# Patient Record
Sex: FEMALE | Race: WHITE | NOT HISPANIC OR LATINO | Employment: FULL TIME | RURAL
[De-identification: names, ages, dates, MRNs, and addresses within clinical notes are randomized per-mention and may not be internally consistent; named-entity substitution may affect disease eponyms.]

---

## 2017-01-26 ENCOUNTER — OFFICE VISIT (OUTPATIENT)
Dept: RETAIL CLINIC | Facility: CLINIC | Age: 67
End: 2017-01-26

## 2017-01-26 DIAGNOSIS — Z23 NEED FOR IMMUNIZATION AGAINST INFLUENZA: Primary | ICD-10-CM

## 2017-02-10 ENCOUNTER — TRANSCRIBE ORDERS (OUTPATIENT)
Dept: ADMINISTRATIVE | Facility: HOSPITAL | Age: 67
End: 2017-02-10

## 2017-02-10 DIAGNOSIS — N63.0 LUMP OR MASS IN BREAST: Primary | ICD-10-CM

## 2017-02-21 ENCOUNTER — APPOINTMENT (OUTPATIENT)
Dept: MAMMOGRAPHY | Facility: HOSPITAL | Age: 67
End: 2017-02-21

## 2017-02-24 ENCOUNTER — APPOINTMENT (OUTPATIENT)
Dept: MAMMOGRAPHY | Facility: HOSPITAL | Age: 67
End: 2017-02-24

## 2017-03-23 ENCOUNTER — APPOINTMENT (OUTPATIENT)
Dept: OTHER | Facility: HOSPITAL | Age: 67
End: 2017-03-23

## 2017-03-23 ENCOUNTER — APPOINTMENT (OUTPATIENT)
Dept: MAMMOGRAPHY | Facility: HOSPITAL | Age: 67
End: 2017-03-23

## 2017-03-23 ENCOUNTER — HOSPITAL ENCOUNTER (OUTPATIENT)
Dept: MAMMOGRAPHY | Facility: HOSPITAL | Age: 67
Discharge: HOME | End: 2017-03-23
Admitting: NURSE PRACTITIONER

## 2017-03-23 DIAGNOSIS — Z92.89 H/O MAMMOGRAM: ICD-10-CM

## 2017-03-23 DIAGNOSIS — N63.0 LUMP OR MASS IN BREAST: ICD-10-CM

## 2017-03-23 PROCEDURE — G0279 TOMOSYNTHESIS, MAMMO: HCPCS

## 2017-03-23 PROCEDURE — G0204 DX MAMMO INCL CAD BI: HCPCS | Performed by: RADIOLOGY

## 2017-03-23 PROCEDURE — G0204 DX MAMMO INCL CAD BI: HCPCS

## 2017-03-23 PROCEDURE — G0279 TOMOSYNTHESIS, MAMMO: HCPCS | Performed by: RADIOLOGY

## 2017-06-05 ENCOUNTER — TRANSCRIBE ORDERS (OUTPATIENT)
Dept: ADMINISTRATIVE | Facility: HOSPITAL | Age: 67
End: 2017-06-05

## 2017-06-05 DIAGNOSIS — R92.8 ABNORMAL MAMMOGRAM: Primary | ICD-10-CM

## 2017-06-15 ENCOUNTER — APPOINTMENT (OUTPATIENT)
Dept: PHYSICAL THERAPY | Facility: HOSPITAL | Age: 67
End: 2017-06-15

## 2017-06-26 ENCOUNTER — APPOINTMENT (OUTPATIENT)
Dept: MAMMOGRAPHY | Facility: HOSPITAL | Age: 67
End: 2017-06-26

## 2017-07-11 ENCOUNTER — OFFICE VISIT (OUTPATIENT)
Dept: RETAIL CLINIC | Facility: CLINIC | Age: 67
End: 2017-07-11

## 2017-07-11 VITALS
RESPIRATION RATE: 15 BRPM | HEART RATE: 75 BPM | TEMPERATURE: 97.5 F | WEIGHT: 243.6 LBS | HEIGHT: 69 IN | OXYGEN SATURATION: 96 % | BODY MASS INDEX: 36.08 KG/M2

## 2017-07-11 DIAGNOSIS — J06.9 UPPER RESPIRATORY TRACT INFECTION, UNSPECIFIED TYPE: Primary | ICD-10-CM

## 2017-07-11 PROCEDURE — 99213 OFFICE O/P EST LOW 20 MIN: CPT | Performed by: NURSE PRACTITIONER

## 2017-07-11 RX ORDER — LORATADINE 10 MG/1
10 TABLET ORAL DAILY
Qty: 30 TABLET | Refills: 0 | Status: SHIPPED | OUTPATIENT
Start: 2017-07-11 | End: 2017-08-10

## 2017-07-11 RX ORDER — PREDNISONE 10 MG/1
TABLET ORAL DAILY
Qty: 21 EACH | Refills: 0 | Status: SHIPPED | OUTPATIENT
Start: 2017-07-11 | End: 2017-07-17

## 2017-07-11 RX ORDER — SULFAMETHOXAZOLE AND TRIMETHOPRIM 800; 160 MG/1; MG/1
1 TABLET ORAL 2 TIMES DAILY
Qty: 20 TABLET | Refills: 0 | Status: SHIPPED | OUTPATIENT
Start: 2017-07-11 | End: 2017-07-21

## 2017-07-11 RX ORDER — ESTRADIOL 10 UG/1
1 INSERT VAGINAL 2 TIMES WEEKLY
COMMUNITY
End: 2021-01-05 | Stop reason: SDUPTHER

## 2017-07-11 NOTE — PROGRESS NOTES
Subjective   Keisha Malagon is a 66 y.o. female presents with c/o bilateral ear pain, intermittent headache, sinus pressure/congestion, and cough that started 3 weeks ago. Went to [facility] and was treated for ear infection and sinus infection with Augmentin x10 days; states she felt better until 3 days ago when symptoms recurred.  Dose of Tylenol earlier this morning.    Sinus Problem   This is a new problem. The current episode started 1 to 4 weeks ago. The problem has been gradually worsening since onset. The maximum temperature recorded prior to her arrival was 100.4 - 100.9 F. The fever has been present for 1 to 2 days. Her pain is at a severity of 4/10. The pain is mild. Associated symptoms include chills, congestion, coughing, ear pain, headaches (intermittent), sinus pressure and a sore throat. Pertinent negatives include no shortness of breath or sneezing.        The following portions of the patient's history were reviewed and updated as appropriate: allergies, current medications, past family history, past medical history, past social history, past surgical history and problem list.    Review of Systems   Constitutional: Positive for appetite change (decreased), chills, fatigue and fever.   HENT: Positive for congestion, ear pain, postnasal drip, sinus pressure and sore throat. Negative for rhinorrhea, sneezing and trouble swallowing.    Eyes: Negative for redness.   Respiratory: Positive for cough. Negative for chest tightness, shortness of breath and wheezing.    Cardiovascular: Negative for palpitations.   Gastrointestinal: Negative for abdominal pain, diarrhea, nausea and vomiting.   Musculoskeletal: Negative for myalgias.   Skin: Negative for rash.   Neurological: Positive for headaches (intermittent). Negative for dizziness.       Objective   Physical Exam   Constitutional: She is oriented to person, place, and time. She appears well-developed and well-nourished.   HENT:   Head: Normocephalic and atraumatic.    Right Ear: Hearing, external ear and ear canal normal. Tympanic membrane is not erythematous. A middle ear effusion (mild) is present.   Left Ear: Hearing, external ear and ear canal normal. Tympanic membrane is not erythematous. A middle ear effusion (mild) is present.   Nose: Mucosal edema (moderate) present. No rhinorrhea. Right sinus exhibits no maxillary sinus tenderness and no frontal sinus tenderness. Left sinus exhibits no maxillary sinus tenderness and no frontal sinus tenderness.   Mouth/Throat: Mucous membranes are dry. No uvula swelling. Posterior oropharyngeal erythema (mild) present.   Tonsils absent     Eyes: Conjunctivae are normal. Pupils are equal, round, and reactive to light.   Neck: Normal range of motion.   Cardiovascular: Normal rate, regular rhythm and normal heart sounds.    No murmur heard.  Pulmonary/Chest: Effort normal and breath sounds normal. No respiratory distress. She has no wheezes.   Lymphadenopathy:     She has cervical adenopathy (shotty).   Neurological: She is alert and oriented to person, place, and time.   Skin: Skin is warm and dry. No rash noted.   Psychiatric: She has a normal mood and affect. Her behavior is normal. Judgment and thought content normal.   Vitals reviewed.      Assessment/Plan   Keisha was seen today for sinusitis and earache.    Diagnoses and all orders for this visit:    Upper respiratory tract infection, unspecified type  -     loratadine (CLARITIN) 10 MG tablet; Take 1 tablet by mouth Daily for 30 days.  -     sulfamethoxazole-trimethoprim (BACTRIM DS) 800-160 MG per tablet; Take 1 tablet by mouth 2 (Two) Times a Day for 10 days.  -     PredniSONE (DELTASONE) 10 MG (21) tablet pack; Take  by mouth Daily for 6 days.    Advised patient that it is not likely that she has had 2 sinus infections in less than 3 weeks; symptoms are likely viral in nature. Patient pleasant but persistent; antibiotic has been prescribed for patient to  is symptoms do  not improve-patient verbalizes understanding. Instructed patient regarding diagnosis, proper medication administration including side effects, adequate fluid intake and techniques to prevent the spread of illness. If symptoms persist or worsen follow up with PCP for further evaluation. Patient verbalizes understanding.-CIARA Soliman

## 2017-07-11 NOTE — PATIENT INSTRUCTIONS
"Upper Respiratory Infection, Adult  Most upper respiratory infections (URIs) are caused by a virus. A URI affects the nose, throat, and upper air passages. The most common type of URI is often called \"the common cold.\"  HOME CARE   · Take medicines only as told by your doctor.  · Gargle warm saltwater or take cough drops to comfort your throat as told by your doctor.  · Use a warm mist humidifier or inhale steam from a shower to increase air moisture. This may make it easier to breathe.  · Drink enough fluid to keep your pee (urine) clear or pale yellow.  · Eat soups and other clear broths.  · Have a healthy diet.  · Rest as needed.  · Go back to work when your fever is gone or your doctor says it is okay.  ¨ You may need to stay home longer to avoid giving your URI to others.  ¨ You can also wear a face mask and wash your hands often to prevent spread of the virus.  · Use your inhaler more if you have asthma.  · Do not use any tobacco products, including cigarettes, chewing tobacco, or electronic cigarettes. If you need help quitting, ask your doctor.  GET HELP IF:  · You are getting worse, not better.  · Your symptoms are not helped by medicine.  · You have chills.  · You are getting more short of breath.  · You have brown or red mucus.  · You have yellow or brown discharge from your nose.  · You have pain in your face, especially when you bend forward.  · You have a fever.  · You have puffy (swollen) neck glands.  · You have pain while swallowing.  · You have white areas in the back of your throat.  GET HELP RIGHT AWAY IF:   · You have very bad or constant:    Headache.    Ear pain.    Pain in your forehead, behind your eyes, and over your cheekbones (sinus pain).    Chest pain.  · You have long-lasting (chronic) lung disease and any of the following:    Wheezing.    Long-lasting cough.    Coughing up blood.    A change in your usual mucus.  · You have a stiff neck.  · You have changes in your:    Vision.   "  Hearing.    Thinking.    Mood.  MAKE SURE YOU:   · Understand these instructions.  · Will watch your condition.  · Will get help right away if you are not doing well or get worse.     This information is not intended to replace advice given to you by your health care provider. Make sure you discuss any questions you have with your health care provider.     Document Released: 06/05/2009 Document Revised: 05/03/2016 Document Reviewed: 03/25/2015  [employer] Interactive Patient Education ©2017 [employer]

## 2017-07-13 ENCOUNTER — HOSPITAL ENCOUNTER (OUTPATIENT)
Dept: MAMMOGRAPHY | Facility: HOSPITAL | Age: 67
Discharge: HOME | End: 2017-07-13
Admitting: NURSE PRACTITIONER

## 2017-07-13 DIAGNOSIS — R92.8 ABNORMAL MAMMOGRAM: ICD-10-CM

## 2017-07-13 PROCEDURE — G0206 DX MAMMO INCL CAD UNI: HCPCS

## 2017-07-13 PROCEDURE — G0206 DX MAMMO INCL CAD UNI: HCPCS | Performed by: RADIOLOGY

## 2018-02-06 ENCOUNTER — TRANSCRIBE ORDERS (OUTPATIENT)
Dept: NUTRITION | Facility: HOSPITAL | Age: 68
End: 2018-02-06

## 2018-02-06 DIAGNOSIS — E78.00 HYPERCHOLESTEREMIA: Primary | ICD-10-CM

## 2018-02-06 DIAGNOSIS — E78.1 HYPERTRIGLYCERIDEMIA: ICD-10-CM

## 2018-02-16 ENCOUNTER — APPOINTMENT (OUTPATIENT)
Dept: NUTRITION | Facility: HOSPITAL | Age: 68
End: 2018-02-16
Attending: INTERNAL MEDICINE

## 2018-02-23 ENCOUNTER — HOSPITAL ENCOUNTER (OUTPATIENT)
Dept: NUTRITION | Facility: HOSPITAL | Age: 68
Setting detail: RECURRING SERIES
Discharge: HOME | End: 2018-02-23
Attending: INTERNAL MEDICINE

## 2018-02-23 VITALS — WEIGHT: 248 LBS | HEIGHT: 69 IN | BODY MASS INDEX: 36.73 KG/M2

## 2018-02-23 PROCEDURE — 97802 MEDICAL NUTRITION INDIV IN: CPT | Performed by: DIETITIAN, REGISTERED

## 2018-03-23 ENCOUNTER — APPOINTMENT (OUTPATIENT)
Dept: NUTRITION | Facility: HOSPITAL | Age: 68
End: 2018-03-23
Attending: INTERNAL MEDICINE

## 2018-05-14 ENCOUNTER — DOCUMENTATION (OUTPATIENT)
Dept: NUTRITION | Facility: HOSPITAL | Age: 68
End: 2018-05-14

## 2018-05-14 NOTE — PROGRESS NOTES
"Adult Outpatient Nutrition  Assessment    Patient Name:  Keisha Malagon  YOB: 1950  MRN: [id number]    Assessment Date:  5/14/2018    Comments:  Patient mailed nutrition follow up letter back to dietitian. Patient was see 2/23/18 for initial nutrition counseling visit. She cancelled follow up visits scheduled for 3/23/18 and 4/13/18. Per letter, patient did not have any problems following the suggestions made during appointment. She is not counting calories, \"just with portion control\". Patient stated no changes in weight. She has switched to soy milk and eating more plant-based protein. Patient reported \"still eating too much\". Her work schedule is busy, unable to come in. Patient declined phone call from dietitian, will reach out when she has questions.       Electronically signed by:  Kristan Lorenzana RD  05/14/18 10:32 AM  "

## 2018-05-30 DIAGNOSIS — Z00.00 ANNUAL PHYSICAL EXAM: Primary | ICD-10-CM

## 2018-05-30 DIAGNOSIS — E55.9 VITAMIN D DEFICIENCY: ICD-10-CM

## 2018-08-10 ENCOUNTER — OFFICE VISIT (OUTPATIENT)
Dept: FAMILY MEDICINE CLINIC | Facility: CLINIC | Age: 68
End: 2018-08-10

## 2018-08-10 VITALS
HEIGHT: 69 IN | SYSTOLIC BLOOD PRESSURE: 124 MMHG | OXYGEN SATURATION: 97 % | WEIGHT: 248 LBS | DIASTOLIC BLOOD PRESSURE: 84 MMHG | BODY MASS INDEX: 36.73 KG/M2 | HEART RATE: 75 BPM

## 2018-08-10 DIAGNOSIS — Z76.89 ENCOUNTER TO ESTABLISH CARE: Primary | ICD-10-CM

## 2018-08-10 DIAGNOSIS — R10.9 RIGHT SIDED ABDOMINAL PAIN: ICD-10-CM

## 2018-08-10 PROBLEM — M54.50 CHRONIC MIDLINE LOW BACK PAIN WITHOUT SCIATICA: Status: ACTIVE | Noted: 2018-08-10

## 2018-08-10 PROBLEM — G89.29 CHRONIC MIDLINE LOW BACK PAIN WITHOUT SCIATICA: Status: ACTIVE | Noted: 2018-08-10

## 2018-08-10 PROCEDURE — 99213 OFFICE O/P EST LOW 20 MIN: CPT | Performed by: PHYSICIAN ASSISTANT

## 2018-08-10 RX ORDER — AMITRIPTYLINE HYDROCHLORIDE 25 MG/1
25 TABLET, FILM COATED ORAL NIGHTLY
Qty: 30 TABLET | Refills: 2 | Status: SHIPPED | OUTPATIENT
Start: 2018-08-10 | End: 2020-02-03

## 2018-08-10 RX ORDER — OXYCODONE HYDROCHLORIDE AND ACETAMINOPHEN 5; 325 MG/1; MG/1
TABLET ORAL
COMMUNITY
Start: 2018-05-20 | End: 2018-08-10

## 2018-08-10 RX ORDER — OXYCODONE HYDROCHLORIDE AND ACETAMINOPHEN 5; 325 MG/1; MG/1
1 TABLET ORAL EVERY 6 HOURS PRN
COMMUNITY
End: 2021-01-08

## 2018-08-10 RX ORDER — FLUTICASONE PROPIONATE 50 MCG
SPRAY, SUSPENSION (ML) NASAL
COMMUNITY
Start: 2018-08-08 | End: 2020-02-21 | Stop reason: SDUPTHER

## 2018-08-10 RX ORDER — OMEPRAZOLE 20 MG/1
3 CAPSULE, DELAYED RELEASE ORAL DAILY
COMMUNITY
Start: 2018-07-24 | End: 2018-08-10

## 2018-08-10 RX ORDER — DEXLANSOPRAZOLE 60 MG/1
1 CAPSULE, DELAYED RELEASE ORAL DAILY
COMMUNITY
Start: 2018-08-06

## 2018-08-10 RX ORDER — BACLOFEN 10 MG/1
1 TABLET ORAL DAILY
COMMUNITY
Start: 2018-05-20 | End: 2018-10-22 | Stop reason: SDUPTHER

## 2018-08-10 NOTE — PROGRESS NOTES
Chief Complaint   Patient presents with   • Establish Care     stomach issues        HPI     Keisha Malagon is a 67 y.o. female who presents to establish care and for stomach issues.  Patient was previously seen by Dr. Hammer and myself at [facility].  Her past medical history is significant for low back pain and GERD.      Chief Complaint   Patient presents with   • Establish Care     stomach issues        Past Medical History:   Diagnosis Date   • Allergic    • Breast injury 11/2016    pt feel and injured left breast, bruise has cleared, but still feels a lump in that area   • GERD (gastroesophageal reflux disease)        Past Surgical History:   Procedure Laterality Date   • COLONOSCOPY     • HYSTERECTOMY     • OOPHORECTOMY     • TONSILLECTOMY         Family History   Problem Relation Age of Onset   • Ovarian cancer Maternal Grandmother 67   • No Known Problems Son    • No Known Problems Daughter    • Breast cancer Neg Hx        Social History     Social History   • Marital status: Single     Spouse name: N/A   • Number of children: N/A   • Years of education: N/A     Occupational History   • Not on file.     Social History Main Topics   • Smoking status: Never Smoker   • Smokeless tobacco: Never Used   • Alcohol use No   • Drug use: No   • Sexual activity: Defer     Other Topics Concern   • Not on file     Social History Narrative   • No narrative on file       Allergies   Allergen Reactions   • Bactrim [Sulfamethoxazole-Trimethoprim] Hives   • Levaquin [Levofloxacin In D5w] Other (See Comments)     Muscle weakness and soreness        ROS    Review of Systems   Constitutional: Negative for activity change, appetite change, chills, diaphoresis, fatigue, fever, unexpected weight gain and unexpected weight loss.   HENT: Negative for congestion, dental problem, ear pain, hearing loss, nosebleeds, sinus pressure, sore throat and trouble swallowing.    Eyes: Negative for blurred vision, pain, redness and visual disturbance.  "  Respiratory: Negative for apnea, cough, chest tightness, shortness of breath and wheezing.    Cardiovascular: Negative for chest pain, palpitations and leg swelling.   Gastrointestinal: Positive for abdominal pain. Negative for abdominal distention, anal bleeding, blood in stool, constipation, diarrhea, nausea, vomiting, GERD and indigestion.   Endocrine: Negative for cold intolerance, heat intolerance, polydipsia, polyphagia and polyuria.   Genitourinary: Negative for urinary incontinence, decreased urine volume, difficulty urinating, dysuria, frequency, hematuria and urgency.   Musculoskeletal: Negative for gait problem, joint swelling and bursitis.   Skin: Negative for dry skin, rash, skin lesions and bruise.   Neurological: Negative for dizziness, tremors, seizures, syncope, speech difficulty, weakness, light-headedness, headache, memory problem and confusion.   Hematological: Does not bruise/bleed easily.   Psychiatric/Behavioral: Negative for agitation, behavioral problems, decreased concentration, hallucinations, sleep disturbance, suicidal ideas, depressed mood and stress. The patient is not nervous/anxious.        Vitals:    08/10/18 1022   BP: 124/84   BP Location: Right arm   Patient Position: Sitting   Cuff Size: Adult   Pulse: 75   SpO2: 97%   Weight: 112 kg (248 lb)   Height: 175.3 cm (69.02\")         Current Outpatient Prescriptions:   •  baclofen (LIORESAL) 10 MG tablet, Take 1 tablet by mouth Daily., Disp: , Rfl:   •  DEXILANT 60 MG capsule, Take 1 capsule by mouth Daily., Disp: , Rfl:   •  estradiol (VAGIFEM) 10 MCG tablet vaginal tablet, Insert 1 tablet into the vagina 2 (Two) Times a Week., Disp: , Rfl:   •  fluticasone (FLONASE) 50 MCG/ACT nasal spray, , Disp: , Rfl:   •  oxyCODONE-acetaminophen (PERCOCET) 5-325 MG per tablet, Take 1 tablet by mouth Every 6 (Six) Hours As Needed., Disp: , Rfl:   •  amitriptyline (ELAVIL) 25 MG tablet, Take 1 tablet by mouth Every Night., Disp: 30 tablet, Rfl: " 2  •  Fluticasone Propionate (FLONASE NA), into each nostril., Disp: , Rfl:     PE    Physical Exam   Constitutional: She is oriented to person, place, and time. She appears well-developed and well-nourished. No distress.   HENT:   Head: Normocephalic.   Eyes: Conjunctivae are normal.   Neck: Normal range of motion.   Abdominal: Soft. Normal appearance. There is no tenderness. There is no rigidity, no rebound and no guarding. No hernia.   TTP along right lower aspect of abdomen only when patient is flexing stomach muscles.  No TTP with relaxed abdominal wall.   Musculoskeletal: Normal range of motion.   Neurological: She is alert and oriented to person, place, and time.   Skin: Skin is warm. No rash noted. She is not diaphoretic. No erythema.   Psychiatric: She has a normal mood and affect. Her speech is normal and behavior is normal. Judgment and thought content normal. She is not actively hallucinating. Cognition and memory are normal. She is attentive.   Vitals reviewed.      A/P    Keisha was seen today for establish care.    Diagnoses and all orders for this visit:    Encounter to establish care    Right sided abdominal pain  -pain is present with certain movements and when her abdominal wall is flexed  -normal appearance, no pain with deep palpation  -history and exam indicate musculoskeletal pain  -will trial elavil to help with abdominal pain and sleep, wakes up with pain occasionally  -recent colonoscopy, endoscopy, CT abdo/pelvis all unremarkable  -recommend avoiding heavy lifting, and working on core strengthening exercises like yoga, overall weight loss  -     amitriptyline (ELAVIL) 25 MG tablet; Take 1 tablet by mouth Every Night.         Plan of care reviewed with patient at the conclusion of today's visit. Education was provided regarding diagnosis, management and any prescribed or recommended OTC medications.  Patient verbalizes understanding of and agreement with management plan.    No Follow-up on  file.     Karena Garcia PA-C

## 2018-08-17 ENCOUNTER — PRIOR AUTHORIZATION (OUTPATIENT)
Dept: FAMILY MEDICINE CLINIC | Facility: CLINIC | Age: 68
End: 2018-08-17

## 2018-08-22 ENCOUNTER — TELEPHONE (OUTPATIENT)
Dept: FAMILY MEDICINE CLINIC | Facility: CLINIC | Age: 68
End: 2018-08-22

## 2018-08-22 NOTE — TELEPHONE ENCOUNTER
LVM for pt to call back. Pt insurance denied Amitriptyline but pt can get it at [facility] with a [employer] coupon for $10.

## 2018-09-14 ENCOUNTER — OFFICE VISIT (OUTPATIENT)
Dept: FAMILY MEDICINE CLINIC | Facility: CLINIC | Age: 68
End: 2018-09-14

## 2018-09-14 VITALS
SYSTOLIC BLOOD PRESSURE: 106 MMHG | HEIGHT: 69 IN | OXYGEN SATURATION: 96 % | HEART RATE: 79 BPM | BODY MASS INDEX: 35.99 KG/M2 | TEMPERATURE: 97.5 F | RESPIRATION RATE: 16 BRPM | DIASTOLIC BLOOD PRESSURE: 78 MMHG | WEIGHT: 243 LBS

## 2018-09-14 DIAGNOSIS — Z23 NEED FOR IMMUNIZATION AGAINST INFLUENZA: ICD-10-CM

## 2018-09-14 DIAGNOSIS — K21.9 GASTROESOPHAGEAL REFLUX DISEASE WITHOUT ESOPHAGITIS: ICD-10-CM

## 2018-09-14 DIAGNOSIS — E78.5 HYPERLIPIDEMIA, UNSPECIFIED HYPERLIPIDEMIA TYPE: ICD-10-CM

## 2018-09-14 DIAGNOSIS — R10.11 RUQ PAIN: Primary | ICD-10-CM

## 2018-09-14 DIAGNOSIS — B37.0 THRUSH: ICD-10-CM

## 2018-09-14 PROCEDURE — G0008 ADMIN INFLUENZA VIRUS VAC: HCPCS | Performed by: PHYSICIAN ASSISTANT

## 2018-09-14 PROCEDURE — 99214 OFFICE O/P EST MOD 30 MIN: CPT | Performed by: PHYSICIAN ASSISTANT

## 2018-09-14 PROCEDURE — 90662 IIV NO PRSV INCREASED AG IM: CPT | Performed by: PHYSICIAN ASSISTANT

## 2018-09-14 RX ORDER — ICOSAPENT ETHYL 1000 MG/1
2 CAPSULE ORAL 2 TIMES DAILY WITH MEALS
Qty: 360 CAPSULE | Refills: 3 | Status: SHIPPED | OUTPATIENT
Start: 2018-09-14 | End: 2020-04-01 | Stop reason: SDUPTHER

## 2018-09-14 RX ORDER — DIPHENHYDRAMINE, LIDOCAINE, NYSTATIN
5 KIT ORAL 3 TIMES DAILY
Qty: 60 ML | Refills: 0 | Status: SHIPPED | OUTPATIENT
Start: 2018-09-14 | End: 2018-09-14 | Stop reason: SDUPTHER

## 2018-09-14 RX ORDER — DIPHENHYDRAMINE, LIDOCAINE, NYSTATIN
5 KIT ORAL 3 TIMES DAILY
Qty: 60 ML | Refills: 0 | Status: SHIPPED | OUTPATIENT
Start: 2018-09-14 | End: 2020-02-03

## 2018-09-14 NOTE — PATIENT INSTRUCTIONS
-wild caught salmon  -limit tuna to 2 servings a week due to mercury  -beans and nuts    -Need prevnar 23 (office is out currently)    -the radiologist report of CT abdo/pelvis (and any other scans) obtain copy from GI doctor and bring to our office

## 2018-09-14 NOTE — PROGRESS NOTES
Chief Complaint   Patient presents with   • Abdominal Pain   • Results   • Immunizations     Hep A and High dose Flu        HPI     Keisha Malagon is a pleasant 67 y.o. female who is here for routine follow-up from previous visit on 08/10/18 for RUQ pain.  She has since been to Dr. King and is pending imaging for possible gall bladder disease.  She is watching her diet carefully and taking dexilant.  She has not started amitriptyline and wants to wait.  She reports burning along her tongue and throat with history of thrush.  She would like flu shot and prevnar today.    Past Medical History:   Diagnosis Date   • Allergic    • Breast injury 11/2016    pt feel and injured left breast, bruise has cleared, but still feels a lump in that area   • DDD (degenerative disc disease), lumbar    • GERD (gastroesophageal reflux disease)    • Irritable bowel syndrome    • Kidney stone        Past Surgical History:   Procedure Laterality Date   • COLONOSCOPY     • HYSTERECTOMY     • OOPHORECTOMY     • TONSILLECTOMY         Family History   Problem Relation Age of Onset   • Ovarian cancer Maternal Grandmother 67   • No Known Problems Son    • No Known Problems Daughter    • Breast cancer Neg Hx        Social History     Social History   • Marital status: Single     Spouse name: N/A   • Number of children: N/A   • Years of education: N/A     Occupational History   • Not on file.     Social History Main Topics   • Smoking status: Never Smoker   • Smokeless tobacco: Never Used   • Alcohol use No   • Drug use: No   • Sexual activity: Defer     Other Topics Concern   • Not on file     Social History Narrative   • No narrative on file       Allergies   Allergen Reactions   • Bactrim [Sulfamethoxazole-Trimethoprim] Hives   • Levaquin [Levofloxacin In D5w] Other (See Comments)     Muscle weakness and soreness        ROS    Review of Systems   Constitutional: Negative for chills, fatigue and fever.   HENT: Positive for sore throat.   "  Gastrointestinal: Positive for abdominal pain and indigestion. Negative for blood in stool, constipation, diarrhea, nausea and vomiting.   Musculoskeletal: Positive for back pain.   Psychiatric/Behavioral: Positive for stress. Negative for depressed mood. The patient is nervous/anxious.        Vitals:    09/14/18 1604   BP: 106/78   Pulse: 79   Resp: 16   Temp: 97.5 °F (36.4 °C)   TempSrc: Temporal Artery    SpO2: 96%   Weight: 110 kg (243 lb)   Height: 175.3 cm (69\")         Current Outpatient Prescriptions:   •  baclofen (LIORESAL) 10 MG tablet, Take 1 tablet by mouth Daily., Disp: , Rfl:   •  DEXILANT 60 MG capsule, Take 1 capsule by mouth Daily., Disp: , Rfl:   •  estradiol (VAGIFEM) 10 MCG tablet vaginal tablet, Insert 1 tablet into the vagina 2 (Two) Times a Week., Disp: , Rfl:   •  fluticasone (FLONASE) 50 MCG/ACT nasal spray, , Disp: , Rfl:   •  Fluticasone Propionate (FLONASE NA), into each nostril., Disp: , Rfl:   •  oxyCODONE-acetaminophen (PERCOCET) 5-325 MG per tablet, Take 1 tablet by mouth Every 6 (Six) Hours As Needed., Disp: , Rfl:   •  Probiotic Product (ALIGN EXTRA STRENGTH PO), Take  by mouth., Disp: , Rfl:   •  amitriptyline (ELAVIL) 25 MG tablet, Take 1 tablet by mouth Every Night., Disp: 30 tablet, Rfl: 2  •  icosapent ethyl (VASCEPA) 1 g capsule capsule, Take 2 g by mouth 2 (Two) Times a Day With Meals., Disp: 360 capsule, Rfl: 3  •  nystatin susp + lidocaine viscous (MAGIC MOUTHWASH) oral suspension, Swish and swallow 5 mL 3 (Three) Times a Day., Disp: 60 mL, Rfl: 0  No current facility-administered medications for this visit.     PE    Physical Exam   Constitutional: She is oriented to person, place, and time. She appears well-developed and well-nourished. No distress.   HENT:   Head: Normocephalic and atraumatic.   Right Ear: Hearing, tympanic membrane, external ear and ear canal normal.   Left Ear: Hearing, tympanic membrane, external ear and ear canal normal.   Mouth/Throat: Uvula is " midline and oropharynx is clear and moist.   Eyes: Conjunctivae are normal.   Neck: Normal range of motion.   Cardiovascular: Normal rate, regular rhythm and normal heart sounds.    Pulmonary/Chest: Effort normal and breath sounds normal.   Musculoskeletal: Normal range of motion. She exhibits no edema.   Neurological: She is alert and oriented to person, place, and time.   Skin: Skin is warm. No rash noted. She is not diaphoretic. No erythema.   Psychiatric: She has a normal mood and affect. Her behavior is normal. Judgment and thought content normal.   Vitals reviewed.      A/P    Keisha was seen today for abdominal pain, results and immunizations.    Diagnoses and all orders for this visit:    RUQ pain  -following with Dr. King  -pending imaging for possible gall bladder disease    Hyperlipidemia, unspecified hyperlipidemia type  -tolerating vascepa without any myalgias    Gastroesophageal reflux disease without esophagitis  -on dexilant    Need for immunization against influenza  -     Flu Vaccine High Dose PF 65YR+ ([id number])    Thrush  -treat with magic mouthwash       Plan of care reviewed with patient at the conclusion of today's visit. Education was provided regarding diagnosis, management and any prescribed or recommended OTC medications.  Patient verbalizes understanding of and agreement with management plan.    No Follow-up on file.     Karena Garcia PA-C

## 2018-09-19 ENCOUNTER — TELEPHONE (OUTPATIENT)
Dept: FAMILY MEDICINE CLINIC | Facility: CLINIC | Age: 68
End: 2018-09-19

## 2018-09-20 ENCOUNTER — TELEPHONE (OUTPATIENT)
Dept: FAMILY MEDICINE CLINIC | Facility: CLINIC | Age: 68
End: 2018-09-20

## 2018-09-20 NOTE — TELEPHONE ENCOUNTER
Spoke with pt about CT scan and she stated that she had talked to GI on Tuesday and they were supposed to have faxed the records here. I explained to the patient that we didn't have them in her chart and she stated that she would give them a call and get them to send them over.

## 2018-09-20 NOTE — TELEPHONE ENCOUNTER
I had spoke with the patient earlier about the medication and she stated that she didn't need the medication anymore. Stated that she had found a bottle from a different time that had been prescribed, also stated that symptoms were gone.     I called [facility] pharmacy and told them that the patient didn't want the medication and to cancel it on their end.

## 2018-09-20 NOTE — TELEPHONE ENCOUNTER
I do not have records from her GI doctor and I don't have the ability to look at the CT.  If patient brings in a copy of these when she gets the pneumonia vaccination I can look at them and call her or we can look at them together at an ov that day.

## 2018-09-20 NOTE — TELEPHONE ENCOUNTER
Compounded mouthwash scripts [facility] to [facility].     LM at #[phone] for pt to call back. Is she okay with the script going to one of these pharmacies?

## 2018-09-20 NOTE — TELEPHONE ENCOUNTER
Called pt about prescription and she stated that she wasn't worried about it. States that she found a bottle that she had from before that was unopened, also stated that her symptoms were gone. Pt also stated that she would like to come in for the pneumo 23 vaccine, told her that we had some in stock now. Pt also stated that she would like to talk to Karena about a CT scan that she had done at her gastroenterologists office, states that they told her the results but she didn't know if she needed to follow up from that so she would like a call to discuss this.

## 2018-09-20 NOTE — TELEPHONE ENCOUNTER
[facility] CALLED AND SAID THAT THEY CAN'T COMPOUND NYSTATIN + LIDOCAINE AND WANTS TO KNOW IF PCP WANTS THEM TO SPLIT THEM UP OR USE SOMETHING ELSE. PLEASE CALL BACK

## 2018-09-21 ENCOUNTER — OFFICE VISIT (OUTPATIENT)
Dept: FAMILY MEDICINE CLINIC | Facility: CLINIC | Age: 68
End: 2018-09-21

## 2018-09-21 ENCOUNTER — LAB (OUTPATIENT)
Dept: LAB | Facility: HOSPITAL | Age: 68
End: 2018-09-21

## 2018-09-21 VITALS
TEMPERATURE: 97.5 F | HEIGHT: 69 IN | WEIGHT: 245 LBS | OXYGEN SATURATION: 96 % | HEART RATE: 85 BPM | BODY MASS INDEX: 36.29 KG/M2 | DIASTOLIC BLOOD PRESSURE: 82 MMHG | SYSTOLIC BLOOD PRESSURE: 118 MMHG

## 2018-09-21 DIAGNOSIS — K29.70 GASTRITIS, PRESENCE OF BLEEDING UNSPECIFIED, UNSPECIFIED CHRONICITY, UNSPECIFIED GASTRITIS TYPE: ICD-10-CM

## 2018-09-21 DIAGNOSIS — Z71.85 VACCINE COUNSELING: ICD-10-CM

## 2018-09-21 DIAGNOSIS — R91.1 PULMONARY NODULE, RIGHT: ICD-10-CM

## 2018-09-21 DIAGNOSIS — K20.90 ESOPHAGITIS: ICD-10-CM

## 2018-09-21 DIAGNOSIS — R91.1 PULMONARY NODULE, RIGHT: Primary | ICD-10-CM

## 2018-09-21 LAB
CREAT BLD-MCNC: 0.91 MG/DL (ref 0.6–1.3)
GFR SERPL CREATININE-BSD FRML MDRD: 61 ML/MIN/1.73

## 2018-09-21 PROCEDURE — 82565 ASSAY OF CREATININE: CPT | Performed by: PHYSICIAN ASSISTANT

## 2018-09-21 PROCEDURE — 99214 OFFICE O/P EST MOD 30 MIN: CPT | Performed by: PHYSICIAN ASSISTANT

## 2018-09-21 PROCEDURE — 36415 COLL VENOUS BLD VENIPUNCTURE: CPT

## 2018-09-21 NOTE — PROGRESS NOTES
"Chief Complaint   Patient presents with   • F/u pulmonary nodule       HPI     Keisha Malagon is a pleasant 67 y.o. female who is here for follow-up of \"chief complaint.\" Patient has had a recent GI evaluation for RUQ pain with Dr. King including labs, EGD, colonoscopy, and abdominal CT revealing mild gastritis, esophagitis, and a colon polyp. We reviewed these results. Now taking dexilant. Has plan for HIDA scan next week. An incidental 1.4 cm RLL nodule was partially seen on CT for which f/u chest CT was recommended. No history tobacco use or respiratory complaints. She has questions about pneumonia and hepatitis A vaccination which we discussed.     Past Medical History:   Diagnosis Date   • Allergic    • Breast injury 11/2016    pt feel and injured left breast, bruise has cleared, but still feels a lump in that area   • DDD (degenerative disc disease), lumbar    • GERD (gastroesophageal reflux disease)    • Irritable bowel syndrome    • Kidney stone        Past Surgical History:   Procedure Laterality Date   • COLONOSCOPY     • HYSTERECTOMY     • OOPHORECTOMY     • TONSILLECTOMY         Family History   Problem Relation Age of Onset   • Ovarian cancer Maternal Grandmother 67   • No Known Problems Son    • No Known Problems Daughter    • Breast cancer Neg Hx        Social History     Social History   • Marital status: Single     Spouse name: N/A   • Number of children: N/A   • Years of education: N/A     Occupational History   • Not on file.     Social History Main Topics   • Smoking status: Never Smoker   • Smokeless tobacco: Never Used   • Alcohol use No   • Drug use: No   • Sexual activity: Defer     Other Topics Concern   • Not on file     Social History Narrative   • No narrative on file       Allergies   Allergen Reactions   • Levaquin [Levofloxacin In D5w] Other (See Comments)     Muscle weakness and soreness    • Bactrim [Sulfamethoxazole-Trimethoprim] Hives       ROS    Review of Systems   Respiratory: " Negative for cough and shortness of breath.    Cardiovascular: Negative for chest pain.   Gastrointestinal: Positive for abdominal pain.       Vitals:    09/21/18 1417   BP: 118/82   Pulse: 85   Temp: 97.5 °F (36.4 °C)   SpO2: 96%         Current Outpatient Prescriptions:   •  amitriptyline (ELAVIL) 25 MG tablet, Take 1 tablet by mouth Every Night., Disp: 30 tablet, Rfl: 2  •  baclofen (LIORESAL) 10 MG tablet, Take 1 tablet by mouth Daily., Disp: , Rfl:   •  DEXILANT 60 MG capsule, Take 1 capsule by mouth Daily., Disp: , Rfl:   •  estradiol (VAGIFEM) 10 MCG tablet vaginal tablet, Insert 1 tablet into the vagina 2 (Two) Times a Week., Disp: , Rfl:   •  fluticasone (FLONASE) 50 MCG/ACT nasal spray, , Disp: , Rfl:   •  Fluticasone Propionate (FLONASE NA), into each nostril., Disp: , Rfl:   •  icosapent ethyl (VASCEPA) 1 g capsule capsule, Take 2 g by mouth 2 (Two) Times a Day With Meals., Disp: 360 capsule, Rfl: 3  •  nystatin susp + lidocaine viscous (MAGIC MOUTHWASH) oral suspension, Swish and swallow 5 mL 3 (Three) Times a Day., Disp: 60 mL, Rfl: 0  •  oxyCODONE-acetaminophen (PERCOCET) 5-325 MG per tablet, Take 1 tablet by mouth Every 6 (Six) Hours As Needed., Disp: , Rfl:   •  Probiotic Product (ALIGN EXTRA STRENGTH PO), Take  by mouth., Disp: , Rfl:     PE    Physical Exam   Constitutional: She appears well-developed and well-nourished. No distress.   HENT:   Head: Normocephalic.   Cardiovascular: Normal rate, regular rhythm and normal heart sounds.    No murmur heard.  Pulmonary/Chest: Effort normal and breath sounds normal. She has no wheezes. She has no rales.   Neurological: She is alert.   Psychiatric: She has a normal mood and affect. Her behavior is normal.       Results    No results found for this or any previous visit.    A/P    Problem List Items Addressed This Visit     None      Visit Diagnoses     Pulmonary nodule, right    -  Primary  -1.4 cm RLL nodule. F/u dedicated chest CT recommended     Relevant Orders    CT Chest With Contrast    Creatinine, Serum    Gastritis, presence of bleeding unspecified, unspecified chronicity, unspecified gastritis type      -Escalation in PPI therapy. Following with Dr. King.       Esophagitis      -Continue present management      Vaccine counseling      -Current with pna vaccination  -Discussed average risk hep A and not required but she may receive if she desires. Declines at this time            Plan of care reviewed with patient at the conclusion of today's visit. Education was provided regarding diagnosis, management and any prescribed or recommended OTC medications.  Patient verbalizes understanding of and agreement with management plan.        DEBBIE Huff

## 2018-09-24 ENCOUNTER — TELEPHONE (OUTPATIENT)
Dept: FAMILY MEDICINE CLINIC | Facility: CLINIC | Age: 68
End: 2018-09-24

## 2018-10-05 ENCOUNTER — OFFICE VISIT (OUTPATIENT)
Dept: FAMILY MEDICINE CLINIC | Facility: CLINIC | Age: 68
End: 2018-10-05

## 2018-10-05 VITALS
HEIGHT: 69 IN | BODY MASS INDEX: 35.9 KG/M2 | OXYGEN SATURATION: 93 % | DIASTOLIC BLOOD PRESSURE: 78 MMHG | HEART RATE: 82 BPM | SYSTOLIC BLOOD PRESSURE: 112 MMHG | WEIGHT: 242.4 LBS

## 2018-10-05 DIAGNOSIS — R59.9 ENLARGED LYMPH NODES: ICD-10-CM

## 2018-10-05 DIAGNOSIS — G89.29 CHRONIC MIDLINE LOW BACK PAIN WITHOUT SCIATICA: ICD-10-CM

## 2018-10-05 DIAGNOSIS — Z23 NEED FOR HEPATITIS A IMMUNIZATION: ICD-10-CM

## 2018-10-05 DIAGNOSIS — K29.50 CHRONIC GASTRITIS WITHOUT BLEEDING, UNSPECIFIED GASTRITIS TYPE: ICD-10-CM

## 2018-10-05 DIAGNOSIS — M54.50 CHRONIC MIDLINE LOW BACK PAIN WITHOUT SCIATICA: ICD-10-CM

## 2018-10-05 DIAGNOSIS — R93.89 ABNORMAL FINDING ON RADIOLOGY EXAM: Primary | ICD-10-CM

## 2018-10-05 PROCEDURE — 99214 OFFICE O/P EST MOD 30 MIN: CPT | Performed by: PHYSICIAN ASSISTANT

## 2018-10-05 NOTE — PROGRESS NOTES
Chief Complaint   Patient presents with   • Follow-up       HPI      Keisha Malagon is a 67 y.o. female who presents to discuss recent imaging with HIDA scan and CT chest.  She has a few concerns regarding the wording on the radiology reports.  She continues to try and eat healthy and has lost a few pounds.  Her stomach issues have improved and she is doing better overall.  Discussed HIDA scan and CT chest in depth.  There were incidental findings of 2 enlarged lymph nodes on CT chest and we will repeat this in 6 months to ensure there are no changes.    Past Medical History:   Diagnosis Date   • Allergic    • Breast injury 11/2016    pt feel and injured left breast, bruise has cleared, but still feels a lump in that area   • DDD (degenerative disc disease), lumbar    • GERD (gastroesophageal reflux disease)    • Irritable bowel syndrome    • Kidney stone        Past Surgical History:   Procedure Laterality Date   • COLONOSCOPY     • HYSTERECTOMY     • OOPHORECTOMY     • TONSILLECTOMY         Family History   Problem Relation Age of Onset   • Ovarian cancer Maternal Grandmother 67   • No Known Problems Son    • No Known Problems Daughter    • Breast cancer Neg Hx        Social History     Social History   • Marital status: Single     Spouse name: N/A   • Number of children: N/A   • Years of education: N/A     Occupational History   • Not on file.     Social History Main Topics   • Smoking status: Never Smoker   • Smokeless tobacco: Never Used   • Alcohol use No   • Drug use: No   • Sexual activity: Defer     Other Topics Concern   • Not on file     Social History Narrative   • No narrative on file       Allergies   Allergen Reactions   • Levaquin [Levofloxacin In D5w] Other (See Comments)     Muscle weakness and soreness    • Bactrim [Sulfamethoxazole-Trimethoprim] Hives       ROS    Review of Systems   Constitutional: Negative for chills, diaphoresis, fatigue and fever.   Respiratory: Negative for cough, shortness of  "breath and wheezing.    Cardiovascular: Negative for chest pain, palpitations and leg swelling.   Gastrointestinal: Negative for abdominal pain, constipation, diarrhea, nausea, vomiting and indigestion.   Musculoskeletal: Positive for back pain.   Hematological: Negative for adenopathy.   Psychiatric/Behavioral: Positive for stress. Negative for agitation, sleep disturbance, suicidal ideas and depressed mood. The patient is nervous/anxious.        Vitals:    10/05/18 1048   BP: 112/78   BP Location: Right arm   Patient Position: Sitting   Cuff Size: Large Adult   Pulse: 82   SpO2: 93%   Weight: 110 kg (242 lb 6.4 oz)   Height: 175.3 cm (69\")         Current Outpatient Prescriptions:   •  amitriptyline (ELAVIL) 25 MG tablet, Take 1 tablet by mouth Every Night., Disp: 30 tablet, Rfl: 2  •  baclofen (LIORESAL) 10 MG tablet, Take 1 tablet by mouth Daily., Disp: , Rfl:   •  DEXILANT 60 MG capsule, Take 1 capsule by mouth Daily., Disp: , Rfl:   •  estradiol (VAGIFEM) 10 MCG tablet vaginal tablet, Insert 1 tablet into the vagina 2 (Two) Times a Week., Disp: , Rfl:   •  fluticasone (FLONASE) 50 MCG/ACT nasal spray, , Disp: , Rfl:   •  icosapent ethyl (VASCEPA) 1 g capsule capsule, Take 2 g by mouth 2 (Two) Times a Day With Meals., Disp: 360 capsule, Rfl: 3  •  oxyCODONE-acetaminophen (PERCOCET) 5-325 MG per tablet, Take 1 tablet by mouth Every 6 (Six) Hours As Needed., Disp: , Rfl:   •  Probiotic Product (ALIGN EXTRA STRENGTH PO), Take  by mouth., Disp: , Rfl:   •  hepatitis A (HAVRIX) 1440 EL U/ML vaccine, Inject 1 mL into the appropriate muscle as directed by prescriber 1 (One) Time for 1 dose., Disp: 1 mL, Rfl: 0  •  nystatin susp + lidocaine viscous (MAGIC MOUTHWASH) oral suspension, Swish and swallow 5 mL 3 (Three) Times a Day., Disp: 60 mL, Rfl: 0  No current facility-administered medications for this visit.     PE    Physical Exam   Constitutional: She is oriented to person, place, and time. She appears " well-developed and well-nourished. No distress.   HENT:   Head: Normocephalic.   Eyes: Conjunctivae are normal.   Neck: Normal range of motion. Neck supple.   Cardiovascular: Normal rate, regular rhythm and normal heart sounds.    Pulmonary/Chest: Effort normal and breath sounds normal.   Musculoskeletal: Normal range of motion.   Lymphadenopathy:     She has no cervical adenopathy.        Right cervical: No superficial cervical, no deep cervical and no posterior cervical adenopathy present.       Left cervical: No superficial cervical, no deep cervical and no posterior cervical adenopathy present.   Neurological: She is alert and oriented to person, place, and time.   Skin: Skin is warm. No rash noted. She is not diaphoretic. No erythema.   Psychiatric: She has a normal mood and affect. Her behavior is normal. Judgment and thought content normal.   Vitals reviewed.       A/P    Keisha was seen today for follow-up.    Diagnoses and all orders for this visit:    Abnormal finding on radiology exam  -reviewed recent HIDA scan and CT chest with contrast in depth with patient    Enlarged lymph nodes  -2 enlarged lymph nodes noted on CT chest with contrast  -no lung nodules appreciated  -will repeat CT chest with contrast in 6 months to ensure no growth    Chronic midline low back pain without sciatica  -ongoing chronic issue  -will given  out exercises  -managing with medication, brace, discussed proper lifting techniques, uses heating pad    Chronic gastritis without bleeding, unspecified gastritis type  -improved with certain diet food changes and start of dexilant    Need for hepatitis A immunization  -     hepatitis A (HAVRIX) 1440 EL U/ML vaccine; Inject 1 mL into the appropriate muscle as directed by prescriber 1 (One) Time for 1 dose.         Plan of care reviewed with patient at the conclusion of today's visit. Education was provided regarding diagnosis, management and any prescribed or recommended OTC  medications.  Patient verbalizes understanding of and agreement with management plan.    No Follow-up on file.     Karena Garcia PA-C

## 2018-10-22 ENCOUNTER — TELEPHONE (OUTPATIENT)
Dept: FAMILY MEDICINE CLINIC | Facility: CLINIC | Age: 68
End: 2018-10-22

## 2018-10-22 RX ORDER — BACLOFEN 10 MG/1
10 TABLET ORAL DAILY
Qty: 90 TABLET | Refills: 0 | Status: SHIPPED | OUTPATIENT
Start: 2018-10-22

## 2018-10-30 DIAGNOSIS — R91.1 PULMONARY NODULE, RIGHT: ICD-10-CM

## 2019-01-02 NOTE — TELEPHONE ENCOUNTER
ERROR   How Severe Is Your Skin Lesion?: mild Have Your Skin Lesions Been Treated?: not been treated Is This A New Presentation, Or A Follow-Up?: Skin Lesions

## 2019-04-26 ENCOUNTER — TRANSCRIBE ORDERS (OUTPATIENT)
Dept: ADMINISTRATIVE | Facility: HOSPITAL | Age: 69
End: 2019-04-26

## 2019-04-26 DIAGNOSIS — R92.8 ABNORMAL MAMMOGRAM: Primary | ICD-10-CM

## 2019-07-19 ENCOUNTER — APPOINTMENT (OUTPATIENT)
Dept: MAMMOGRAPHY | Facility: HOSPITAL | Age: 69
End: 2019-07-19

## 2019-07-24 ENCOUNTER — TELEPHONE (OUTPATIENT)
Dept: FAMILY MEDICINE CLINIC | Facility: CLINIC | Age: 69
End: 2019-07-24

## 2019-07-24 NOTE — TELEPHONE ENCOUNTER
----- Message from Karena Garcia PA-C sent at 7/24/2019  8:57 AM EDT -----  Regarding: appointment  Please call patient.  Needs follow-up appointment.  We need to repeat the scan of her chest to ensure that her lymph nodes are not changing size.  This was a previous incidental finding and was to be followed up in 6 months on.

## 2019-07-24 NOTE — TELEPHONE ENCOUNTER
LVM explaining Karena would like patient to call and schedule her appt for a follow up.   Office # given for patient to return our call and schedule.

## 2019-09-18 ENCOUNTER — APPOINTMENT (OUTPATIENT)
Dept: MAMMOGRAPHY | Facility: HOSPITAL | Age: 69
End: 2019-09-18

## 2020-02-03 ENCOUNTER — OFFICE VISIT (OUTPATIENT)
Dept: INTERNAL MEDICINE | Facility: CLINIC | Age: 70
End: 2020-02-03

## 2020-02-03 VITALS
BODY MASS INDEX: 34.42 KG/M2 | TEMPERATURE: 97.6 F | DIASTOLIC BLOOD PRESSURE: 82 MMHG | WEIGHT: 232.4 LBS | HEART RATE: 74 BPM | HEIGHT: 69 IN | OXYGEN SATURATION: 98 % | RESPIRATION RATE: 16 BRPM | SYSTOLIC BLOOD PRESSURE: 130 MMHG

## 2020-02-03 DIAGNOSIS — K29.50 CHRONIC GASTRITIS WITHOUT BLEEDING, UNSPECIFIED GASTRITIS TYPE: Primary | ICD-10-CM

## 2020-02-03 DIAGNOSIS — E78.5 HYPERLIPIDEMIA, UNSPECIFIED HYPERLIPIDEMIA TYPE: ICD-10-CM

## 2020-02-03 DIAGNOSIS — G89.29 CHRONIC MIDLINE LOW BACK PAIN WITHOUT SCIATICA: ICD-10-CM

## 2020-02-03 DIAGNOSIS — N32.81 OVERACTIVE BLADDER: ICD-10-CM

## 2020-02-03 DIAGNOSIS — M54.50 CHRONIC MIDLINE LOW BACK PAIN WITHOUT SCIATICA: ICD-10-CM

## 2020-02-03 DIAGNOSIS — G47.33 OSA (OBSTRUCTIVE SLEEP APNEA): ICD-10-CM

## 2020-02-03 DIAGNOSIS — Z12.31 ENCOUNTER FOR SCREENING MAMMOGRAM FOR MALIGNANT NEOPLASM OF BREAST: ICD-10-CM

## 2020-02-03 DIAGNOSIS — M15.9 PRIMARY OSTEOARTHRITIS INVOLVING MULTIPLE JOINTS: ICD-10-CM

## 2020-02-03 DIAGNOSIS — J30.89 NON-SEASONAL ALLERGIC RHINITIS, UNSPECIFIED TRIGGER: ICD-10-CM

## 2020-02-03 PROCEDURE — 99204 OFFICE O/P NEW MOD 45 MIN: CPT | Performed by: INTERNAL MEDICINE

## 2020-02-03 RX ORDER — TRAMADOL HYDROCHLORIDE 50 MG/1
50 TABLET ORAL DAILY PRN
COMMUNITY
Start: 2020-01-06

## 2020-02-03 RX ORDER — SENNOSIDES 8.6 MG
1300 CAPSULE ORAL 2 TIMES DAILY
COMMUNITY

## 2020-02-03 RX ORDER — AZELASTINE HYDROCHLORIDE, FLUTICASONE PROPIONATE 137; 50 UG/1; UG/1
SPRAY, METERED NASAL AS NEEDED
COMMUNITY
End: 2020-02-17 | Stop reason: SDUPTHER

## 2020-02-03 RX ORDER — NIACINAMIDE 500 MG
TABLET, EXTENDED RELEASE ORAL DAILY
COMMUNITY
End: 2021-02-05

## 2020-02-03 RX ORDER — MELATONIN
1000 DAILY
COMMUNITY
End: 2021-04-16

## 2020-02-03 RX ORDER — AZELASTINE 1 MG/ML
50 SPRAY, METERED NASAL AS NEEDED
COMMUNITY
Start: 2015-02-20 | End: 2020-02-03
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
## 2020-02-04 PROBLEM — E78.5 HYPERLIPIDEMIA: Status: ACTIVE | Noted: 2020-02-04

## 2020-02-04 PROBLEM — M15.0 PRIMARY OSTEOARTHRITIS INVOLVING MULTIPLE JOINTS: Status: ACTIVE | Noted: 2020-02-04

## 2020-02-04 PROBLEM — M15.9 PRIMARY OSTEOARTHRITIS INVOLVING MULTIPLE JOINTS: Status: ACTIVE | Noted: 2020-02-04

## 2020-02-04 PROBLEM — J30.89 NON-SEASONAL ALLERGIC RHINITIS: Status: ACTIVE | Noted: 2020-02-04

## 2020-02-04 PROBLEM — N32.81 OVERACTIVE BLADDER: Status: ACTIVE | Noted: 2020-02-04

## 2020-02-04 PROBLEM — G47.33 OSA (OBSTRUCTIVE SLEEP APNEA): Status: ACTIVE | Noted: 2020-02-04

## 2020-02-17 ENCOUNTER — TELEPHONE (OUTPATIENT)
Dept: INTERNAL MEDICINE | Facility: CLINIC | Age: 70
End: 2020-02-17

## 2020-02-17 DIAGNOSIS — J30.89 NON-SEASONAL ALLERGIC RHINITIS, UNSPECIFIED TRIGGER: Primary | ICD-10-CM

## 2020-02-17 RX ORDER — AZELASTINE HYDROCHLORIDE, FLUTICASONE PROPIONATE 137; 50 UG/1; UG/1
1 SPRAY, METERED NASAL 2 TIMES DAILY PRN
Qty: 1 BOTTLE | Refills: 5 | Status: SHIPPED | OUTPATIENT
Start: 2020-02-17 | End: 2021-03-30

## 2020-02-21 ENCOUNTER — TELEPHONE (OUTPATIENT)
Dept: INTERNAL MEDICINE | Facility: CLINIC | Age: 70
End: 2020-02-21

## 2020-02-21 DIAGNOSIS — J30.89 NON-SEASONAL ALLERGIC RHINITIS, UNSPECIFIED TRIGGER: Primary | ICD-10-CM

## 2020-02-21 RX ORDER — FLUTICASONE PROPIONATE 50 MCG
2 SPRAY, SUSPENSION (ML) NASAL DAILY
Qty: 1 BOTTLE | Refills: 5 | Status: SHIPPED | OUTPATIENT
Start: 2020-02-21 | End: 2020-04-01 | Stop reason: SDUPTHER

## 2020-02-21 NOTE — TELEPHONE ENCOUNTER
Patient called and stated that she is needing another script sent to [facility]. She got the one for Dymista and the patient stated she doesn't take it all the time she takes it as needed.     Patient call back [phone]    Pharmacy [facility]    Please advise

## 2020-02-21 NOTE — TELEPHONE ENCOUNTER
PT states that [facility] pharm had said that she needs a script that just has the Fluticasone x2 daily and no t with the Azelastine added to it. The pharm stated that it is an either or and not a combination. They sent her Dymista which has the Azelastine in it. Please advise

## 2020-02-21 NOTE — TELEPHONE ENCOUNTER
I have sent the script for flonase. The previous refill request was for dymista not flonase hence the reason why dymista was refilled.

## 2020-02-21 NOTE — TELEPHONE ENCOUNTER
The prescription was requested and a script was sent with directions to use as needed. Please clarify what it is that the patient is needing.

## 2020-02-27 DIAGNOSIS — J30.89 NON-SEASONAL ALLERGIC RHINITIS, UNSPECIFIED TRIGGER: ICD-10-CM

## 2020-02-28 RX ORDER — FLUTICASONE PROPIONATE 50 MCG
2 SPRAY, SUSPENSION (ML) NASAL DAILY
Qty: 1 BOTTLE | Refills: 5 | OUTPATIENT
Start: 2020-02-28

## 2020-03-09 ENCOUNTER — CONSULT (OUTPATIENT)
Dept: SLEEP MEDICINE | Facility: HOSPITAL | Age: 70
End: 2020-03-09

## 2020-03-09 VITALS
HEART RATE: 74 BPM | WEIGHT: 234.5 LBS | BODY MASS INDEX: 34.73 KG/M2 | DIASTOLIC BLOOD PRESSURE: 84 MMHG | SYSTOLIC BLOOD PRESSURE: 140 MMHG | HEIGHT: 69 IN | OXYGEN SATURATION: 96 %

## 2020-03-09 DIAGNOSIS — Z99.89 OSA ON CPAP: ICD-10-CM

## 2020-03-09 DIAGNOSIS — E66.9 OBESITY (BMI 30-39.9): ICD-10-CM

## 2020-03-09 DIAGNOSIS — G47.33 OSA ON CPAP: ICD-10-CM

## 2020-03-09 DIAGNOSIS — G47.33 OBSTRUCTIVE SLEEP APNEA, ADULT: Primary | ICD-10-CM

## 2020-03-09 PROCEDURE — 99203 OFFICE O/P NEW LOW 30 MIN: CPT | Performed by: INTERNAL MEDICINE

## 2020-03-20 ENCOUNTER — TELEPHONE (OUTPATIENT)
Dept: INTERNAL MEDICINE | Facility: CLINIC | Age: 70
End: 2020-03-20

## 2020-03-20 NOTE — TELEPHONE ENCOUNTER
PT states that she has ear ache in lft ear,scratchy throat. PT is stating that she usually take antibiotics and the symptoms go away. Please advise

## 2020-03-20 NOTE — TELEPHONE ENCOUNTER
I do not prescribe antibiotics over the phone. She will have to be evaluated in this office or an urgent care. Her symptoms may be related to allergies which are flaring this time of year. I would recommend that she try treating with her dymista and an oral antihistamine if she would prefer to try and avoid an office visit.

## 2020-03-20 NOTE — TELEPHONE ENCOUNTER
Pt is calling states she is has an  Ear infection symptoms are scratchy throat, ear pain, and knows she doesn't have COVID-19    She would like to have an antibiotic called in at [facility]    Please advise and give call [phone] (H)

## 2020-04-01 ENCOUNTER — TELEPHONE (OUTPATIENT)
Dept: INTERNAL MEDICINE | Facility: CLINIC | Age: 70
End: 2020-04-01

## 2020-04-01 DIAGNOSIS — E78.5 HYPERLIPIDEMIA, UNSPECIFIED HYPERLIPIDEMIA TYPE: Primary | ICD-10-CM

## 2020-04-01 DIAGNOSIS — J30.89 NON-SEASONAL ALLERGIC RHINITIS, UNSPECIFIED TRIGGER: ICD-10-CM

## 2020-04-01 RX ORDER — FLUTICASONE PROPIONATE 50 MCG
2 SPRAY, SUSPENSION (ML) NASAL DAILY
Qty: 3 BOTTLE | Refills: 2 | Status: SHIPPED | OUTPATIENT
Start: 2020-04-01 | End: 2021-05-19 | Stop reason: SDUPTHER

## 2020-04-01 RX ORDER — ICOSAPENT ETHYL 1000 MG/1
2 CAPSULE ORAL 2 TIMES DAILY WITH MEALS
Qty: 360 CAPSULE | Refills: 3 | Status: SHIPPED | OUTPATIENT
Start: 2020-04-01 | End: 2021-02-05

## 2020-04-01 NOTE — TELEPHONE ENCOUNTER
I see that the icosapent ethyl was prescribed by another provider. Do you want to write a script out for this? Please advise

## 2020-04-01 NOTE — TELEPHONE ENCOUNTER
PT CALLED TO REQUEST A 90 DAY FOR fluticasone (FLONASE) 50 MCG/ACT nasal spray AND icosapent ethyl (VASCEPA) 1 g capsule capsule.    PT CONTACT  [phone]   PT REQUEST A CALL BACK WHEN IT HAS BEEN TAKEN CARE OF.    PHARMACY VERIFIED [facility]

## 2020-04-17 ENCOUNTER — APPOINTMENT (OUTPATIENT)
Dept: MAMMOGRAPHY | Facility: HOSPITAL | Age: 70
End: 2020-04-17

## 2020-04-24 ENCOUNTER — APPOINTMENT (OUTPATIENT)
Dept: MAMMOGRAPHY | Facility: HOSPITAL | Age: 70
End: 2020-04-24

## 2020-07-15 ENCOUNTER — TRANSCRIBE ORDERS (OUTPATIENT)
Dept: ADMINISTRATIVE | Facility: HOSPITAL | Age: 70
End: 2020-07-15

## 2020-07-15 DIAGNOSIS — Z12.31 VISIT FOR SCREENING MAMMOGRAM: Primary | ICD-10-CM

## 2020-07-18 ENCOUNTER — HOSPITAL ENCOUNTER (OUTPATIENT)
Dept: MAMMOGRAPHY | Facility: HOSPITAL | Age: 70
Discharge: HOME | End: 2020-07-18
Admitting: INTERNAL MEDICINE

## 2020-07-18 DIAGNOSIS — Z12.31 VISIT FOR SCREENING MAMMOGRAM: ICD-10-CM

## 2020-07-18 PROCEDURE — 77063 BREAST TOMOSYNTHESIS BI: CPT

## 2020-07-18 PROCEDURE — 77063 BREAST TOMOSYNTHESIS BI: CPT | Performed by: RADIOLOGY

## 2020-07-18 PROCEDURE — 77067 SCR MAMMO BI INCL CAD: CPT | Performed by: RADIOLOGY

## 2020-07-18 PROCEDURE — 77067 SCR MAMMO BI INCL CAD: CPT

## 2020-07-23 ENCOUNTER — OFFICE VISIT (OUTPATIENT)
Dept: INTERNAL MEDICINE | Facility: CLINIC | Age: 70
End: 2020-07-23

## 2020-07-23 VITALS
SYSTOLIC BLOOD PRESSURE: 158 MMHG | WEIGHT: 235.6 LBS | DIASTOLIC BLOOD PRESSURE: 86 MMHG | OXYGEN SATURATION: 98 % | TEMPERATURE: 97.9 F | HEART RATE: 78 BPM | BODY MASS INDEX: 34.59 KG/M2

## 2020-07-23 DIAGNOSIS — R35.0 URINARY FREQUENCY: Primary | ICD-10-CM

## 2020-07-23 LAB
BILIRUB BLD-MCNC: NEGATIVE MG/DL
CLARITY, POC: CLEAR
COLOR UR: YELLOW
GLUCOSE UR STRIP-MCNC: NEGATIVE MG/DL
KETONES UR QL: NEGATIVE
LEUKOCYTE EST, POC: NEGATIVE
NITRITE UR-MCNC: NEGATIVE MG/ML
PH UR: 6 [PH] (ref 5–8)
PROT UR STRIP-MCNC: NEGATIVE MG/DL
RBC # UR STRIP: NEGATIVE /UL
SP GR UR: 1.01 (ref 1–1.03)
UROBILINOGEN UR QL: NORMAL

## 2020-07-23 PROCEDURE — 99213 OFFICE O/P EST LOW 20 MIN: CPT | Performed by: NURSE PRACTITIONER

## 2020-07-23 PROCEDURE — 81003 URINALYSIS AUTO W/O SCOPE: CPT | Performed by: NURSE PRACTITIONER

## 2020-07-23 NOTE — PROGRESS NOTES
Chief Complaint   Patient presents with   • Urinary Tract Infection       History of Present Illness    Keisha Malagon is a 69 y.o. female who presents today for urinary symptoms.    Urinary Tract Infection    This is a new problem. Episode onset: in the past 2 weeks. The problem occurs intermittently. The problem has been unchanged. The patient is experiencing no pain. There has been no fever. Associated symptoms include frequency and urgency. Pertinent negatives include no chills, discharge, flank pain, hematuria, hesitancy, nausea, possible pregnancy, sweats or vomiting. Associated symptoms comments: Urinary incontinence, facial flushing, myalgias. She has tried increased fluids for the symptoms. The treatment provided mild relief. Her past medical history is significant for a urological procedure. pelvic floor prolapse/rectocele/cystocele   Reports she is going to be seeing a new urologist as she is concerned about cystocele recurrence. Has urinary frequency at baseline but feels increased pressure as of late.      [facility]    The following portions of the patient's history were reviewed and updated as appropriate: allergies, current medications, past family history, past medical history, past social history, past surgical history and problem list.     Social History     Tobacco Use   • Smoking status: Former Smoker     Types: Cigarettes     Last attempt to quit: 1968     Years since quittin.5   • Smokeless tobacco: Never Used   • Tobacco comment: 4 cigarettes a week senior year of highschool   Substance Use Topics   • Alcohol use: No       Past Medical History:   Diagnosis Date   • Allergic    • Breast injury 2016    pt feel and injured left breast, bruise has cleared, but still feels a lump in that area   • DDD (degenerative disc disease), lumbar    • GERD (gastroesophageal reflux disease)    • Irritable bowel syndrome    • Kidney stone        Past Surgical History:   Procedure Laterality Date   •  COLONOSCOPY     • HEMORRHOIDECTOMY     • HYSTERECTOMY     • OOPHORECTOMY     • ORIF ANKLE FRACTURE     • ROTATOR CUFF REPAIR Bilateral    • TONSILLECTOMY     • UVULOPALATOPLASTY         Family History   Problem Relation Age of Onset   • Ovarian cancer Maternal Grandmother 67   • Arthritis Mother    • Osteoporosis Mother    • Sjogren's syndrome Mother    • Lung disease Mother         ILD due to sjogrens   • Stroke Father    • No Known Problems Son    • No Known Problems Daughter    • Breast cancer Neg Hx        Allergies   Allergen Reactions   • Levaquin [Levofloxacin In D5w] Other (See Comments)     Muscle weakness and soreness    • Bactrim [Sulfamethoxazole-Trimethoprim] Hives         Current Outpatient Medications:   •  acetaminophen (TYLENOL) 650 MG 8 hr tablet, Take 650 mg by mouth 2 (Two) Times a Day., Disp: , Rfl:   •  Azelastine-Fluticasone 137-50 MCG/ACT suspension, 1 spray into the nostril(s) as directed by provider 2 (Two) Times a Day As Needed (allergies)., Disp: 1 bottle, Rfl: 5  •  B Complex-Biotin-FA (SUPER B-COMPLEX) tablet, Take  by mouth., Disp: , Rfl:   •  baclofen (LIORESAL) 10 MG tablet, Take 1 tablet by mouth Daily. (Patient taking differently: Take 10 mg by mouth As Needed.), Disp: 90 tablet, Rfl: 0  •  cholecalciferol (VITAMIN D3) 25 MCG (1000 UT) tablet, Take 1,000 Units by mouth Daily., Disp: , Rfl:   •  Collagen 500 MG capsule, Take  by mouth Daily., Disp: , Rfl:   •  DEXILANT 60 MG capsule, Take 1 capsule by mouth Daily., Disp: , Rfl:   •  diclofenac (VOLTAREN) 1 % gel gel, diclofenac 1 % topical gel, Disp: , Rfl:   •  Digestive Enzymes capsule, Take  by mouth Daily., Disp: , Rfl:   •  estradiol (VAGIFEM) 10 MCG tablet vaginal tablet, Insert 1 tablet into the vagina 2 (Two) Times a Week., Disp: , Rfl:   •  fluticasone (FLONASE) 50 MCG/ACT nasal spray, 2 sprays into the nostril(s) as directed by provider Daily., Disp: 3 bottle, Rfl: 2  •  Garlic (GNP GARLIC EXTRACT) 400 MG tablet, Take  1,000 mg by mouth Daily., Disp: , Rfl:   •  Glucosamine-Chondroitin 250-200 MG tablet, Take  by mouth Daily., Disp: , Rfl:   •  icosapent ethyl (Vascepa) 1 g capsule capsule, Take 2 g by mouth 2 (Two) Times a Day With Meals., Disp: 360 capsule, Rfl: 3  •  oxyCODONE-acetaminophen (PERCOCET) 5-325 MG per tablet, Take 1 tablet by mouth Every 6 (Six) Hours As Needed., Disp: , Rfl:   •  Pediatric Multivit-Minerals-C (COMPLETE MULTI-VITAMIN PO), Take  by mouth., Disp: , Rfl:   •  Probiotic Product (ALIGN EXTRA STRENGTH PO), Take  by mouth., Disp: , Rfl:   •  traMADol (ULTRAM) 50 MG tablet, Take 50 mg by mouth As Needed., Disp: , Rfl:     Review of Systems  Review of Systems   Constitutional: Negative for appetite change, chills, diaphoresis, fatigue and fever.   Eyes: Negative for visual disturbance.   Respiratory: Negative for cough and shortness of breath.    Cardiovascular: Negative for chest pain and palpitations.   Gastrointestinal: Negative for abdominal pain, nausea and vomiting.   Genitourinary: Positive for frequency and urgency. Negative for decreased urine volume, difficulty urinating, dysuria, flank pain, hematuria, hesitancy and pelvic pain.   Psychiatric/Behavioral: Negative for sleep disturbance.       Vitals:  Vitals:    07/23/20 1132   BP: 158/86   Pulse: 78   Temp: 97.9 °F (36.6 °C)   SpO2: 98%   Weight: 107 kg (235 lb 9.6 oz)   PainSc: 0-No pain       Physical Exam  Physical Exam   Constitutional: She is oriented to person, place, and time. Vital signs are normal. She appears well-developed and well-nourished.  Non-toxic appearance. No distress.   HENT:   Head: Normocephalic and atraumatic.   Neck: Trachea normal and phonation normal. Carotid bruit is not present.   Cardiovascular: Normal rate, regular rhythm and normal heart sounds.   Pulmonary/Chest: Effort normal and breath sounds normal. No respiratory distress. She has no decreased breath sounds. She has no wheezes. She has no rhonchi. She has no  rales.   Abdominal: Normal appearance. She exhibits no shifting dullness, no distension, no fluid wave, no abdominal bruit and no mass. There is no hepatosplenomegaly. There is no tenderness. There is no rigidity, no guarding and no CVA tenderness.   Neurological: She is alert and oriented to person, place, and time.   Skin: Skin is warm, dry and intact. No rash noted.   Psychiatric: She has a normal mood and affect. Her behavior is normal.   Nursing note and vitals reviewed.      Labs  Results for orders placed or performed in visit on 07/23/20   POC Urinalysis Dipstick, Automated   Result Value Ref Range    Color Yellow Yellow, Straw, Dark Yellow, Igovana    Clarity, UA Clear Clear    Specific Gravity  1.010 1.005 - 1.030    pH, Urine 6.0 5.0 - 8.0    Leukocytes Negative Negative    Nitrite, UA Negative Negative    Protein, POC Negative Negative mg/dL    Glucose, UA Negative Negative, 1000 mg/dL (3+) mg/dL    Ketones, UA Negative Negative    Urobilinogen, UA Normal Normal    Bilirubin Negative Negative    Blood, UA Negative Negative       Assessment/Plan    Keisha was seen today for urinary tract infection.    Diagnoses and all orders for this visit:    Urinary frequency  -     POC Urinalysis Dipstick, Automated  -     Urine Culture - Urine, Urine, Clean Catch    Will notify of urine culture results received and treat accordingly. Patient advised in adequate water intake and avoidance of excessive caffeine.  Advised patient to follow-up for new, worsening, or persistent symptoms for repeat urinalysis.      Plan of care reviewed with patient at conclusion of today's visit. Patient education was provided regarding diagnosis, management, and prescribed or recommended OTC medications. Patient was informed to notify office of any new, worsening, or persistent symptoms. Patient verbalized understanding and agreement with plan of care.     Follow-Up  Return if symptoms worsen or fail to improve.      Electronically Signed  By:  CIARA Ballard/Transcription Disclaimer:  Please note that portions of this encounter note were completed using electronic transcription/translation of spoken language to printed text.  The electronic transcription/translation of spoken language may permit erroneous, or at times, nonsensical words or phrases to be inadvertently transcribed.  Although I have reviewed the note for such errors, some may still exist in this documentation.

## 2020-07-28 ENCOUNTER — TELEPHONE (OUTPATIENT)
Dept: INTERNAL MEDICINE | Facility: CLINIC | Age: 70
End: 2020-07-28

## 2020-07-28 NOTE — TELEPHONE ENCOUNTER
Urine Culture not collected on 7/23/20 would you like for me to contact patient to come back in for collection.

## 2020-07-29 ENCOUNTER — OFFICE VISIT (OUTPATIENT)
Dept: INTERNAL MEDICINE | Facility: CLINIC | Age: 70
End: 2020-07-29

## 2020-07-29 VITALS
HEART RATE: 76 BPM | BODY MASS INDEX: 34.96 KG/M2 | DIASTOLIC BLOOD PRESSURE: 70 MMHG | TEMPERATURE: 98.1 F | HEIGHT: 69 IN | SYSTOLIC BLOOD PRESSURE: 126 MMHG | WEIGHT: 236 LBS

## 2020-07-29 DIAGNOSIS — E66.9 OBESITY (BMI 30-39.9): ICD-10-CM

## 2020-07-29 DIAGNOSIS — K29.50 CHRONIC GASTRITIS WITHOUT BLEEDING, UNSPECIFIED GASTRITIS TYPE: ICD-10-CM

## 2020-07-29 DIAGNOSIS — E78.5 HYPERLIPIDEMIA, UNSPECIFIED HYPERLIPIDEMIA TYPE: ICD-10-CM

## 2020-07-29 DIAGNOSIS — M54.50 CHRONIC MIDLINE LOW BACK PAIN WITHOUT SCIATICA: ICD-10-CM

## 2020-07-29 DIAGNOSIS — Z99.89 OSA ON CPAP: ICD-10-CM

## 2020-07-29 DIAGNOSIS — E55.9 VITAMIN D DEFICIENCY: ICD-10-CM

## 2020-07-29 DIAGNOSIS — R35.0 URINARY FREQUENCY: Primary | ICD-10-CM

## 2020-07-29 DIAGNOSIS — M62.89 PELVIC FLOOR DYSFUNCTION: ICD-10-CM

## 2020-07-29 DIAGNOSIS — G47.33 OSA ON CPAP: ICD-10-CM

## 2020-07-29 DIAGNOSIS — G89.29 CHRONIC MIDLINE LOW BACK PAIN WITHOUT SCIATICA: ICD-10-CM

## 2020-07-29 DIAGNOSIS — J30.89 NON-SEASONAL ALLERGIC RHINITIS, UNSPECIFIED TRIGGER: ICD-10-CM

## 2020-07-29 PROCEDURE — 99204 OFFICE O/P NEW MOD 45 MIN: CPT | Performed by: PHYSICIAN ASSISTANT

## 2020-07-29 NOTE — TELEPHONE ENCOUNTER
Yes please, not sure what happened but any further treatment was pending culture results given UA results in office. Thank you.

## 2020-07-29 NOTE — TELEPHONE ENCOUNTER
HUB TO READ Message left for pt to return call to inform her that she will need to stop by office for Urine collection. Unable to obtain specimen at previous apt

## 2020-07-30 ENCOUNTER — TELEPHONE (OUTPATIENT)
Dept: INTERNAL MEDICINE | Facility: CLINIC | Age: 70
End: 2020-07-30

## 2020-07-30 NOTE — TELEPHONE ENCOUNTER
Patient wants to get advice/guidance on getting tested for covid19. She stated she tried to be seen at an urgent care for a sinus infection - symptoms: Sore throat & sinus pressure. These symptoms started last night.   She was told she can't be seen at the urgent care without having a covid test first. She would like to know what she should do if/ where she should get tested.   Call back number is: [phone]

## 2020-07-31 ENCOUNTER — E-VISIT (OUTPATIENT)
Dept: FAMILY MEDICINE CLINIC | Facility: TELEHEALTH | Age: 70
End: 2020-07-31

## 2020-07-31 DIAGNOSIS — J01.91 ACUTE RECURRENT SINUSITIS, UNSPECIFIED LOCATION: Primary | ICD-10-CM

## 2020-07-31 PROCEDURE — 99421 OL DIG E/M SVC 5-10 MIN: CPT | Performed by: NURSE PRACTITIONER

## 2020-07-31 RX ORDER — AMOXICILLIN AND CLAVULANATE POTASSIUM 875; 125 MG/1; MG/1
1 TABLET, FILM COATED ORAL 2 TIMES DAILY
Qty: 20 TABLET | Refills: 0 | Status: SHIPPED | OUTPATIENT
Start: 2020-07-31 | End: 2020-08-10

## 2020-07-31 NOTE — PROGRESS NOTES
Keisha Malagon    1950  [id number]    I have reviewed the e-Visit questionnaire and patient's answers, my assessment and plan are as follows:    Questionnaire:   --symptoms: congested nose, HA scratchy throat  --exposure to flu or COVID-19 in past 14 days:  no  --sudden loss of taste or smell:  None reported       HPI: Keisha Malagon is a 68 yo female with complaints of congested nose, pain around the nose and face, HA, ear pain and scratchy throat for 1-2 weeks with no fever. She has tried OtC pain medications, nose spray, allergy medication, decongestants and sinus rinses with no improvements. f      Review of Systems - ENT ROS: positive for - headaches, nasal congestion, sinus pain, sore throat and ear pain   negative for - nasal discharge, sneezing, fever or cough       Diagnoses and all orders for this visit:    Acute recurrent sinusitis, unspecified location    Other orders  -     amoxicillin-clavulanate (Augmentin) 875-125 MG per tablet; Take 1 tablet by mouth 2 (Two) Times a Day for 10 days.        Any medications prescribed have been sent electronically to   [email or web address] - [place], [place] - [address] [place] [address] - [phone] PH - [phone] [address] [address]  Phone: [phone] Fax: [phone]    [facility] [address] [place] [address] - [phone] PH - [phone]   [address] [facility] [address]  Phone: [phone] Fax: [phone]    [id number] [place], [place] - [address] [facility] [address]  [phone] PH - [phone] FX  [address] [facility] [address]  Phone: [phone] Fax: [phone]      Time Documentation  Counseled patient  Counseling topics: diagnosis, treatment options, follow up plan and return instructions  Total encounter time: counseling time more than 50% of visit: 8minutes         Kiarra Angulo  CIARA  07/31/20  10:29 AM

## 2020-07-31 NOTE — PATIENT INSTRUCTIONS
Drink plenty of fluids  Try nasal saline spray  If symptoms do not improve in 3-5 more days or worsen at any time you will need to follow up with your primary care provider or urgent care for further evaluation    If you develop shortness of breath or chest pain go to the emergency room.     Sinusitis, Adult  Sinusitis is inflammation of your sinuses. Sinuses are hollow spaces in the bones around your face. Your sinuses are located:  · Around your eyes.  · In the middle of your forehead.  · Behind your nose.  · In your cheekbones.  Mucus normally drains out of your sinuses. When your nasal tissues become inflamed or swollen, mucus can become trapped or blocked. This allows bacteria, viruses, and fungi to grow, which leads to infection. Most infections of the sinuses are caused by a virus.  Sinusitis can develop quickly. It can last for up to 4 weeks (acute) or for more than 12 weeks (chronic). Sinusitis often develops after a cold.  What are the causes?  This condition is caused by anything that creates swelling in the sinuses or stops mucus from draining. This includes:  · Allergies.  · Asthma.  · Infection from bacteria or viruses.  · Deformities or blockages in your nose or sinuses.  · Abnormal growths in the nose (nasal polyps).  · Pollutants, such as chemicals or irritants in the air.  · Infection from fungi (rare).  What increases the risk?  You are more likely to develop this condition if you:  · Have a weak body defense system (immune system).  · Do a lot of swimming or diving.  · Overuse nasal sprays.  · Smoke.  What are the signs or symptoms?  The main symptoms of this condition are pain and a feeling of pressure around the affected sinuses. Other symptoms include:  · Stuffy nose or congestion.  · Thick drainage from your nose.  · Swelling and warmth over the affected sinuses.  · Headache.  · Upper toothache.  · A cough that may get worse at night.  · Extra mucus that collects in the throat or the back  of the nose (postnasal drip).  · Decreased sense of smell and taste.  · Fatigue.  · A fever.  · Sore throat.  · Bad breath.  How is this diagnosed?  This condition is diagnosed based on:  · Your symptoms.  · Your medical history.  · A physical exam.  · Tests to find out if your condition is acute or chronic. This may include:  ? Checking your nose for nasal polyps.  ? Viewing your sinuses using a device that has a light (endoscope).  ? Testing for allergies or bacteria.  ? Imaging tests, such as an MRI or CT scan.  In rare cases, a bone biopsy may be done to rule out more serious types of fungal sinus disease.  How is this treated?  Treatment for sinusitis depends on the cause and whether your condition is chronic or acute.  · If caused by a virus, your symptoms should go away on their own within 10 days. You may be given medicines to relieve symptoms. They include:  ? Medicines that shrink swollen nasal passages (topical intranasal decongestants).  ? Medicines that treat allergies (antihistamines).  ? A spray that eases inflammation of the nostrils (topical intranasal corticosteroids).  ? Rinses that help get rid of thick mucus in your nose (nasal saline washes).  · If caused by bacteria, your health care provider may recommend waiting to see if your symptoms improve. Most bacterial infections will get better without antibiotic medicine. You may be given antibiotics if you have:  ? A severe infection.  ? A weak immune system.  · If caused by narrow nasal passages or nasal polyps, you may need to have surgery.  Follow these instructions at home:  Medicines  · Take, use, or apply over-the-counter and prescription medicines only as told by your health care provider. These may include nasal sprays.  · If you were prescribed an antibiotic medicine, take it as told by your health care provider. Do not stop taking the antibiotic even if you start to feel better.  Hydrate and humidify    · Drink enough fluid to keep your  urine pale yellow. Staying hydrated will help to thin your mucus.  · Use a cool mist humidifier to keep the humidity level in your home above 50%.  · Inhale steam for 10-15 minutes, 3-4 times a day, or as told by your health care provider. You can do this in the bathroom while a hot shower is running.  · Limit your exposure to cool or dry air.  Rest  · Rest as much as possible.  · Sleep with your head raised (elevated).  · Make sure you get enough sleep each night.  General instructions    · Apply a warm, moist washcloth to your face 3-4 times a day or as told by your health care provider. This will help with discomfort.  · Wash your hands often with soap and water to reduce your exposure to germs. If soap and water are not available, use hand .  · Do not smoke. Avoid being around people who are smoking (secondhand smoke).  · Keep all follow-up visits as told by your health care provider. This is important.  Contact a health care provider if:  · You have a fever.  · Your symptoms get worse.  · Your symptoms do not improve within 10 days.  Get help right away if:  · You have a severe headache.  · You have persistent vomiting.  · You have severe pain or swelling around your face or eyes.  · You have vision problems.  · You develop confusion.  · Your neck is stiff.  · You have trouble breathing.  Summary  · Sinusitis is soreness and inflammation of your sinuses. Sinuses are hollow spaces in the bones around your face.  · This condition is caused by nasal tissues that become inflamed or swollen. The swelling traps or blocks the flow of mucus. This allows bacteria, viruses, and fungi to grow, which leads to infection.  · If you were prescribed an antibiotic medicine, take it as told by your health care provider. Do not stop taking the antibiotic even if you start to feel better.  · Keep all follow-up visits as told by your health care provider. This is important.  This information is not intended to replace  advice given to you by your health care provider. Make sure you discuss any questions you have with your health care provider.  Document Released: 12/18/2006 Document Revised: 05/20/2019 Document Reviewed: 05/20/2019  Elsevier Patient Education © 2020 [employer]

## 2020-08-02 PROBLEM — M62.89 PELVIC FLOOR DYSFUNCTION: Status: ACTIVE | Noted: 2020-08-02

## 2020-08-02 PROBLEM — E55.9 VITAMIN D DEFICIENCY: Status: ACTIVE | Noted: 2020-08-02

## 2020-08-02 NOTE — PATIENT INSTRUCTIONS

## 2020-08-03 NOTE — TELEPHONE ENCOUNTER
Called patient she said she did an E-Visit on Friday with Kiarra Angulo was given Augmentin and feels great

## 2020-08-06 ENCOUNTER — E-VISIT (OUTPATIENT)
Dept: FAMILY MEDICINE CLINIC | Facility: TELEHEALTH | Age: 70
End: 2020-08-06

## 2020-08-06 DIAGNOSIS — H92.02 OTALGIA OF LEFT EAR: Primary | ICD-10-CM

## 2020-08-06 DIAGNOSIS — R51.9 NONINTRACTABLE HEADACHE, UNSPECIFIED CHRONICITY PATTERN, UNSPECIFIED HEADACHE TYPE: ICD-10-CM

## 2020-08-06 PROCEDURE — 99421 OL DIG E/M SVC 5-10 MIN: CPT | Performed by: NURSE PRACTITIONER

## 2020-08-07 NOTE — PROGRESS NOTES
CHIEF COMPLAINT  Earache and headache    --Symptoms: headache and ear pain,   --In the last 14 day, have you had contact with anyone who is ill, has shown any of the symptoms listed above and/or has been diagnosed with 2019 Novel Coronavirus? This includes any immediate household member but excludes any patients with whom you have been in contact within your normal work duties wearing proper PPE, if you are a healthcare worker:no    HPI  Keisha Malagon is a 69 y.o. female  presents with complaint of headache and earache. Reports symptoms started 1-4 days ago. Reports no fever. + former smoker. Reports now her left earache. Denies fever and chills. Reports she is taken antibiotic, nasal spray and decongestant. Reports the Augmentin cleared her sinus infection but now her ears are hurting.     Review of Systems     General ROS: no fever or chills  ENT ROS: sinus congestion and sore throat- almost resolved ear ache  GI ROS: no nausea or vomiting  Reviewed chart and patient information      Past Medical History:   Diagnosis Date   • Allergic    • Breast injury 11/2016    pt feel and injured left breast, bruise has cleared, but still feels a lump in that area   • DDD (degenerative disc disease), lumbar    • GERD (gastroesophageal reflux disease)    • Irritable bowel syndrome    • Kidney stone        Family History   Problem Relation Age of Onset   • Ovarian cancer Maternal Grandmother 67   • Arthritis Mother    • Osteoporosis Mother    • Sjogren's syndrome Mother    • Lung disease Mother         ILD due to sjogrens   • Stroke Father    • No Known Problems Son    • No Known Problems Daughter    • Breast cancer Neg Hx        Social History     Socioeconomic History   • Marital status: Single     Spouse name: Not on file   • Number of children: Not on file   • Years of education: Not on file   • Highest education level: Not on file   Tobacco Use   • Smoking status: Former Smoker     Packs/day: 0.25     Years: 1.00     Pack  years: 0.25     Types: Cigarettes     Last attempt to quit: 1968     Years since quittin.6   • Smokeless tobacco: Never Used   • Tobacco comment: 4 cigarettes a week senior year of highschool   Substance and Sexual Activity   • Alcohol use: No   • Drug use: Not Currently     Types: Marijuana     Comment: in high school   • Sexual activity: Defer         There were no vitals taken for this visit.    PHYSICAL EXAM  Virtual Visit Physical Exam    Results for orders placed or performed in visit on 20   POC Urinalysis Dipstick, Automated   Result Value Ref Range    Color Yellow Yellow, Straw, Dark Yellow, Giovana    Clarity, UA Clear Clear    Specific Gravity  1.010 1.005 - 1.030    pH, Urine 6.0 5.0 - 8.0    Leukocytes Negative Negative    Nitrite, UA Negative Negative    Protein, POC Negative Negative mg/dL    Glucose, UA Negative Negative, 1000 mg/dL (3+) mg/dL    Ketones, UA Negative Negative    Urobilinogen, UA Normal Normal    Bilirubin Negative Negative    Blood, UA Negative Negative       Diagnoses and all orders for this visit:    Otalgia of left ear    Nonintractable headache, unspecified chronicity pattern, unspecified headache type        **if at any time, experiences fever AND/OR worsening cough AND/OR shortness of breath, has been advised to go to nearest urgent care or emergency department for evaluation AND/OR testing- go to     **if no improvement, but not worsening, may schedule a f/u virtual visit or E-visit      FOLLOW-UP  Go to Urgent Care for further evaluation  Of her ears and headache  Was considering ear drop for symptoms- noted patient allergic to quinolones.   Pt finishing full course of augmentin for sinus infection. Need further evaluation prior to adding another antibiotic.     Patient verbalizes understanding of medication dosage, comfort measures, instructions for treatment and follow-up.    Mary Laughlin, CIARA  2020  5:27 AM    This visit was performed via Telehealth.   This patient has been instructed to follow-up with their primary care provider if their symptoms worsen or the treatment provided does not resolve their illness.

## 2020-08-07 NOTE — PATIENT INSTRUCTIONS
Earache, Adult  An earache, or ear pain, can be caused by many things, including:  · An infection.  · Ear wax buildup.  · Ear pressure.  · Something in the ear that should not be there (foreign body).  · A sore throat.  · Tooth problems.  · Jaw problems.  Treatment of the earache will depend on the cause. If the cause is not clear or cannot be determined, you may need to watch your symptoms until your earache goes away or until a cause is found.  Follow these instructions at home:  Pay attention to any changes in your symptoms. Take these actions to help with your pain:  · Take or apply over-the-counter and prescription medicines only as told by your health care provider.  · If you were prescribed an antibiotic medicine, use it as told by your health care provider. Do not stop using the antibiotic even if you start to feel better.  · Do not put anything in your ear other than medicine that is prescribed by your health care provider.  · If directed, apply heat to the affected area as often as told by your health care provider. Use the heat source that your health care provider recommends, such as a moist heat pack or a heating pad.  ? Place a towel between your skin and the heat source.  ? Leave the heat on for 20-30 minutes.  ? Remove the heat if your skin turns bright red. This is especially important if you are unable to feel pain, heat, or cold. You may have a greater risk of getting burned.  · If directed, put ice on the ear:  ? Put ice in a plastic bag.  ? Place a towel between your skin and the bag.  ? Leave the ice on for 20 minutes, 2-3 times a day.  · Try resting in an upright position instead of lying down. This may help to reduce pressure in your ear and relieve pain.  · Chew gum if it helps to relieve your ear pain.  · Treat any allergies as told by your health care provider.  · Keep all follow-up visits as told by your health care provider. This is important.  Contact a health care provider if:  · Your  pain does not improve within 2 days.  · Your earache gets worse.  · You have new symptoms.  · You have a fever.  Get help right away if:  · You have a severe headache.  · You have a stiff neck.  · You have trouble swallowing.  · You have redness or swelling behind your ear.  · You have fluid or blood coming from your ear.  · You have hearing loss.  · You feel dizzy.    Please go to Urgent Care for further evaluation of ear pain.   Please call before making trip to [place].  Wear mask please    [address] [phone]  [address] [phone]  [address] [facility] [phone]    ER for worsening symptoms       This information is not intended to replace advice given to you by your health care provider. Make sure you discuss any questions you have with your health care provider.  Document Released: 08/04/2005 Document Revised: 11/30/2018 Document Reviewed: 06/12/2017  Elsevier Patient Education © 2020 [employer]

## 2020-08-28 ENCOUNTER — LAB (OUTPATIENT)
Dept: LAB | Facility: HOSPITAL | Age: 70
End: 2020-08-28

## 2020-08-28 DIAGNOSIS — G89.29 CHRONIC MIDLINE LOW BACK PAIN WITHOUT SCIATICA: ICD-10-CM

## 2020-08-28 DIAGNOSIS — M54.50 CHRONIC MIDLINE LOW BACK PAIN WITHOUT SCIATICA: ICD-10-CM

## 2020-08-28 DIAGNOSIS — E55.9 VITAMIN D DEFICIENCY: ICD-10-CM

## 2020-08-28 DIAGNOSIS — E78.5 HYPERLIPIDEMIA, UNSPECIFIED HYPERLIPIDEMIA TYPE: ICD-10-CM

## 2020-08-28 DIAGNOSIS — R35.0 URINARY FREQUENCY: ICD-10-CM

## 2020-08-28 LAB
25(OH)D3 SERPL-MCNC: 31.4 NG/ML (ref 30–100)
ALBUMIN SERPL-MCNC: 4.4 G/DL (ref 3.5–5.2)
ALBUMIN UR-MCNC: <1.2 MG/DL
ALBUMIN/GLOB SERPL: 1.6 G/DL
ALP SERPL-CCNC: 51 U/L (ref 39–117)
ALT SERPL W P-5'-P-CCNC: 23 U/L (ref 1–33)
ANION GAP SERPL CALCULATED.3IONS-SCNC: 11.9 MMOL/L (ref 5–15)
AST SERPL-CCNC: 22 U/L (ref 1–32)
BASOPHILS # BLD AUTO: 0.03 10*3/MM3 (ref 0–0.2)
BASOPHILS NFR BLD AUTO: 0.6 % (ref 0–1.5)
BILIRUB SERPL-MCNC: 0.5 MG/DL (ref 0–1.2)
BUN SERPL-MCNC: 15 MG/DL (ref 8–23)
BUN/CREAT SERPL: 22.4 (ref 7–25)
CALCIUM SPEC-SCNC: 9.6 MG/DL (ref 8.6–10.5)
CHLORIDE SERPL-SCNC: 101 MMOL/L (ref 98–107)
CHOLEST SERPL-MCNC: 279 MG/DL (ref 0–200)
CO2 SERPL-SCNC: 26.1 MMOL/L (ref 22–29)
CREAT SERPL-MCNC: 0.67 MG/DL (ref 0.57–1)
DEPRECATED RDW RBC AUTO: 45.5 FL (ref 37–54)
EOSINOPHIL # BLD AUTO: 0.06 10*3/MM3 (ref 0–0.4)
EOSINOPHIL NFR BLD AUTO: 1.3 % (ref 0.3–6.2)
ERYTHROCYTE [DISTWIDTH] IN BLOOD BY AUTOMATED COUNT: 12.7 % (ref 12.3–15.4)
GFR SERPL CREATININE-BSD FRML MDRD: 87 ML/MIN/1.73
GLOBULIN UR ELPH-MCNC: 2.8 GM/DL
GLUCOSE SERPL-MCNC: 90 MG/DL (ref 65–99)
HCT VFR BLD AUTO: 42.7 % (ref 34–46.6)
HDLC SERPL-MCNC: 46 MG/DL (ref 40–60)
HGB BLD-MCNC: 14.9 G/DL (ref 12–15.9)
IMM GRANULOCYTES # BLD AUTO: 0.01 10*3/MM3 (ref 0–0.05)
IMM GRANULOCYTES NFR BLD AUTO: 0.2 % (ref 0–0.5)
LDLC SERPL CALC-MCNC: 161 MG/DL (ref 0–100)
LDLC/HDLC SERPL: 3.5 {RATIO}
LYMPHOCYTES # BLD AUTO: 1.87 10*3/MM3 (ref 0.7–3.1)
LYMPHOCYTES NFR BLD AUTO: 39.7 % (ref 19.6–45.3)
MCH RBC QN AUTO: 33.8 PG (ref 26.6–33)
MCHC RBC AUTO-ENTMCNC: 34.9 G/DL (ref 31.5–35.7)
MCV RBC AUTO: 96.8 FL (ref 79–97)
MONOCYTES # BLD AUTO: 0.46 10*3/MM3 (ref 0.1–0.9)
MONOCYTES NFR BLD AUTO: 9.8 % (ref 5–12)
NEUTROPHILS NFR BLD AUTO: 2.28 10*3/MM3 (ref 1.7–7)
NEUTROPHILS NFR BLD AUTO: 48.4 % (ref 42.7–76)
NRBC BLD AUTO-RTO: 0 /100 WBC (ref 0–0.2)
PLATELET # BLD AUTO: 277 10*3/MM3 (ref 140–450)
PMV BLD AUTO: 9.5 FL (ref 6–12)
POTASSIUM SERPL-SCNC: 4.3 MMOL/L (ref 3.5–5.2)
PROT SERPL-MCNC: 7.2 G/DL (ref 6–8.5)
RBC # BLD AUTO: 4.41 10*6/MM3 (ref 3.77–5.28)
SODIUM SERPL-SCNC: 139 MMOL/L (ref 136–145)
TRIGL SERPL-MCNC: 361 MG/DL (ref 0–150)
VLDLC SERPL-MCNC: 72.2 MG/DL (ref 5–40)
WBC # BLD AUTO: 4.71 10*3/MM3 (ref 3.4–10.8)

## 2020-08-28 PROCEDURE — 85025 COMPLETE CBC W/AUTO DIFF WBC: CPT

## 2020-08-28 PROCEDURE — 82306 VITAMIN D 25 HYDROXY: CPT

## 2020-08-28 PROCEDURE — 80061 LIPID PANEL: CPT

## 2020-08-28 PROCEDURE — 80053 COMPREHEN METABOLIC PANEL: CPT

## 2020-08-28 PROCEDURE — 82043 UR ALBUMIN QUANTITATIVE: CPT

## 2020-08-30 DIAGNOSIS — E78.2 MIXED HYPERLIPIDEMIA: Primary | ICD-10-CM

## 2020-08-30 RX ORDER — ROSUVASTATIN CALCIUM 10 MG/1
10 TABLET, COATED ORAL DAILY
Qty: 90 TABLET | Refills: 1 | Status: SHIPPED | OUTPATIENT
Start: 2020-08-30 | End: 2020-09-04

## 2020-09-04 ENCOUNTER — OFFICE VISIT (OUTPATIENT)
Dept: INTERNAL MEDICINE | Facility: CLINIC | Age: 70
End: 2020-09-04

## 2020-09-04 VITALS
WEIGHT: 242.6 LBS | HEIGHT: 69 IN | TEMPERATURE: 97.3 F | BODY MASS INDEX: 35.93 KG/M2 | SYSTOLIC BLOOD PRESSURE: 128 MMHG | DIASTOLIC BLOOD PRESSURE: 72 MMHG | HEART RATE: 80 BPM

## 2020-09-04 DIAGNOSIS — E66.01 MORBIDLY OBESE (HCC): ICD-10-CM

## 2020-09-04 DIAGNOSIS — M54.50 CHRONIC MIDLINE LOW BACK PAIN WITHOUT SCIATICA: ICD-10-CM

## 2020-09-04 DIAGNOSIS — M62.89 PELVIC FLOOR DYSFUNCTION: ICD-10-CM

## 2020-09-04 DIAGNOSIS — E78.5 HYPERLIPIDEMIA, UNSPECIFIED HYPERLIPIDEMIA TYPE: Primary | ICD-10-CM

## 2020-09-04 DIAGNOSIS — G47.33 OSA ON CPAP: ICD-10-CM

## 2020-09-04 DIAGNOSIS — Z99.89 OSA ON CPAP: ICD-10-CM

## 2020-09-04 DIAGNOSIS — G89.29 CHRONIC MIDLINE LOW BACK PAIN WITHOUT SCIATICA: ICD-10-CM

## 2020-09-04 DIAGNOSIS — E55.9 VITAMIN D DEFICIENCY: ICD-10-CM

## 2020-09-04 PROCEDURE — 99214 OFFICE O/P EST MOD 30 MIN: CPT | Performed by: PHYSICIAN ASSISTANT

## 2020-09-04 PROCEDURE — G0439 PPPS, SUBSEQ VISIT: HCPCS | Performed by: PHYSICIAN ASSISTANT

## 2020-09-04 PROCEDURE — 99397 PER PM REEVAL EST PAT 65+ YR: CPT | Performed by: PHYSICIAN ASSISTANT

## 2020-09-04 NOTE — ASSESSMENT & PLAN NOTE
Obesity is worsening.  Discussed the patient's BMI.  The BMI is above average; BMI management plan is completed.  General weight loss/lifestyle modification strategies discussed (elicit support from others; identify saboteurs; non-food rewards, etc).  Diet interventions: low calorie (1000 kCal/d) deficit diet.  Informal exercise measures discussed, e.g. taking stairs instead of elevator.  Regular aerobic exercise program discussed.

## 2020-09-04 NOTE — PROGRESS NOTES
The ABCs of the Annual Wellness Visit  Subsequent Medicare Wellness Visit    Chief Complaint   Patient presents with   • Annual Exam   • Back Pain     getting physical therapy and very beneficial.  Also getting accupuncture       Subjective   History of Present Illness:  Keisha Malagon is a 69 y.o. female with history of hyperlipidemia, obstructive sleep apnea on CPAP, obesity, vitamin D deficiency, low back pain with sciatica, and pelvic floor dysfunction presents for a Subsequent Medicare Wellness Visit and annual physical.  She sees [facility] orthopedics for back and shoulder pain.  Pt has going to [facility] for her low back pain.  She has been going there three times a week and has had significant improvement. She also has an order for pelvic physical therapy next at same location.  She has previously been treated for overactive bladder, but discontinued the medication.  She is current on colonoscopy-follows up every year with Dr. King.  She is compliant with her CPAP and uses it nightly as directed.  She denies chest pain, shortness of breath, or palpitations.    HEALTH RISK ASSESSMENT    Recent Hospitalizations:  No hospitalization(s) within the last year.    Current Medical Providers:  Patient Care Team:  Patt Yoon PA-C as PCP - General (Internal Medicine)  Bryn King MD as Consulting Physician (Gastroenterology)  Gilda Choi MD as Surgeon (Orthopedic Surgery)  Gerry Montero MD as Surgeon (Orthopedic Surgery)  Kartik Hamlin MD as Consulting Physician (Ophthalmology)  Braden Ndiaye MD as Consulting Physician (Urology)    Smoking Status:  Social History     Tobacco Use   Smoking Status Former Smoker   • Packs/day: 0.25   • Years: 1.00   • Pack years: 0.25   • Types: Cigarettes   • Last attempt to quit:    • Years since quittin.7   Smokeless Tobacco Never Used   Tobacco Comment    4 cigarettes a week senior year of [employer]        Alcohol Consumption:  Social History     Substance and Sexual Activity   Alcohol Use No       Depression Screen:   PHQ-2/PHQ-9 Depression Screening 9/4/2020   Little interest or pleasure in doing things 0   Feeling down, depressed, or hopeless 0   Total Score 0       Fall Risk Screen:  SUDHAKAR Fall Risk Assessment was completed, and patient is at HIGH risk for falls. Assessment completed on:9/4/2020    Health Habits and Functional and Cognitive Screening:  Functional & Cognitive Status 9/4/2020   Do you have difficulty preparing food and eating? No   Do you have difficulty bathing yourself, getting dressed or grooming yourself? No   Do you have difficulty using the toilet? No   Do you have difficulty moving around from place to place? No   Do you have trouble with steps or getting out of a bed or a chair? No   Current Diet Well Balanced Diet   Dental Exam Up to date   Eye Exam Up to date   Exercise (times per week) 3 times per week   Do you need help using the phone?  No   Are you deaf or do you have serious difficulty hearing?  No   Do you need help with transportation? No   Do you need help shopping? No   Do you need help preparing meals?  No   Do you need help with housework?  No   Do you need help with laundry? No   Do you need help taking your medications? No   Do you need help managing money? No   Do you ever drive or ride in a car without wearing a seat belt? No   Have you felt unusual stress, anger or loneliness in the last month? No   Who do you live with? Alone   If you need help, do you have trouble finding someone available to you? Yes   Have you been bothered in the last four weeks by sexual problems? No   Do you have difficulty concentrating, remembering or making decisions? No         Does the patient have evidence of cognitive impairment? No    Asprin use counseling:Does not need ASA (and currently is not on it)    Age-appropriate Screening Schedule:  Refer to the list below for future screening  recommendations based on patient's age, sex and/or medical conditions. Orders for these recommended tests are listed in the plan section. The patient has been provided with a written plan.    Health Maintenance   Topic Date Due   • ZOSTER VACCINE (1 of 2) 10/11/1999   • INFLUENZA VACCINE  08/01/2020   • LIPID PANEL  08/28/2021   • MAMMOGRAM  07/18/2022   • COLONOSCOPY  07/20/2023   • TDAP/TD VACCINES (2 - Td) 02/10/2027          The following portions of the patient's history were reviewed and updated as appropriate: allergies, current medications, past family history, past medical history, past social history, past surgical history and problem list.    Outpatient Medications Prior to Visit   Medication Sig Dispense Refill   • acetaminophen (TYLENOL) 650 MG 8 hr tablet Take 650 mg by mouth 2 (Two) Times a Day. Takes 2 tablets twice     • Azelastine-Fluticasone 137-50 MCG/ACT suspension 1 spray into the nostril(s) as directed by provider 2 (Two) Times a Day As Needed (allergies). 1 bottle 5   • B Complex-Biotin-FA (SUPER B-COMPLEX) tablet Take  by mouth.     • baclofen (LIORESAL) 10 MG tablet Take 1 tablet by mouth Daily. (Patient taking differently: Take 10 mg by mouth As Needed.) 90 tablet 0   • cholecalciferol (VITAMIN D3) 25 MCG (1000 UT) tablet Take 1,000 Units by mouth Daily.     • Collagen 500 MG capsule Take  by mouth Daily.     • DEXILANT 60 MG capsule Take 1 capsule by mouth Daily.     • diclofenac (VOLTAREN) 1 % gel gel Uses it as  needed     • Digestive Enzymes capsule Take  by mouth Daily.     • estradiol (VAGIFEM) 10 MCG tablet vaginal tablet Insert 1 tablet into the vagina 2 (Two) Times a Week. Every 3 days     • fluticasone (FLONASE) 50 MCG/ACT nasal spray 2 sprays into the nostril(s) as directed by provider Daily. 3 bottle 2   • Garlic (GNP GARLIC EXTRACT) 400 MG tablet Take 1,000 mg by mouth Daily.     • Glucosamine-Chondroitin 250-200 MG tablet Take  by mouth Daily.     • icosapent ethyl (Vascepa)  1 g capsule capsule Take 2 g by mouth 2 (Two) Times a Day With Meals. (Patient taking differently: Take 2 g by mouth 2 (Two) Times a Day With Meals. Takes it once a day  Sometimes 2) 360 capsule 3   • Pediatric Multivit-Minerals-C (COMPLETE MULTI-VITAMIN PO) Take  by mouth.     • Probiotic Product (ALIGN EXTRA STRENGTH PO) Take  by mouth.     • traMADol (ULTRAM) 50 MG tablet Take 50 mg by mouth As Needed.     • oxyCODONE-acetaminophen (PERCOCET) 5-325 MG per tablet Take 1 tablet by mouth Every 6 (Six) Hours As Needed.     • rosuvastatin (CRESTOR) 10 MG tablet Take 1 tablet by mouth Daily. 90 tablet 1     No facility-administered medications prior to visit.        Patient Active Problem List   Diagnosis   • Chronic midline low back pain without sciatica   • Enlarged lymph nodes   • Chronic gastritis without bleeding   • SULAIMAN on CPAP   • Primary osteoarthritis involving multiple joints   • Overactive bladder   • Hyperlipidemia   • Non-seasonal allergic rhinitis   • Obesity (BMI 30-39.9)   • Pelvic floor dysfunction   • Vitamin D deficiency   • Morbidly obese (CMS/[facility])       Advanced Care Planning:  ACP discussion was held with the patient during this visit. Patient does not have an advance directive, information provided.    Review of Systems   Constitutional: Negative for chills, fatigue and fever.   HENT: Positive for congestion. Negative for ear pain and sinus pressure.    Respiratory: Positive for cough. Negative for chest tightness, shortness of breath and wheezing.    Cardiovascular: Negative for chest pain and palpitations.   Gastrointestinal: Negative for abdominal pain, blood in stool and constipation.   Endocrine: Negative for cold intolerance and heat intolerance.   Genitourinary: Positive for urgency.   Musculoskeletal: Positive for arthralgias and back pain.   Skin: Negative for color change.   Allergic/Immunologic: Negative for environmental allergies.   Neurological: Negative for dizziness, speech  "difficulty and headaches.   Psychiatric/Behavioral: Negative for confusion. The patient is not nervous/anxious.        Compared to one year ago, the patient feels her physical health is better.  Compared to one year ago, the patient feels her mental health is better.    Reviewed chart for potential of high risk medication in the elderly: yes  Reviewed chart for potential of harmful drug interactions in the elderly:yes    Objective         Vitals:    09/04/20 1006 09/04/20 1049   BP: 148/80 128/72   BP Location: Right arm    Patient Position: Sitting    Cuff Size: Adult    Pulse: 80    Temp: 97.3 °F (36.3 °C)    Weight: 110 kg (242 lb 9.6 oz)    Height: 175.8 cm (69.21\")        Body mass index is 35.61 kg/m².  Discussed the patient's BMI with her. The BMI is above average; BMI management plan is completed. Currently in PT     Physical Exam   Constitutional: She is oriented to person, place, and time. She appears well-developed and well-nourished.   HENT:   Head: Normocephalic and atraumatic.   Nose: Nose normal.   Mouth/Throat: Oropharynx is clear and moist.   enlarged, palpable submental nodes.    Eyes: Pupils are equal, round, and reactive to light. Conjunctivae and EOM are normal.   Neck: Normal range of motion. Neck supple.   Cardiovascular: Normal rate, regular rhythm, normal heart sounds and intact distal pulses.   Pulmonary/Chest: Effort normal and breath sounds normal.   Abdominal: Soft. Bowel sounds are normal. There is no tenderness. There is no guarding.   Musculoskeletal:        Lumbar back: She exhibits decreased range of motion.   Neurological: She is alert and oriented to person, place, and time.   Skin: Skin is warm and dry.   Psychiatric: She has a normal mood and affect. Her behavior is normal. Judgment and thought content normal.   Nursing note and vitals reviewed.      Lab Results   Component Value Date    TRIG 361 (H) 08/28/2020    HDL 46 08/28/2020     (H) 08/28/2020    VLDL 72.2 (H) " 08/28/2020        Assessment/Plan   Medicare Risks and Personalized Health Plan  CMS Preventative Services Quick Reference  Inactivity/Sedentary    The above risks/problems have been discussed with the patient.  Pertinent information has been shared with the patient in the After Visit Summary.  Follow up plans and orders are seen below in the Assessment/Plan Section.    Diagnoses and all orders for this visit:    1. Hyperlipidemia, unspecified hyperlipidemia type (Primary)  Assessment & Plan:  Lipid abnormalities are worsening.  Nutritional counseling was provided.  Lipids will be reassessed in 6 months.  Pt declines statin.  Discussed weight loss and low fat, low cholesterol diet.      2. Chronic midline low back pain without sciatica  Assessment & Plan:  Significant improvement with PT three days a week.      3. SULAIMAN on CPAP  Assessment & Plan:  Continue nightly      4. Vitamin D deficiency  Assessment & Plan:  31.4.  Pt encouraged to continue over the counter vitamin D 2000IU daily.      5. Pelvic floor dysfunction  Assessment & Plan:  Order placed for pelvic PT.  Discussed importance of weight loss and core strengthening.      6. Morbidly obese (CMS/[facility])  Assessment & Plan:  Obesity is worsening.  Discussed the patient's BMI.  The BMI is above average; BMI management plan is completed.  General weight loss/lifestyle modification strategies discussed (elicit support from others; identify saboteurs; non-food rewards, etc).  Diet interventions: low calorie (1000 kCal/d) deficit diet.  Informal exercise measures discussed, e.g. taking stairs instead of elevator.  Regular aerobic exercise program discussed.      Follow Up:  Return in about 3 months (around 12/4/2020) for Recheck.     An After Visit Summary and PPPS were given to the patient.     Patient Wellness Counseling:   Plan of care reviewed with patient at the conclusion of today's visit. Education was provided in regards to diagnosis, diet and exercise,   healthy weight,ways to reduce stress, adequate sleep, injury prevention,  dental health, mental health, and immunizations.    Plan of care reviewed with patient at the conclusion of today's visit. Education was provided regarding diagnosis, management and any prescribed or recommended OTC medications.  Patient verbalizes understanding of and agreement with management plan.     Return if symptoms worsen or fail to improve.    Patt Yoon

## 2020-09-04 NOTE — ASSESSMENT & PLAN NOTE
Lipid abnormalities are worsening.  Nutritional counseling was provided.  Lipids will be reassessed in 6 months.  Pt declines statin.  Discussed weight loss and low fat, low cholesterol diet.

## 2020-09-04 NOTE — PATIENT INSTRUCTIONS
Advance Directive    Advance directives are legal documents that let you make choices ahead of time about your health care and medical treatment in case you become unable to communicate for yourself. Advance directives are a way for you to communicate your wishes to family, friends, and health care providers. This can help convey your decisions about end-of-life care if you become unable to communicate.  Discussing and writing advance directives should happen over time rather than all at once. Advance directives can be changed depending on your situation and what you want, even after you have signed the advance directives.  If you do not have an advance directive, some states assign family decision makers to act on your behalf based on how closely you are related to them. Each state has its own laws regarding advance directives. You may want to check with your health care provider, , or state representative about the laws in your state. There are different types of advance directives, such as:  · Medical power of .  · Living will.  · Do not resuscitate (DNR) or do not attempt resuscitation (DNAR) order.  Health care proxy and medical power of   A health care proxy, also called a health care agent, is a person who is appointed to make medical decisions for you in cases in which you are unable to make the decisions yourself. Generally, people choose someone they know well and trust to represent their preferences. Make sure to ask this person for an agreement to act as your proxy. A proxy may have to exercise judgment in the event of a medical decision for which your wishes are not known.  A medical power of  is a legal document that names your health care proxy. Depending on the laws in your state, after the document is written, it may also need to be:  · Signed.  · Notarized.  · Dated.  · Copied.  · Witnessed.  · Incorporated into your medical record.  You may also want to appoint  someone to manage your financial affairs in a situation in which you are unable to do so. This is called a durable power of  for finances. It is a separate legal document from the durable power of  for health care. You may choose the same person or someone different from your health care proxy to act as your agent in financial matters.  If you do not appoint a proxy, or if there is a concern that the proxy is not acting in your best interests, a court-appointed guardian may be designated to act on your behalf.  Living will  A living will is a set of instructions documenting your wishes about medical care when you cannot express them yourself. Health care providers should keep a copy of your living will in your medical record. You may want to give a copy to family members or friends. To alert caregivers in case of an emergency, you can place a card in your wallet to let them know that you have a living will and where they can find it. A living will is used if you become:  · Terminally ill.  · Incapacitated.  · Unable to communicate or make decisions.  Items to consider in your living will include:  · The use or non-use of life-sustaining equipment, such as dialysis machines and breathing machines (ventilators).  · A DNR or DNAR order, which is the instruction not to use cardiopulmonary resuscitation (CPR) if breathing or heartbeat stops.  · The use or non-use of tube feeding.  · Withholding of food and fluids.  · Comfort (palliative) care when the goal becomes comfort rather than a cure.  · Organ and tissue donation.  A living will does not give instructions for distributing your money and property if you should pass away. It is recommended that you seek the advice of a  when writing a will. Decisions about taxes, beneficiaries, and asset distribution will be legally binding. This process can relieve your family and friends of any concerns surrounding disputes or questions that may come up about  the distribution of your assets.  DNR or DNAR  A DNR or DNAR order is a request not to have CPR in the event that your heart stops beating or you stop breathing. If a DNR or DNAR order has not been made and shared, a health care provider will try to help any patient whose heart has stopped or who has stopped breathing. If you plan to have surgery, talk with your health care provider about how your DNR or DNAR order will be followed if problems occur.  Summary  · Advance directives are the legal documents that allow you to make choices ahead of time about your health care and medical treatment in case you become unable to communicate for yourself.  · The process of discussing and writing advance directives should happen over time. You can change the advance directives, even after you have signed them.  · Advance directives include DNR or DNAR orders, living thorne, and designating an agent as your medical power of .  This information is not intended to replace advice given to you by your health care provider. Make sure you discuss any questions you have with your health care provider.  Document Released: 03/26/2009 Document Revised: 01/22/2020 Document Reviewed: 11/06/2017  [employer] Patient Education © 2020 [employer]  BMI for Adults    Body mass index (BMI) is a number that is calculated from a person's weight and height. BMI may help to estimate how much of a person's weight is composed of fat. BMI can help identify those who may be at higher risk for certain medical problems.  How is BMI used with adults?  BMI is used as a screening tool to identify possible weight problems. It is used to check whether a person is obese, overweight, healthy weight, or underweight.  How is BMI calculated?  BMI measures your weight and compares it to your height. This can be done either in English (U.S[place] or metric measurements. Note that charts are available to help you find your BMI quickly and easily without having to do  "these calculations yourself.  To calculate your BMI in English (U.S.) measurements, your health care provider will:  1. Measure your weight in pounds (lb).  2. Multiply the number of pounds by 703.  ? For example, for a person who weighs 180 lb, multiply that number by 703, which equals 126,540.  3. Measure your height in inches (in). Then multiply that number by itself to get a measurement called \"inches squared.\"  ? For example, for a person who is 70 in tall, the \"inches squared\" measurement is 70 in x 70 in, which equals 4900 inches squared.  4. Divide the total from Step 2 (number of lb x 703) by the total from Step 3 (inches squared): 126,540 ÷ 4900 = 25.8. This is your BMI.  To calculate your BMI in metric measurements, your health care provider will:  1. Measure your weight in kilograms (kg).  2. Measure your height in meters (m). Then multiply that number by itself to get a measurement called \"meters squared.\"  ? For example, for a person who is 1.75 m tall, the \"meters squared\" measurement is 1.75 m x 1.75 m, which is equal to 3.1 meters squared.  3. Divide the number of kilograms (your weight) by the meters squared number. In this example: 70 ÷ 3.1 = 22.6. This is your BMI.  How is BMI interpreted?  To interpret your results, your health care provider will use BMI charts to identify whether you are underweight, normal weight, overweight, or obese. The following guidelines will be used:  · Underweight: BMI less than 18.5.  · Normal weight: BMI between 18.5 and 24.9.  · Overweight: BMI between 25 and 29.9.  · Obese: BMI of 30 and above.  Please note:  · Weight includes both fat and muscle, so someone with a muscular build, such as an athlete, may have a BMI that is higher than 24.9. In cases like these, BMI is not an accurate measure of body fat.  · To determine if excess body fat is the cause of a BMI of 25 or higher, further assessments may need to be done by a health care provider.  · BMI is usually " interpreted in the same way for men and women.  Why is BMI a useful tool?  BMI is useful in two ways:  · Identifying a weight problem that may be related to a medical condition, or that may increase the risk for medical problems.  · Promoting lifestyle and diet changes in order to reach a healthy weight.  Summary  · Body mass index (BMI) is a number that is calculated from a person's weight and height.  · BMI may help to estimate how much of a person's weight is composed of fat. BMI can help identify those who may be at higher risk for certain medical problems.  · BMI can be measured using English measurements or metric measurements.  · To interpret your results, your health care provider will use BMI charts to identify whether you are underweight, normal weight, overweight, or obese.  This information is not intended to replace advice given to you by your health care provider. Make sure you discuss any questions you have with your health care provider.  Document Released: 2005 Document Revised: 2018 Document Reviewed: 10/31/2018  [employer] Patient Education ©  [employer]    Medicare Wellness  Personal Prevention Plan of Service     Date of Office Visit:  2020  Encounter Provider:  Patt Yoon PA-C  Place of Service:  [facility] INTERNAL MEDICINE  Patient Name: Keisha Malagon  :  1950    As part of the Medicare Wellness portion of your visit today, we are providing you with this personalized preventive plan of services (PPPS). This plan is based upon recommendations of the [place] Preventive Services Task Force (USPSTF) and the Advisory Committee on Immunization Practices (ACIP).    This lists the preventive care services that should be considered, and provides dates of when you are due. Items listed as completed are up-to-date and do not require any further intervention.    Health Maintenance   Topic Date Due   • ZOSTER VACCINE (1 of 2) 10/11/1999   •  INFLUENZA VACCINE  08/01/2020   • MEDICARE ANNUAL WELLNESS  08/06/2021   • LIPID PANEL  08/28/2021   • MAMMOGRAM  07/18/2022   • COLONOSCOPY  07/20/2023   • TDAP/TD VACCINES (2 - Td) 02/10/2027   • HEPATITIS C SCREENING  Completed   • Pneumococcal Vaccine Once at 65 Years Old  Completed       No orders of the defined types were placed in this encounter.      No follow-ups on file.

## 2020-10-10 ENCOUNTER — E-VISIT (OUTPATIENT)
Dept: FAMILY MEDICINE CLINIC | Facility: TELEHEALTH | Age: 70
End: 2020-10-10

## 2020-10-10 DIAGNOSIS — J01.90 ACUTE NON-RECURRENT SINUSITIS, UNSPECIFIED LOCATION: ICD-10-CM

## 2020-10-10 DIAGNOSIS — J30.89 ALLERGIC REACTION TO INHALED DUST: Primary | ICD-10-CM

## 2020-10-10 PROCEDURE — 99422 OL DIG E/M SVC 11-20 MIN: CPT | Performed by: NURSE PRACTITIONER

## 2020-10-10 RX ORDER — DOXYCYCLINE 100 MG/1
100 CAPSULE ORAL 2 TIMES DAILY
Qty: 10 CAPSULE | Refills: 0 | Status: SHIPPED | OUTPATIENT
Start: 2020-10-10 | End: 2020-10-15

## 2020-10-10 RX ORDER — PREDNISONE 20 MG/1
40 TABLET ORAL DAILY
Qty: 10 TABLET | Refills: 0 | Status: SHIPPED | OUTPATIENT
Start: 2020-10-10 | End: 2020-10-15

## 2020-10-10 NOTE — PROGRESS NOTES
Keisha Malagon    1950  [id number]    I have reviewed the e-Visit questionnaire and patient's answers, my assessment and plan are as follows:    HPI- Pt reports nasal congestion, HA x 1 day. She states she was tilling her yard and breathed in a whole lot of dirt and dust. She reports feeling unwell after this. She has done a couple of sinus rinses and a lot of dirt came out. She believes some of it has gotten into her chest as well. She has a similar problem last year and ended up with Bronchitis and is requesting an antibiotic. She is not a smoker, denies fever.     Review of Systems - General ROS: negative for - fever  ENT ROS: positive for - headaches and nasal congestion      Diagnoses and all orders for this visit:    Allergic reaction to inhaled dust  -     predniSONE (DELTASONE) 20 MG tablet; Take 2 tablets by mouth Daily for 5 days.    Acute non-recurrent sinusitis, unspecified location  -     predniSONE (DELTASONE) 20 MG tablet; Take 2 tablets by mouth Daily for 5 days.    Other orders  -     doxycycline (MONODOX) 100 MG capsule; Take 1 capsule by mouth 2 (Two) Times a Day for 5 days.      Take medicine as prescribed.   Most likely your current symptoms are due to an allergic response to the inhaled dirt/dust. However, I have prescribed an antibiotic for you to prevent Pneumonia. The steroid will help decrease inflammation and calm the allergic response.  Monitor symptoms closely for wheezing, shortness of breath, chest pain, fever or cough.   Continue saline nasal rinses.   If symptoms worsen or do not improve follow up with your PCP or visit your nearest Urgent Care Center or ER.      Any medications prescribed have been sent electronically to   [facility] - [place], [place] - [address] [place] [address] - [phone]  - [phone]   [address] [place] [address]  Phone: [phone] Fax: [phone]    [address] -  [phone] PH - [phone] FX  [address] [facility] [address]  Phone: [phone] Fax: [phone]    [id number] - [place], [place] - [address] AT [facility] [address] - [phone] PH - [phone] FX  [address] [facility] [address]  Phone: [phone] Fax: [phone]    I spent 20 minutes reviewing this chart.     Berta Fulton, APRN  10/10/20  18:27 EDT

## 2021-01-05 RX ORDER — ESTRADIOL 10 UG/1
1 INSERT VAGINAL 2 TIMES WEEKLY
Qty: 8 TABLET | Status: CANCELLED | OUTPATIENT
Start: 2021-01-07

## 2021-01-05 RX ORDER — ESTRADIOL 10 UG/1
1 INSERT VAGINAL 2 TIMES WEEKLY
Qty: 24 TABLET | Refills: 3 | Status: SHIPPED | OUTPATIENT
Start: 2021-01-07 | End: 2021-04-16

## 2021-01-05 NOTE — TELEPHONE ENCOUNTER
Pharmacy requested 90 day refills on Estradiol   unknown date for last refill   Called patient she is still using this

## 2021-01-08 ENCOUNTER — TELEMEDICINE (OUTPATIENT)
Dept: INTERNAL MEDICINE | Facility: CLINIC | Age: 71
End: 2021-01-08

## 2021-01-08 DIAGNOSIS — G89.29 CHRONIC RIGHT SHOULDER PAIN: ICD-10-CM

## 2021-01-08 DIAGNOSIS — M62.89 PELVIC FLOOR DYSFUNCTION: Primary | ICD-10-CM

## 2021-01-08 DIAGNOSIS — M54.50 CHRONIC MIDLINE LOW BACK PAIN WITHOUT SCIATICA: ICD-10-CM

## 2021-01-08 DIAGNOSIS — M25.511 CHRONIC RIGHT SHOULDER PAIN: ICD-10-CM

## 2021-01-08 DIAGNOSIS — G89.29 CHRONIC MIDLINE LOW BACK PAIN WITHOUT SCIATICA: ICD-10-CM

## 2021-01-08 DIAGNOSIS — E66.01 MORBIDLY OBESE (HCC): ICD-10-CM

## 2021-01-08 DIAGNOSIS — R35.0 URINARY FREQUENCY: ICD-10-CM

## 2021-01-08 PROCEDURE — 99214 OFFICE O/P EST MOD 30 MIN: CPT | Performed by: PHYSICIAN ASSISTANT

## 2021-01-08 RX ORDER — DULOXETIN HYDROCHLORIDE 30 MG/1
30 CAPSULE, DELAYED RELEASE ORAL DAILY
Qty: 30 CAPSULE | Refills: 1 | Status: SHIPPED | OUTPATIENT
Start: 2021-01-08 | End: 2021-02-05

## 2021-01-08 NOTE — PROGRESS NOTES
Patient Care Team:  Patt Yoon PA-C as PCP - General (Internal Medicine)  Bryn King MD as Consulting Physician (Gastroenterology)  Gilda Choi MD as Surgeon (Orthopedic Surgery)  Gerry Montero MD as Surgeon (Orthopedic Surgery)  Kartik Hamlin MD as Consulting Physician (Ophthalmology)  Braden Ndiaye MD as Consulting Physician (Urology)    Chief Complaint::   Chief Complaint   Patient presents with   • Urinary Frequency   • Shoulder Pain      You have chosen to receive care through a video visit. Do you consent to use a video visit for your medical care today? Yes    Subjective     HPI  70 year old female with history of incontinence, chronic back and shoulder pain,  presents for follow up.  She recently had Moderna vaccine #1. She did go to [email or web address] for flu shot in September.   She was going to Dr. Montero for injections in mid and lower back. She did not have improvement with this.  [facility]-and having deep tissue massage. This helped with the pain.  She stopped going to PT due to COVID concerns. She has gained weight throughout the coronavirus pandemic.  She has worsening urinary symptoms. She reports incontinence with coughing, sneezing, or pressure. Has had cystocele and rectocele repair with hysterectomy by Dr. Vo in the past.  She had surgery on right rotator cuff 20 years ago.  10 years later, she had another surgery on her left shoulder.  She had scan on right shoulder and has nerve impingement.  She reports that her right shoulder is now very painful. She sees Dr. Bob, but she has retired.          The following portions of the patient's history were reviewed and updated as appropriate: active problem list, medication list, allergies, family history, social history    Review of Systems:   Review of Systems    Vital Signs  Vitals:    01/10/21 1306   Weight: 104 kg (230 lb)      Body mass index is 33.76 kg/m².    Labs  No visits with results within 3 Month(s) from this visit.   Latest known visit with results is:   Lab on 08/28/2020   Component Date Value Ref Range Status   • Glucose 08/28/2020 90  65 - 99 mg/dL Final   • BUN 08/28/2020 15  8 - 23 mg/dL Final   • Creatinine 08/28/2020 0.67  0.57 - 1.00 mg/dL Final   • Sodium 08/28/2020 139  136 - 145 mmol/L Final   • Potassium 08/28/2020 4.3  3.5 - 5.2 mmol/L Final   • Chloride 08/28/2020 101  98 - 107 mmol/L Final   • CO2 08/28/2020 26.1  22.0 - 29.0 mmol/L Final   • Calcium 08/28/2020 9.6  8.6 - 10.5 mg/dL Final   • Total Protein 08/28/2020 7.2  6.0 - 8.5 g/dL Final   • Albumin 08/28/2020 4.40  3.50 - 5.20 g/dL Final   • ALT (SGPT) 08/28/2020 23  1 - 33 U/L Final   • AST (SGOT) 08/28/2020 22  1 - 32 U/L Final   • Alkaline Phosphatase 08/28/2020 51  39 - 117 U/L Final   • Total Bilirubin 08/28/2020 0.5  0.0 - 1.2 mg/dL Final   • eGFR Non  Amer 08/28/2020 87  >60 mL/min/1.73 Final   • Globulin 08/28/2020 2.8  gm/dL Final   • A/G Ratio 08/28/2020 1.6  g/dL Final   • BUN/Creatinine Ratio 08/28/2020 22.4  7.0 - 25.0 Final   • Anion Gap 08/28/2020 11.9  5.0 - 15.0 mmol/L Final   • Total Cholesterol 08/28/2020 279* 0 - 200 mg/dL Final   • Triglycerides 08/28/2020 361* 0 - 150 mg/dL Final   • HDL Cholesterol 08/28/2020 46  40 - 60 mg/dL Final   • LDL Cholesterol  08/28/2020 161* 0 - 100 mg/dL Final   • VLDL Cholesterol 08/28/2020 72.2* 5 - 40 mg/dL Final   • LDL/HDL Ratio 08/28/2020 3.50   Final   • Microalbumin, Urine 08/28/2020 <1.2  mg/dL Final   • 25 Hydroxy, Vitamin D 08/28/2020 31.4  30.0 - 100.0 ng/ml Final   • WBC 08/28/2020 4.71  3.40 - 10.80 10*3/mm3 Final   • RBC 08/28/2020 4.41  3.77 - 5.28 10*6/mm3 Final   • Hemoglobin 08/28/2020 14.9  12.0 - 15.9 g/dL Final   • Hematocrit 08/28/2020 42.7  34.0 - 46.6 % Final   • MCV 08/28/2020 96.8  79.0 - 97.0 fL Final   • MCH 08/28/2020 33.8* 26.6 - 33.0 pg Final   • MCHC 08/28/2020  34.9  31.5 - 35.7 g/dL Final   • RDW 08/28/2020 12.7  12.3 - 15.4 % Final   • RDW-SD 08/28/2020 45.5  37.0 - 54.0 fl Final   • MPV 08/28/2020 9.5  6.0 - 12.0 fL Final   • Platelets 08/28/2020 277  140 - 450 10*3/mm3 Final   • Neutrophil % 08/28/2020 48.4  42.7 - 76.0 % Final   • Lymphocyte % 08/28/2020 39.7  19.6 - 45.3 % Final   • Monocyte % 08/28/2020 9.8  5.0 - 12.0 % Final   • Eosinophil % 08/28/2020 1.3  0.3 - 6.2 % Final   • Basophil % 08/28/2020 0.6  0.0 - 1.5 % Final   • Immature Grans % 08/28/2020 0.2  0.0 - 0.5 % Final   • Neutrophils, Absolute 08/28/2020 2.28  1.70 - 7.00 10*3/mm3 Final   • Lymphocytes, Absolute 08/28/2020 1.87  0.70 - 3.10 10*3/mm3 Final   • Monocytes, Absolute 08/28/2020 0.46  0.10 - 0.90 10*3/mm3 Final   • Eosinophils, Absolute 08/28/2020 0.06  0.00 - 0.40 10*3/mm3 Final   • Basophils, Absolute 08/28/2020 0.03  0.00 - 0.20 10*3/mm3 Final   • Immature Grans, Absolute 08/28/2020 0.01  0.00 - 0.05 10*3/mm3 Final   • nRBC 08/28/2020 0.0  0.0 - 0.2 /100 WBC Final       Imaging  No radiology results for the last 30 days.      Current Outpatient Medications:   •  acetaminophen (TYLENOL) 650 MG 8 hr tablet, Take 650 mg by mouth 2 (Two) Times a Day. Takes 2 tablets twice, Disp: , Rfl:   •  Azelastine-Fluticasone 137-50 MCG/ACT suspension, 1 spray into the nostril(s) as directed by provider 2 (Two) Times a Day As Needed (allergies)., Disp: 1 bottle, Rfl: 5  •  B Complex-Biotin-FA (SUPER B-COMPLEX) tablet, Take  by mouth., Disp: , Rfl:   •  baclofen (LIORESAL) 10 MG tablet, Take 1 tablet by mouth Daily. (Patient taking differently: Take 10 mg by mouth As Needed.), Disp: 90 tablet, Rfl: 0  •  cholecalciferol (VITAMIN D3) 25 MCG (1000 UT) tablet, Take 1,000 Units by mouth Daily., Disp: , Rfl:   •  Collagen 500 MG capsule, Take  by mouth Daily., Disp: , Rfl:   •  DEXILANT 60 MG capsule, Take 1 capsule by mouth Daily., Disp: , Rfl:   •  diclofenac (VOLTAREN) 1 % gel gel, Apply twice daily, Disp: 1  tube, Rfl: 5  •  Digestive Enzymes capsule, Take  by mouth Daily., Disp: , Rfl:   •  DULoxetine (CYMBALTA) 30 MG capsule, Take 1 capsule by mouth Daily., Disp: 30 capsule, Rfl: 1  •  estradiol (VAGIFEM) 10 MCG tablet vaginal tablet, Insert 1 tablet into the vagina 2 (Two) Times a Week. Every 3 days, Disp: 24 tablet, Rfl: 3  •  fluticasone (FLONASE) 50 MCG/ACT nasal spray, 2 sprays into the nostril(s) as directed by provider Daily., Disp: 3 bottle, Rfl: 2  •  Garlic (GNP GARLIC EXTRACT) 400 MG tablet, Take 1,000 mg by mouth Daily., Disp: , Rfl:   •  Glucosamine-Chondroitin 250-200 MG tablet, Take  by mouth Daily., Disp: , Rfl:   •  icosapent ethyl (Vascepa) 1 g capsule capsule, Take 2 g by mouth 2 (Two) Times a Day With Meals. (Patient taking differently: Take 2 g by mouth 2 (Two) Times a Day With Meals. Takes it once a day  Sometimes 2), Disp: 360 capsule, Rfl: 3  •  Pediatric Multivit-Minerals-C (COMPLETE MULTI-VITAMIN PO), Take  by mouth., Disp: , Rfl:   •  Probiotic Product (ALIGN EXTRA STRENGTH PO), Take  by mouth., Disp: , Rfl:   •  traMADol (ULTRAM) 50 MG tablet, Take 50 mg by mouth As Needed., Disp: , Rfl:     Physical Exam:    Physical Exam  Constitutional:       Appearance: Normal appearance. She is obese.   HENT:      Head: Normocephalic and atraumatic.   Eyes:      Conjunctiva/sclera: Conjunctivae normal.      Pupils: Pupils are equal, round, and reactive to light.   Skin:     Coloration: Skin is not jaundiced or pale.   Neurological:      Mental Status: She is alert.   Psychiatric:         Mood and Affect: Mood normal.         Thought Content: Thought content normal.         Judgment: Judgment normal.         Procedures        Assessment/Plan   Problem List Items Addressed This Visit        Endocrine and Metabolic    Morbidly obese (CMS/HCC)    Overview     Body mass index is 33.76 kg/m². Discussed importance of weight loss.  Recommend low fat, low calorie diet.  Recommend daily cardiovascular exercise.  Goal is to lose 5 lbs in one month.               Genitourinary and Reproductive     Pelvic floor dysfunction - Primary    Overview     With stress incontinence.         Relevant Orders    Ambulatory Referral to Urology    Urinary frequency    Overview     Referral to urology.         Relevant Orders    Ambulatory Referral to Urology       Musculoskeletal and Injuries    Chronic midline low back pain without sciatica    Overview     Continue baclofen as needed.   Add Cymbalta 30mg for chronic pain.  Discussed importance of weight loss.           Relevant Medications    DULoxetine (CYMBALTA) 30 MG capsule    Chronic right shoulder pain    Relevant Orders    Ambulatory Referral to Orthopedic Surgery (Completed)        This visit has been rescheduled as a video visit to comply with patient safety concerns in accordance with CDC recommendations. Total time of discussion was 30 minutes.    Return in about 4 weeks (around 2/5/2021) for Recheck.    Plan of care reviewed with patient at the conclusion of today's visit. Education was provided regarding diagnosis, management, and any prescribed or recommended OTC medications.Patient verbalizes understanding of and agreement with management plan.       Patt Yoon PA-C    Please note that portions of this note were completed with a voice recognition program. Efforts were made to edit the dictations, but occasionally words are mistranscribed.

## 2021-01-10 VITALS — BODY MASS INDEX: 33.76 KG/M2 | WEIGHT: 230 LBS

## 2021-01-10 PROBLEM — R35.0 URINARY FREQUENCY: Status: ACTIVE | Noted: 2021-01-10

## 2021-01-10 PROBLEM — M25.511 CHRONIC RIGHT SHOULDER PAIN: Status: ACTIVE | Noted: 2021-01-10

## 2021-01-10 PROBLEM — G89.4 CHRONIC PAIN SYNDROME: Status: ACTIVE | Noted: 2021-01-10

## 2021-01-10 PROBLEM — G89.29 CHRONIC RIGHT SHOULDER PAIN: Status: ACTIVE | Noted: 2021-01-10

## 2021-02-05 ENCOUNTER — TELEMEDICINE (OUTPATIENT)
Dept: INTERNAL MEDICINE | Facility: CLINIC | Age: 71
End: 2021-02-05

## 2021-02-05 DIAGNOSIS — M25.511 CHRONIC RIGHT SHOULDER PAIN: ICD-10-CM

## 2021-02-05 DIAGNOSIS — R53.83 FATIGUE, UNSPECIFIED TYPE: ICD-10-CM

## 2021-02-05 DIAGNOSIS — G89.29 CHRONIC RIGHT SHOULDER PAIN: ICD-10-CM

## 2021-02-05 DIAGNOSIS — E78.2 MIXED HYPERLIPIDEMIA: ICD-10-CM

## 2021-02-05 DIAGNOSIS — G89.29 CHRONIC MIDLINE LOW BACK PAIN WITHOUT SCIATICA: ICD-10-CM

## 2021-02-05 DIAGNOSIS — M54.50 CHRONIC MIDLINE LOW BACK PAIN WITHOUT SCIATICA: ICD-10-CM

## 2021-02-05 DIAGNOSIS — M62.89 PELVIC FLOOR DYSFUNCTION: ICD-10-CM

## 2021-02-05 DIAGNOSIS — E55.9 VITAMIN D DEFICIENCY: Primary | ICD-10-CM

## 2021-02-05 DIAGNOSIS — E66.01 MORBIDLY OBESE (HCC): ICD-10-CM

## 2021-02-05 DIAGNOSIS — G89.4 CHRONIC PAIN SYNDROME: ICD-10-CM

## 2021-02-05 DIAGNOSIS — R73.09 ABNORMAL GLUCOSE: ICD-10-CM

## 2021-02-05 PROCEDURE — 99214 OFFICE O/P EST MOD 30 MIN: CPT | Performed by: PHYSICIAN ASSISTANT

## 2021-02-05 NOTE — PROGRESS NOTES
Patient Care Team:  Patt Yoon PA-C as PCP - General (Internal Medicine)  Bryn King MD as Consulting Physician (Gastroenterology)  Gilda Choi MD as Surgeon (Orthopedic Surgery)  Gerry Montero MD as Surgeon (Orthopedic Surgery)  Kartik Hamlin MD as Consulting Physician (Ophthalmology)  Braden Ndiaye MD as Consulting Physician (Urology)    Chief Complaint::   Chief Complaint   Patient presents with   • Pain   • Obesity         Subjective     HPI  70 year old female presents with followup on obesity, pelvic floor dysfunction with overactive bladder, and chronic pain syndrome. 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  Her job is sedentary. She has discontinued physical therapy due to COVID. She tries to do some of the exercises at home. She has not weighed, but feels as though she has gained weight.  Has not been watching her diet.    She had first COVID injection and was experiencing nausea. Received this several days ago.  She is seeing [facility] for right shoulder pain.  1/29/2021 saw Dr. Ford.  She is due for repeat MRI on right shoulder.   Ongoing urinary issues.  She has appt with Dr. Erickson on March 3rd.           The following portions of the patient's history were reviewed and updated as appropriate: active problem list, medication list, allergies, family history, social history    Review of Systems:   Review of Systems   Constitutional: Negative for activity change, appetite change, diaphoresis, fatigue, unexpected weight gain and unexpected weight loss.   HENT: Negative for hearing loss.    Eyes: Negative for visual disturbance.   Respiratory: Negative for chest tightness and shortness of breath.    Cardiovascular: Negative for chest pain, palpitations and leg swelling.   Gastrointestinal: Negative for abdominal pain, blood in stool, GERD and indigestion.   Endocrine: Negative for cold intolerance and heat intolerance.   Genitourinary:  Positive for urinary incontinence. Negative for dysuria and hematuria.   Musculoskeletal: Positive for arthralgias. Negative for myalgias.   Skin: Negative for skin lesions.   Neurological: Negative for tremors, seizures, syncope, speech difficulty, weakness, headache, memory problem and confusion.   Hematological: Does not bruise/bleed easily.   Psychiatric/Behavioral: Negative for sleep disturbance and depressed mood. The patient is not nervous/anxious.        Vital Signs  There were no vitals filed for this visit.  There is no height or weight on file to calculate BMI.    Labs  No visits with results within 3 Month(s) from this visit.   Latest known visit with results is:   Lab on 08/28/2020   Component Date Value Ref Range Status   • Glucose 08/28/2020 90  65 - 99 mg/dL Final   • BUN 08/28/2020 15  8 - 23 mg/dL Final   • Creatinine 08/28/2020 0.67  0.57 - 1.00 mg/dL Final   • Sodium 08/28/2020 139  136 - 145 mmol/L Final   • Potassium 08/28/2020 4.3  3.5 - 5.2 mmol/L Final   • Chloride 08/28/2020 101  98 - 107 mmol/L Final   • CO2 08/28/2020 26.1  22.0 - 29.0 mmol/L Final   • Calcium 08/28/2020 9.6  8.6 - 10.5 mg/dL Final   • Total Protein 08/28/2020 7.2  6.0 - 8.5 g/dL Final   • Albumin 08/28/2020 4.40  3.50 - 5.20 g/dL Final   • ALT (SGPT) 08/28/2020 23  1 - 33 U/L Final   • AST (SGOT) 08/28/2020 22  1 - 32 U/L Final   • Alkaline Phosphatase 08/28/2020 51  39 - 117 U/L Final   • Total Bilirubin 08/28/2020 0.5  0.0 - 1.2 mg/dL Final   • eGFR Non  Amer 08/28/2020 87  >60 mL/min/1.73 Final   • Globulin 08/28/2020 2.8  gm/dL Final   • A/G Ratio 08/28/2020 1.6  g/dL Final   • BUN/Creatinine Ratio 08/28/2020 22.4  7.0 - 25.0 Final   • Anion Gap 08/28/2020 11.9  5.0 - 15.0 mmol/L Final   • Total Cholesterol 08/28/2020 279* 0 - 200 mg/dL Final   • Triglycerides 08/28/2020 361* 0 - 150 mg/dL Final   • HDL Cholesterol 08/28/2020 46  40 - 60 mg/dL Final   • LDL Cholesterol  08/28/2020 161* 0 - 100 mg/dL Final   •  VLDL Cholesterol 08/28/2020 72.2* 5 - 40 mg/dL Final   • LDL/HDL Ratio 08/28/2020 3.50   Final   • Microalbumin, Urine 08/28/2020 <1.2  mg/dL Final   • 25 Hydroxy, Vitamin D 08/28/2020 31.4  30.0 - 100.0 ng/ml Final   • WBC 08/28/2020 4.71  3.40 - 10.80 10*3/mm3 Final   • RBC 08/28/2020 4.41  3.77 - 5.28 10*6/mm3 Final   • Hemoglobin 08/28/2020 14.9  12.0 - 15.9 g/dL Final   • Hematocrit 08/28/2020 42.7  34.0 - 46.6 % Final   • MCV 08/28/2020 96.8  79.0 - 97.0 fL Final   • MCH 08/28/2020 33.8* 26.6 - 33.0 pg Final   • MCHC 08/28/2020 34.9  31.5 - 35.7 g/dL Final   • RDW 08/28/2020 12.7  12.3 - 15.4 % Final   • RDW-SD 08/28/2020 45.5  37.0 - 54.0 fl Final   • MPV 08/28/2020 9.5  6.0 - 12.0 fL Final   • Platelets 08/28/2020 277  140 - 450 10*3/mm3 Final   • Neutrophil % 08/28/2020 48.4  42.7 - 76.0 % Final   • Lymphocyte % 08/28/2020 39.7  19.6 - 45.3 % Final   • Monocyte % 08/28/2020 9.8  5.0 - 12.0 % Final   • Eosinophil % 08/28/2020 1.3  0.3 - 6.2 % Final   • Basophil % 08/28/2020 0.6  0.0 - 1.5 % Final   • Immature Grans % 08/28/2020 0.2  0.0 - 0.5 % Final   • Neutrophils, Absolute 08/28/2020 2.28  1.70 - 7.00 10*3/mm3 Final   • Lymphocytes, Absolute 08/28/2020 1.87  0.70 - 3.10 10*3/mm3 Final   • Monocytes, Absolute 08/28/2020 0.46  0.10 - 0.90 10*3/mm3 Final   • Eosinophils, Absolute 08/28/2020 0.06  0.00 - 0.40 10*3/mm3 Final   • Basophils, Absolute 08/28/2020 0.03  0.00 - 0.20 10*3/mm3 Final   • Immature Grans, Absolute 08/28/2020 0.01  0.00 - 0.05 10*3/mm3 Final   • nRBC 08/28/2020 0.0  0.0 - 0.2 /100 WBC Final       Imaging  No radiology results for the last 30 days.      Current Outpatient Medications:   •  acetaminophen (TYLENOL) 650 MG 8 hr tablet, Take 650 mg by mouth 2 (Two) Times a Day. Takes 2 tablets twice, Disp: , Rfl:   •  Azelastine-Fluticasone 137-50 MCG/ACT suspension, 1 spray into the nostril(s) as directed by provider 2 (Two) Times a Day As Needed (allergies)., Disp: 1 bottle, Rfl: 5  •  B  Complex-Biotin-FA (SUPER B-COMPLEX) tablet, Take  by mouth., Disp: , Rfl:   •  baclofen (LIORESAL) 10 MG tablet, Take 1 tablet by mouth Daily. (Patient taking differently: Take 10 mg by mouth As Needed.), Disp: 90 tablet, Rfl: 0  •  cholecalciferol (VITAMIN D3) 25 MCG (1000 UT) tablet, Take 1,000 Units by mouth Daily., Disp: , Rfl:   •  Collagen 500 MG capsule, Take  by mouth Daily., Disp: , Rfl:   •  DEXILANT 60 MG capsule, Take 1 capsule by mouth Daily., Disp: , Rfl:   •  estradiol (VAGIFEM) 10 MCG tablet vaginal tablet, Insert 1 tablet into the vagina 2 (Two) Times a Week. Every 3 days, Disp: 24 tablet, Rfl: 3  •  fluticasone (FLONASE) 50 MCG/ACT nasal spray, 2 sprays into the nostril(s) as directed by provider Daily., Disp: 3 bottle, Rfl: 2  •  Garlic (GNP GARLIC EXTRACT) 400 MG tablet, Take 1,000 mg by mouth Daily., Disp: , Rfl:   •  Glucosamine-Chondroitin 250-200 MG tablet, Take  by mouth Daily., Disp: , Rfl:   •  Pediatric Multivit-Minerals-C (COMPLETE MULTI-VITAMIN PO), Take  by mouth., Disp: , Rfl:   •  traMADol (ULTRAM) 50 MG tablet, Take 50 mg by mouth As Needed., Disp: , Rfl:     Physical Exam:    Physical Exam  Vitals signs reviewed.   Constitutional:       Appearance: She is obese.   HENT:      Head: Normocephalic and atraumatic.      Nose: Nose normal.   Eyes:      Extraocular Movements: Extraocular movements intact.      Conjunctiva/sclera: Conjunctivae normal.   Neck:      Musculoskeletal: Normal range of motion and neck supple.   Neurological:      General: No focal deficit present.      Mental Status: She is alert and oriented to person, place, and time. Mental status is at baseline.   Psychiatric:         Mood and Affect: Mood normal.         Thought Content: Thought content normal.         Judgment: Judgment normal.         Procedures        Assessment/Plan   Problem List Items Addressed This Visit        Cardiac and Vasculature    Hyperlipidemia    Overview     Continue vascepa 1grm as  directed.  Continue to eat a low fat, low cholesterol diet.         Relevant Orders    Lipid Panel    MicroAlbumin, Urine, Random - Urine, Clean Catch       Endocrine and Metabolic    Vitamin D deficiency - Primary    Relevant Orders    Vitamin D 25 Hydroxy    Morbidly obese (CMS/HCC)    Overview     Body mass index is 33.76 kg/m². Discussed importance of weight loss.  Recommend low fat, low calorie diet.  Recommend daily cardiovascular exercise. Goal is to lose 5 lbs in one month.            Relevant Orders    CBC & Differential    Comprehensive Metabolic Panel    Hemoglobin A1c    TSH    T4, Free       Genitourinary and Reproductive     Pelvic floor dysfunction    Overview     With stress incontinence. Continue at home PT exercises for now.  She has had improvement.            Musculoskeletal and Injuries    Chronic midline low back pain without sciatica    Overview     Continue baclofen as needed.   Discussed importance of weight loss.           Relevant Orders    Ambulatory Referral to Neurosurgery (Completed)    Chronic right shoulder pain    Overview     Followup with Dr. Ford as directed.            Neuro    Chronic pain syndrome    Overview     Pt will wait to start Cymbalta.  She is encouraged to perform 20-30 minutes of cardiovascular exercise daily.            Other Visit Diagnoses     Abnormal glucose        Relevant Orders    Hemoglobin A1c    Fatigue, unspecified type        Relevant Orders    TSH    T4, Free          Return in about 3 months (around 5/5/2021) for Recheck.    Plan of care reviewed with patient at the conclusion of today's visit. Education was provided regarding diagnosis, management, and any prescribed or recommended OTC medications.Patient verbalizes understanding of and agreement with management plan.       Patt Yoon PA-C    Please note that portions of this note were completed with a voice recognition program. Efforts were made to edit the dictations, but  occasionally words are mistranscribed.

## 2021-02-08 DIAGNOSIS — M54.2 NECK PAIN: Primary | ICD-10-CM

## 2021-02-12 DIAGNOSIS — G89.29 CHRONIC MIDLINE LOW BACK PAIN WITHOUT SCIATICA: Primary | ICD-10-CM

## 2021-02-12 DIAGNOSIS — M54.50 CHRONIC MIDLINE LOW BACK PAIN WITHOUT SCIATICA: Primary | ICD-10-CM

## 2021-02-18 ENCOUNTER — TELEPHONE (OUTPATIENT)
Dept: INTERNAL MEDICINE | Facility: CLINIC | Age: 71
End: 2021-02-18

## 2021-02-18 NOTE — TELEPHONE ENCOUNTER
2/8/21  C-Spine MRI ordered.    referrals has communication in chart patient sad her pain was in the l-spine area.    2/12/21 L-Spine MRI ordered.  Communication in chart from referrals patient would rather have this test with her ortho doctor.,      Can both orders be canceled?

## 2021-03-01 ENCOUNTER — TELEPHONE (OUTPATIENT)
Dept: INTERNAL MEDICINE | Facility: CLINIC | Age: 71
End: 2021-03-01

## 2021-03-01 NOTE — TELEPHONE ENCOUNTER
Patient verbally understands that she should try  [facility] at  for weekly Covid Testing

## 2021-03-01 NOTE — TELEPHONE ENCOUNTER
I would not be able to order this.  We check for COVID in symptomatic patients or patients who have had exposure.  If you are wanting testing once weekly as a precaution, I would consider looking into [facility] at  [employer].  This would be your best bet.

## 2021-03-01 NOTE — TELEPHONE ENCOUNTER
Patient requested a call back. 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 Patient would like to know if she could get an order to be tested for COVID -19 once a week. Patient stated she has no symptoms but is concerned about her exposure due to visiting her patients homes. Patient would like this order to be mailed to her.    Please call and advise. Patient call back [phone]

## 2021-03-30 ENCOUNTER — TELEPHONE (OUTPATIENT)
Dept: INTERNAL MEDICINE | Facility: CLINIC | Age: 71
End: 2021-03-30

## 2021-03-30 ENCOUNTER — LAB (OUTPATIENT)
Dept: LAB | Facility: HOSPITAL | Age: 71
End: 2021-03-30

## 2021-03-30 ENCOUNTER — OFFICE VISIT (OUTPATIENT)
Dept: INTERNAL MEDICINE | Facility: CLINIC | Age: 71
End: 2021-03-30

## 2021-03-30 VITALS
TEMPERATURE: 97.5 F | SYSTOLIC BLOOD PRESSURE: 154 MMHG | HEART RATE: 80 BPM | BODY MASS INDEX: 37.18 KG/M2 | DIASTOLIC BLOOD PRESSURE: 88 MMHG | HEIGHT: 69 IN | WEIGHT: 251 LBS

## 2021-03-30 DIAGNOSIS — R73.09 ABNORMAL GLUCOSE: ICD-10-CM

## 2021-03-30 DIAGNOSIS — R03.0 ELEVATED BP WITHOUT DIAGNOSIS OF HYPERTENSION: ICD-10-CM

## 2021-03-30 DIAGNOSIS — R00.2 PALPITATIONS: ICD-10-CM

## 2021-03-30 DIAGNOSIS — R06.02 SOB (SHORTNESS OF BREATH): ICD-10-CM

## 2021-03-30 DIAGNOSIS — R03.0 ELEVATED BP WITHOUT DIAGNOSIS OF HYPERTENSION: Primary | ICD-10-CM

## 2021-03-30 DIAGNOSIS — E78.2 MIXED HYPERLIPIDEMIA: ICD-10-CM

## 2021-03-30 DIAGNOSIS — E66.01 MORBIDLY OBESE (HCC): ICD-10-CM

## 2021-03-30 DIAGNOSIS — E55.9 VITAMIN D DEFICIENCY: ICD-10-CM

## 2021-03-30 DIAGNOSIS — R53.83 FATIGUE, UNSPECIFIED TYPE: ICD-10-CM

## 2021-03-30 LAB
ALBUMIN SERPL-MCNC: 4.5 G/DL (ref 3.5–5.2)
ALBUMIN/GLOB SERPL: 1.9 G/DL
ALP SERPL-CCNC: 54 U/L (ref 39–117)
ALT SERPL W P-5'-P-CCNC: 26 U/L (ref 1–33)
ANION GAP SERPL CALCULATED.3IONS-SCNC: 9.5 MMOL/L (ref 5–15)
AST SERPL-CCNC: 29 U/L (ref 1–32)
BASOPHILS # BLD AUTO: 0.02 10*3/MM3 (ref 0–0.2)
BASOPHILS NFR BLD AUTO: 0.4 % (ref 0–1.5)
BILIRUB SERPL-MCNC: 0.3 MG/DL (ref 0–1.2)
BUN SERPL-MCNC: 13 MG/DL (ref 8–23)
BUN/CREAT SERPL: 19.1 (ref 7–25)
CALCIUM SPEC-SCNC: 9.2 MG/DL (ref 8.6–10.5)
CHLORIDE SERPL-SCNC: 104 MMOL/L (ref 98–107)
CHOLEST SERPL-MCNC: 243 MG/DL (ref 0–200)
CO2 SERPL-SCNC: 26.5 MMOL/L (ref 22–29)
CREAT SERPL-MCNC: 0.68 MG/DL (ref 0.57–1)
DEPRECATED RDW RBC AUTO: 46.3 FL (ref 37–54)
EOSINOPHIL # BLD AUTO: 0.05 10*3/MM3 (ref 0–0.4)
EOSINOPHIL NFR BLD AUTO: 1 % (ref 0.3–6.2)
ERYTHROCYTE [DISTWIDTH] IN BLOOD BY AUTOMATED COUNT: 12.8 % (ref 12.3–15.4)
GFR SERPL CREATININE-BSD FRML MDRD: 85 ML/MIN/1.73
GLOBULIN UR ELPH-MCNC: 2.4 GM/DL
GLUCOSE SERPL-MCNC: 88 MG/DL (ref 65–99)
HBA1C MFR BLD: 5.64 % (ref 4.8–5.6)
HCT VFR BLD AUTO: 45.2 % (ref 34–46.6)
HDLC SERPL-MCNC: 42 MG/DL (ref 40–60)
HGB BLD-MCNC: 15.4 G/DL (ref 12–15.9)
IMM GRANULOCYTES # BLD AUTO: 0.02 10*3/MM3 (ref 0–0.05)
IMM GRANULOCYTES NFR BLD AUTO: 0.4 % (ref 0–0.5)
LDLC SERPL CALC-MCNC: 129 MG/DL (ref 0–100)
LDLC/HDLC SERPL: 2.87 {RATIO}
LYMPHOCYTES # BLD AUTO: 1.53 10*3/MM3 (ref 0.7–3.1)
LYMPHOCYTES NFR BLD AUTO: 31.9 % (ref 19.6–45.3)
MCH RBC QN AUTO: 33.5 PG (ref 26.6–33)
MCHC RBC AUTO-ENTMCNC: 34.1 G/DL (ref 31.5–35.7)
MCV RBC AUTO: 98.3 FL (ref 79–97)
MONOCYTES # BLD AUTO: 0.6 10*3/MM3 (ref 0.1–0.9)
MONOCYTES NFR BLD AUTO: 12.5 % (ref 5–12)
NEUTROPHILS NFR BLD AUTO: 2.57 10*3/MM3 (ref 1.7–7)
NEUTROPHILS NFR BLD AUTO: 53.8 % (ref 42.7–76)
NRBC BLD AUTO-RTO: 0 /100 WBC (ref 0–0.2)
PLATELET # BLD AUTO: 266 10*3/MM3 (ref 140–450)
PMV BLD AUTO: 9.6 FL (ref 6–12)
POTASSIUM SERPL-SCNC: 4.5 MMOL/L (ref 3.5–5.2)
PROT SERPL-MCNC: 6.9 G/DL (ref 6–8.5)
RBC # BLD AUTO: 4.6 10*6/MM3 (ref 3.77–5.28)
SODIUM SERPL-SCNC: 140 MMOL/L (ref 136–145)
TRIGL SERPL-MCNC: 402 MG/DL (ref 0–150)
VLDLC SERPL-MCNC: 72 MG/DL (ref 5–40)
WBC # BLD AUTO: 4.79 10*3/MM3 (ref 3.4–10.8)

## 2021-03-30 PROCEDURE — 85025 COMPLETE CBC W/AUTO DIFF WBC: CPT

## 2021-03-30 PROCEDURE — 99214 OFFICE O/P EST MOD 30 MIN: CPT | Performed by: NURSE PRACTITIONER

## 2021-03-30 PROCEDURE — 82306 VITAMIN D 25 HYDROXY: CPT

## 2021-03-30 PROCEDURE — 84439 ASSAY OF FREE THYROXINE: CPT

## 2021-03-30 PROCEDURE — 80061 LIPID PANEL: CPT

## 2021-03-30 PROCEDURE — 82043 UR ALBUMIN QUANTITATIVE: CPT

## 2021-03-30 PROCEDURE — 93000 ELECTROCARDIOGRAM COMPLETE: CPT | Performed by: NURSE PRACTITIONER

## 2021-03-30 PROCEDURE — 83036 HEMOGLOBIN GLYCOSYLATED A1C: CPT

## 2021-03-30 PROCEDURE — 83880 ASSAY OF NATRIURETIC PEPTIDE: CPT

## 2021-03-30 PROCEDURE — 84443 ASSAY THYROID STIM HORMONE: CPT

## 2021-03-30 PROCEDURE — 80053 COMPREHEN METABOLIC PANEL: CPT

## 2021-03-30 RX ORDER — HYDROCHLOROTHIAZIDE 12.5 MG/1
12.5 TABLET ORAL DAILY
Qty: 30 TABLET | Refills: 2 | Status: SHIPPED | OUTPATIENT
Start: 2021-03-30 | End: 2021-06-04 | Stop reason: SINTOL

## 2021-03-30 RX ORDER — HYDROCHLOROTHIAZIDE 12.5 MG/1
12.5 TABLET ORAL DAILY
Qty: 30 TABLET | Refills: 2 | Status: SHIPPED | OUTPATIENT
Start: 2021-03-30 | End: 2021-03-30

## 2021-03-30 RX ORDER — MULTIPLE VITAMINS W/ MINERALS TAB 9MG-400MCG
1 TAB ORAL DAILY
COMMUNITY

## 2021-03-30 NOTE — PATIENT INSTRUCTIONS
Start hctz 12.5mg every morning.  Check BP at home and keep a log for your next visit.    •In general, adults should engage in aerobic physical activity 3-4 times a week with each session lasting an average of 40 minutes.  Goal for 150 minutes per week total.  •Moderate (brisk walking or jogging) to vigorous (running or biking) physical activity is recommended.  •There are many helpful strategies for heart-healthy eating, including the DASH diet and the [employer] Choose My Plate.   •Patients with high blood pressure should consume no more than 2,400 mg of sodium a day, ideally reducing sodium intake to 1,500 mg a day. However, even reducing sodium intake in one's current diet by 1,000 mg each day can help lower blood pressure.    Limit alcohol consumption.    Information obtained from the American College of Cardiology and American Heart Association.    
62.1

## 2021-03-30 NOTE — PROGRESS NOTES
"Keisha Malagon  1950  [id number]  Patient Care Team:  Patt Yoon PA-C as PCP - General (Internal Medicine)  Bryn King MD as Consulting Physician (Gastroenterology)  Gilda Choi MD as Surgeon (Orthopedic Surgery)  Gerry Montero MD as Surgeon (Orthopedic Surgery)  Kartik Hamlin MD as Consulting Physician (Ophthalmology)  Braden Ndiaye MD as Consulting Physician (Urology)    Keisha Malagon is a pleasant 70 y.o. female who presents for evaluation of Shortness of Breath (x 2 weeks), Joint Swelling, and Palpitations    Chief Complaint   Patient presents with   • Shortness of Breath     x 2 weeks   • Joint Swelling   • Palpitations       HPI:   Leg swelling, palpitations, chest tightness:  Saw urology for urinary incont and got pesery 3/3/21.  Has had some discomfort and was given estradiol cream daily a few days later, was already on vagifem every three days.  Then started noticing heart palpitations, anxiousness, chest tightness so after about 5 days she stopped estradiol.  Sx seemed to improve some.  Then flew to [place] last Thurdsay to see grandchild for the first time.   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 Good precautions and wearing N95.  Ate differently while there, takeout mostly, high sodium foods which she does not usually do.  While away developed bilat leg swelling L>R.  (Old ankle fx that side).   Has had heart papitations and swelling in past with inc sodium intake.  No bp meds in past. Returned home yest and swelling some better today. Has also thought this \"might be anxiety\".    Up 9 lbs since Sept  Past Medical History:   Diagnosis Date   • Allergic    • Breast injury 11/2016    pt feel and injured left breast, bruise has cleared, but still feels a lump in that area   • DDD (degenerative disc disease), lumbar    • GERD (gastroesophageal reflux disease)    • Irritable bowel " syndrome    • Kidney stone      Past Surgical History:   Procedure Laterality Date   • COLONOSCOPY     • HEMORRHOIDECTOMY     • HYSTERECTOMY     • OOPHORECTOMY     • ORIF ANKLE FRACTURE     • ROTATOR CUFF REPAIR Bilateral    • TONSILLECTOMY     • UVULOPALATOPLASTY       Family History   Problem Relation Age of Onset   • Ovarian cancer Maternal Grandmother 67   • Arthritis Mother    • Osteoporosis Mother    • Sjogren's syndrome Mother    • Lung disease Mother         ILD due to sjogrens   • Stroke Father    • No Known Problems Son    • No Known Problems Daughter    • Breast cancer Neg Hx      Social History     Tobacco Use   Smoking Status Former Smoker   • Packs/day: 0.25   • Years: 1.00   • Pack years: 0.25   • Types: Cigarettes   • Quit date:    • Years since quittin.2   Smokeless Tobacco Never Used   Tobacco Comment    4 cigarettes a week senior year of highschool     Allergies   Allergen Reactions   • Levaquin [Levofloxacin In D5w] Other (See Comments)     Muscle weakness and soreness    • Bactrim [Sulfamethoxazole-Trimethoprim] Hives   • Levofloxacin Other (See Comments)     unknown   • Sulfamethoxazole Other (See Comments)     unknown   • Trimethoprim Other (See Comments)     unknown       Current Outpatient Medications:   •  acetaminophen (TYLENOL) 650 MG 8 hr tablet, Take 1,300 mg by mouth 2 (Two) Times a Day., Disp: , Rfl:   •  B Complex-Biotin-FA (SUPER B-COMPLEX) tablet, Take 1 tablet by mouth Daily., Disp: , Rfl:   •  baclofen (LIORESAL) 10 MG tablet, Take 1 tablet by mouth Daily. (Patient taking differently: Take 10 mg by mouth Daily As Needed.), Disp: 90 tablet, Rfl: 0  •  cholecalciferol (VITAMIN D3) 25 MCG (1000 UT) tablet, Take 1,000 Units by mouth Daily., Disp: , Rfl:   •  Collagen 500 MG capsule, Take 1 capsule by mouth Daily., Disp: , Rfl:   •  DEXILANT 60 MG capsule, Take 1 capsule by mouth Daily., Disp: , Rfl:   •  estradiol (VAGIFEM) 10 MCG tablet vaginal tablet, Insert 1 tablet into  "the vagina 2 (Two) Times a Week. Every 3 days, Disp: 24 tablet, Rfl: 3  •  fluticasone (FLONASE) 50 MCG/ACT nasal spray, 2 sprays into the nostril(s) as directed by provider Daily. (Patient taking differently: 1 spray into the nostril(s) as directed by provider 2 (two) times a day.), Disp: 3 bottle, Rfl: 2  •  Glucosamine-Chondroitin 250-200 MG tablet, Take 1 tablet by mouth Daily., Disp: , Rfl:   •  multivitamin with minerals (MULTIVITAMIN WOMEN 50+ PO), Take 1 tablet by mouth Daily., Disp: , Rfl:   •  Nutritional Supplements (ADULT NUTRITIONAL SUPPLEMENT PO), Essential oil Doteria colon digest zin: 1 capsule daily, Disp: , Rfl:   •  traMADol (ULTRAM) 50 MG tablet, Take 50 mg by mouth Daily As Needed., Disp: , Rfl:   •  hydroCHLOROthiazide (HYDRODIURIL) 12.5 MG tablet, Take 1 tablet by mouth Daily., Disp: 30 tablet, Rfl: 2    Review of Systems   Constitutional: Negative for chills, fatigue and fever.   HENT: Positive for congestion. Negative for ear pain and sinus pressure.    Respiratory: Positive for cough, chest tightness and shortness of breath. Negative for wheezing.    Cardiovascular: Positive for palpitations. Negative for chest pain.   Gastrointestinal: Negative for abdominal pain, blood in stool and constipation.   Skin: Negative for color change.   Allergic/Immunologic: Positive for environmental allergies.   Neurological: Negative for dizziness, speech difficulty and headache.   Psychiatric/Behavioral: Negative for decreased concentration. The patient is nervous/anxious.      /88   Pulse 80   Temp 97.5 °F (36.4 °C) (Temporal)   Ht 175.8 cm (69.21\")   Wt 114 kg (251 lb)   BMI 36.84 kg/m²     Physical Exam  Constitutional:       Appearance: She is well-developed.   HENT:      Head: Normocephalic and atraumatic.      Comments: *wearing mask  Eyes:      Conjunctiva/sclera: Conjunctivae normal.      Pupils: Pupils are equal, round, and reactive to light.   Cardiovascular:      Rate and Rhythm: " Normal rate and regular rhythm.      Pulses: Normal pulses.      Heart sounds: Normal heart sounds.   Pulmonary:      Effort: Pulmonary effort is normal.      Breath sounds: Normal breath sounds.   Musculoskeletal:         General: Normal range of motion.      Cervical back: Normal range of motion and neck supple.      Right lower le+ Pitting Edema present.      Left lower le+ Pitting Edema present.   Skin:     General: Skin is warm and dry.   Neurological:      Mental Status: She is alert and oriented to person, place, and time.   Psychiatric:         Mood and Affect: Mood normal.         Behavior: Behavior normal.         Thought Content: Thought content normal.         Judgment: Judgment normal.           ECG 12 Lead    Date/Time: 3/30/2021 11:53 AM  Performed by: Sherlyn Cabello APRN  Authorized by: Sherlyn Cabello APRN   Comparison: not compared with previous ECG   Previous ECG: no previous ECG available  Rhythm: sinus rhythm  Rate: normal  BPM: 65  QRS axis: normal    Clinical impression: normal ECG            Results Review:  I reviewed the patient's new clinical results.    PHQ-9 Total Score:      Assessment/Plan:  Diagnoses and all orders for this visit:    1. Elevated BP without diagnosis of hypertension (Primary)  Comments:  Start hydrochlorothiazide 12.5 mg every morning.  Take first dose this afternoon.  EKG normal today.  Labs today  Orders:  -     CBC & Differential; Future  -     Comprehensive Metabolic Panel; Future    2. SOB (shortness of breath)  Comments:  Call if new or worse symptoms  Orders:  -     ECG 12 Lead  -     BNP; Future    3. Palpitations  Comments:  Normal EKG today  Orders:  -     TSH Rfx On Abnormal To Free T4; Future    Other orders  -     hydroCHLOROthiazide (HYDRODIURIL) 12.5 MG tablet; Take 1 tablet by mouth Daily.  Dispense: 30 tablet; Refill: 2       Patient Instructions   Start hctz 12.5mg every morning.  Check BP at home and keep a log for your next visit.     •In general, adults should engage in aerobic physical activity 3-4 times a week with each session lasting an average of 40 minutes.  Goal for 150 minutes per week total.  •Moderate (brisk walking or jogging) to vigorous (running or biking) physical activity is recommended.  •There are many helpful strategies for heart-healthy eating, including the DASH diet and the [employer] Choose My Plate.   •Patients with high blood pressure should consume no more than 2,400 mg of sodium a day, ideally reducing sodium intake to 1,500 mg a day. However, even reducing sodium intake in one's current diet by 1,000 mg each day can help lower blood pressure.    Limit alcohol consumption.    Information obtained from the American College of Cardiology and American Heart Association.      Plan of care reviewed with patient at the conclusion of today's visit. Education was provided regarding diagnosis, management and any prescribed or recommended OTC medications.  Patient verbalizes understanding of and agreement with management plan.    Return in about 2 weeks (around 4/13/2021) for Recheck bp.    *Note that portions of this note were completed with a voice recognition program.  Efforts were made to edit the dictation but occasionally words are transcribed.    CIARA Hernandez

## 2021-03-30 NOTE — TELEPHONE ENCOUNTER
Provider: BREEZY  Caller: ROLAND RAMIREZ  Relationship to Patient: SELF  Pharmacy: [facility] - [address] - [phone]  - [phone]   [phone]  Phone Number: [phone]  Reason for Call: SWELLING IN LEGS, AND INTERMITTENT CHEST PAINS, SHORTNESS OF BREATH  When was the patient last seen: 2/5/21  When did it start: TWO WEEKS AGO  Where is it located:   Characteristics of symptom/severity:   Timing- Is it constant or intermittent: INTERMITTENT  What makes it worse: STANDING   What makes it better: LAYING DOWN  What therapies/medications have you tried:       PATIENT STATED SHE WAS PRESCRIBED estradiol (VAGIFEM) 10 MCG tablet vaginal tablet AND STATED THAT SWELLING IN LEGS, AND INTERMITTENT CHEST PAINS.  PATIENT STATED SHE HAS SINCE STOPPED USING THE HORMONE CREAM, BUT CONTINUES TO HAVE SYMPTOMS.    DOCTOR ARMAND TIJERINA DOES NOT HAVE ANY AVAILABILITY UNTIL 4/9.  PATIENT PREFERS TO SEE DOCTOR TIJERINA.    PATIENT DECLINED GOING TO THE ER, OR CALLING [phone].  UNABLE TO WARM TRANSFER TO CLINIC.    PLEASE ADVISE:   [phone]

## 2021-03-30 NOTE — TELEPHONE ENCOUNTER
PATIENT CALLED BACK AND STATED SHE WANTED HER RX FROM TODAY TO BE SENT TO [facility].  SHE CANCELLED THE ORDER TO [facility].

## 2021-03-30 NOTE — TELEPHONE ENCOUNTER
Patient would like Hydrochlorothiazide 12.5 mg  sent over to [facility] [address] she [facility]

## 2021-03-31 DIAGNOSIS — E78.2 MIXED HYPERLIPIDEMIA: Primary | ICD-10-CM

## 2021-03-31 LAB
25(OH)D3 SERPL-MCNC: 36.1 NG/ML (ref 30–100)
ALBUMIN UR-MCNC: <1.2 MG/DL
NT-PROBNP SERPL-MCNC: 44.1 PG/ML (ref 0–900)
T4 FREE SERPL-MCNC: 1.12 NG/DL (ref 0.93–1.7)
TSH SERPL DL<=0.05 MIU/L-ACNC: 1.13 UIU/ML (ref 0.27–4.2)

## 2021-04-05 ENCOUNTER — OFFICE VISIT (OUTPATIENT)
Dept: INTERNAL MEDICINE | Facility: CLINIC | Age: 71
End: 2021-04-05

## 2021-04-05 VITALS
DIASTOLIC BLOOD PRESSURE: 88 MMHG | SYSTOLIC BLOOD PRESSURE: 142 MMHG | TEMPERATURE: 97.3 F | HEART RATE: 80 BPM | WEIGHT: 247 LBS | BODY MASS INDEX: 36.58 KG/M2 | HEIGHT: 69 IN

## 2021-04-05 DIAGNOSIS — I10 ESSENTIAL HYPERTENSION: Primary | ICD-10-CM

## 2021-04-05 PROCEDURE — 99213 OFFICE O/P EST LOW 20 MIN: CPT | Performed by: NURSE PRACTITIONER

## 2021-04-05 NOTE — PROGRESS NOTES
Keisha Malagon  1950  [id number]  Patient Care Team:  Patt Yoon PA-C as PCP - General (Internal Medicine)  Bryn King MD as Consulting Physician (Gastroenterology)  Gilda Choi MD as Surgeon (Orthopedic Surgery)  Gerry Montero MD as Surgeon (Orthopedic Surgery)  Kartik Hamlin MD as Consulting Physician (Ophthalmology)  Braden Ndiaye MD as Consulting Physician (Urology)    Keisha Malagon is a pleasant 70 y.o. female who presents for evaluation of Hypertension    Chief Complaint   Patient presents with   • Hypertension       HPI:   Started hctz 12.5, less leg swelling, bp improving, home readings systolic 153 and 141.  She is watching sodium closely now.  No side effects on hctz so far.    We discussed labs, not fasting and lipids were drawn.  Will discuss with PCP at upcoming visit on 21.She is on B12 supplement.  Past Medical History:   Diagnosis Date   • Allergic    • Breast injury 2016    pt feel and injured left breast, bruise has cleared, but still feels a lump in that area   • DDD (degenerative disc disease), lumbar    • GERD (gastroesophageal reflux disease)    • Irritable bowel syndrome    • Kidney stone      Past Surgical History:   Procedure Laterality Date   • COLONOSCOPY     • HEMORRHOIDECTOMY     • HYSTERECTOMY     • OOPHORECTOMY     • ORIF ANKLE FRACTURE     • ROTATOR CUFF REPAIR Bilateral    • TONSILLECTOMY     • UVULOPALATOPLASTY       Family History   Problem Relation Age of Onset   • Ovarian cancer Maternal Grandmother 67   • Arthritis Mother    • Osteoporosis Mother    • Sjogren's syndrome Mother    • Lung disease Mother         ILD due to sjogrens   • Stroke Father    • No Known Problems Son    • No Known Problems Daughter    • Breast cancer Neg Hx      Social History     Tobacco Use   Smoking Status Former Smoker   • Packs/day: 0.25   • Years: 1.00   • Pack years: 0.25   • Types: Cigarettes   • Quit date:    • Years since quittin.2    Smokeless Tobacco Never Used   Tobacco Comment    4 cigarettes a week senior year of highschool     Allergies   Allergen Reactions   • Levaquin [Levofloxacin In D5w] Other (See Comments)     Muscle weakness and soreness    • Bactrim [Sulfamethoxazole-Trimethoprim] Hives   • Levofloxacin Other (See Comments)     unknown   • Sulfamethoxazole Other (See Comments)     unknown   • Trimethoprim Other (See Comments)     unknown       Current Outpatient Medications:   •  acetaminophen (TYLENOL) 650 MG 8 hr tablet, Take 1,300 mg by mouth 2 (Two) Times a Day., Disp: , Rfl:   •  B Complex-Biotin-FA (SUPER B-COMPLEX) tablet, Take 1 tablet by mouth Daily., Disp: , Rfl:   •  baclofen (LIORESAL) 10 MG tablet, Take 1 tablet by mouth Daily. (Patient taking differently: Take 10 mg by mouth Daily As Needed.), Disp: 90 tablet, Rfl: 0  •  cholecalciferol (VITAMIN D3) 25 MCG (1000 UT) tablet, Take 1,000 Units by mouth Daily., Disp: , Rfl:   •  Collagen 500 MG capsule, Take 1 capsule by mouth Daily., Disp: , Rfl:   •  DEXILANT 60 MG capsule, Take 1 capsule by mouth Daily., Disp: , Rfl:   •  estradiol (VAGIFEM) 10 MCG tablet vaginal tablet, Insert 1 tablet into the vagina 2 (Two) Times a Week. Every 3 days, Disp: 24 tablet, Rfl: 3  •  fluticasone (FLONASE) 50 MCG/ACT nasal spray, 2 sprays into the nostril(s) as directed by provider Daily. (Patient taking differently: 1 spray into the nostril(s) as directed by provider 2 (two) times a day.), Disp: 3 bottle, Rfl: 2  •  Glucosamine-Chondroitin 250-200 MG tablet, Take 1 tablet by mouth Daily., Disp: , Rfl:   •  hydroCHLOROthiazide (HYDRODIURIL) 12.5 MG tablet, Take 1 tablet by mouth Daily., Disp: 30 tablet, Rfl: 2  •  multivitamin with minerals (MULTIVITAMIN WOMEN 50+ PO), Take 1 tablet by mouth Daily., Disp: , Rfl:   •  Nutritional Supplements (ADULT NUTRITIONAL SUPPLEMENT PO), Essential oil Doteria colon digest zin: 1 capsule daily, Disp: , Rfl:   •  traMADol (ULTRAM) 50 MG tablet, Take 50  "mg by mouth Daily As Needed., Disp: , Rfl:     Review of Systems   Constitutional: Negative for chills, fatigue and fever.   HENT: Negative for congestion, ear pain and sinus pressure.    Respiratory: Positive for chest tightness. Negative for cough, shortness of breath and wheezing.    Cardiovascular: Positive for chest pain and palpitations.   Gastrointestinal: Negative for abdominal pain, blood in stool and constipation.   Skin: Negative for color change.   Allergic/Immunologic: Negative for environmental allergies.   Neurological: Negative for dizziness, speech difficulty and headache.   Psychiatric/Behavioral: Negative for decreased concentration. The patient is not nervous/anxious.      /88   Pulse 80   Temp 97.3 °F (36.3 °C) (Temporal)   Ht 175.8 cm (69.21\")   Wt 112 kg (247 lb)   BMI 36.25 kg/m²     Physical Exam  Constitutional:       Appearance: She is well-developed.   HENT:      Head: Normocephalic and atraumatic.      Comments: *wearing mask  Eyes:      Conjunctiva/sclera: Conjunctivae normal.      Pupils: Pupils are equal, round, and reactive to light.   Cardiovascular:      Rate and Rhythm: Normal rate and regular rhythm.      Pulses: Normal pulses.      Heart sounds: Normal heart sounds.   Pulmonary:      Effort: Pulmonary effort is normal.      Breath sounds: Normal breath sounds.   Musculoskeletal:         General: Normal range of motion.      Cervical back: Normal range of motion and neck supple.      Right lower leg: No edema.      Left lower leg: No edema.   Skin:     General: Skin is warm and dry.   Neurological:      Mental Status: She is alert and oriented to person, place, and time.   Psychiatric:         Mood and Affect: Mood normal.         Behavior: Behavior normal.         Thought Content: Thought content normal.         Judgment: Judgment normal.         Procedures    Results Review:  I reviewed the patient's new clinical results.    PHQ-9 Total Score:  "     Assessment/Plan:  Diagnoses and all orders for this visit:    1. Essential hypertension (Primary)  Comments:  continue hctz 12.5 mg, keep f/u with PCP this mo       Patient Instructions   Continue medications as prescribed.  Check BP at home and keep a log for your next visit.    •In general, adults should engage in aerobic physical activity 3-4 times a week with each session lasting an average of 40 minutes.  Goal for 150 minutes per week total.  •Moderate (brisk walking or jogging) to vigorous (running or biking) physical activity is recommended.  •There are many helpful strategies for heart-healthy eating, including the DASH diet and the [employer]'s Choose My Plate.   •Patients with high blood pressure should consume no more than 2,400 mg of sodium a day, ideally reducing sodium intake to 1,500 mg a day. However, even reducing sodium intake in one's current diet by 1,000 mg each day can help lower blood pressure.    Limit alcohol consumption.    Information obtained from the American College of Cardiology and American Heart Association.      Plan of care reviewed with patient at the conclusion of today's visit. Education was provided regarding diagnosis, management and any prescribed or recommended OTC medications.  Patient verbalizes understanding of and agreement with management plan.    Return for Next scheduled follow up.    *Note that portions of this note were completed with a voice recognition program.  Efforts were made to edit the dictation but occasionally words are transcribed.    CIARA Hernandez

## 2021-04-05 NOTE — PATIENT INSTRUCTIONS
Continue medications as prescribed.  Check BP at home and keep a log for your next visit.    •In general, adults should engage in aerobic physical activity 3-4 times a week with each session lasting an average of 40 minutes.  Goal for 150 minutes per week total.  •Moderate (brisk walking or jogging) to vigorous (running or biking) physical activity is recommended.  •There are many helpful strategies for heart-healthy eating, including the DASH diet and the [employer]'s Choose My Plate.   •Patients with high blood pressure should consume no more than 2,400 mg of sodium a day, ideally reducing sodium intake to 1,500 mg a day. However, even reducing sodium intake in one's current diet by 1,000 mg each day can help lower blood pressure.    Limit alcohol consumption.    Information obtained from the American College of Cardiology and American Heart Association.

## 2021-04-12 ENCOUNTER — OFFICE VISIT (OUTPATIENT)
Dept: INTERNAL MEDICINE | Facility: CLINIC | Age: 71
End: 2021-04-12

## 2021-04-12 ENCOUNTER — HOSPITAL ENCOUNTER (OUTPATIENT)
Dept: GENERAL RADIOLOGY | Facility: HOSPITAL | Age: 71
Discharge: HOME | End: 2021-04-12
Admitting: PHYSICIAN ASSISTANT

## 2021-04-12 VITALS
HEIGHT: 69 IN | SYSTOLIC BLOOD PRESSURE: 138 MMHG | OXYGEN SATURATION: 99 % | TEMPERATURE: 97.5 F | DIASTOLIC BLOOD PRESSURE: 84 MMHG | HEART RATE: 76 BPM | WEIGHT: 245 LBS | BODY MASS INDEX: 36.29 KG/M2

## 2021-04-12 DIAGNOSIS — R06.09 DYSPNEA ON EXERTION: ICD-10-CM

## 2021-04-12 DIAGNOSIS — E78.5 HYPERLIPIDEMIA, UNSPECIFIED HYPERLIPIDEMIA TYPE: ICD-10-CM

## 2021-04-12 DIAGNOSIS — R06.09 DYSPNEA ON EXERTION: Primary | ICD-10-CM

## 2021-04-12 DIAGNOSIS — E66.9 OBESITY (BMI 30-39.9): ICD-10-CM

## 2021-04-12 DIAGNOSIS — I10 ESSENTIAL HYPERTENSION: ICD-10-CM

## 2021-04-12 PROCEDURE — 71046 X-RAY EXAM CHEST 2 VIEWS: CPT

## 2021-04-12 PROCEDURE — 99214 OFFICE O/P EST MOD 30 MIN: CPT | Performed by: PHYSICIAN ASSISTANT

## 2021-04-12 NOTE — PROGRESS NOTES
Patient Care Team:  Patt Yoon PA-C as PCP - General (Internal Medicine)  Bryn King MD as Consulting Physician (Gastroenterology)  Gidla Choi MD as Surgeon (Orthopedic Surgery)  Gerry Montero MD as Surgeon (Orthopedic Surgery)  Kartik Hamlin MD as Consulting Physician (Ophthalmology)  Braden Ndiaye MD as Consulting Physician (Urology)    Chief Complaint::   Chief Complaint   Patient presents with   • Hypertension     f/u         Subjective     HPI  Keisha is a 70 year old female with history of hyperlipidemia, obesity, and overactive bladder, who presents for follow-up of hypertension and chest pain.  She was seen in the office on 3/30/21 for shortness of breath and chest pain. An EKG was performed, which was normal and she was placed on HCTZ 25mg.  She recently went on a trip to [facility] and had noticed increased shortness of breath, chest tightness, and palpitations.  She reports shortness of breath with light activity or exertion.  There is chest tightness.  Also seeing urology for pelvic floor dysfunction and overactive bladder.  Recently had pessary fitted. Patient started using estrogen cream shortly before shortness of breath and chest pain developed.  Since then, she immediately stopped the estrogen cream and removed the pessary.  She has not had significant weight change over the past year.  She has become less active over the past year due to Covid.        The following portions of the patient's history were reviewed and updated as appropriate: active problem list, medication list, allergies, family history, social history    Review of Systems:   Review of Systems   Constitutional: Positive for activity change. Negative for appetite change, diaphoresis, fatigue, unexpected weight gain and unexpected weight loss.   HENT: Negative for hearing loss.    Eyes: Negative for visual disturbance.   Respiratory: Positive for shortness of breath. Negative for cough and  "chest tightness.    Cardiovascular: Positive for chest pain and palpitations. Negative for leg swelling.   Gastrointestinal: Negative for abdominal pain, blood in stool, GERD and indigestion.   Endocrine: Negative for cold intolerance and heat intolerance.   Genitourinary: Positive for frequency. Negative for dysuria and hematuria.   Musculoskeletal: Negative for arthralgias and myalgias.   Skin: Negative for skin lesions.   Neurological: Negative for tremors, seizures, syncope, speech difficulty, weakness, headache, memory problem and confusion.   Hematological: Does not bruise/bleed easily.   Psychiatric/Behavioral: Negative for sleep disturbance and depressed mood. The patient is not nervous/anxious.        Vital Signs  Vitals:    04/12/21 1012 04/12/21 1105   BP: 122/89 138/84   BP Location: Left arm    Patient Position: Sitting    Cuff Size: Adult    Pulse: 76    Temp: 97.5 °F (36.4 °C)    TempSrc: Temporal    SpO2: 99%    Weight: 111 kg (245 lb)    Height: 175.8 cm (69.21\")    PainSc: 0-No pain      Body mass index is 35.96 kg/m².    Labs  Lab on 03/30/2021   Component Date Value Ref Range Status   • Glucose 03/30/2021 88  65 - 99 mg/dL Final   • BUN 03/30/2021 13  8 - 23 mg/dL Final   • Creatinine 03/30/2021 0.68  0.57 - 1.00 mg/dL Final   • Sodium 03/30/2021 140  136 - 145 mmol/L Final   • Potassium 03/30/2021 4.5  3.5 - 5.2 mmol/L Final   • Chloride 03/30/2021 104  98 - 107 mmol/L Final   • CO2 03/30/2021 26.5  22.0 - 29.0 mmol/L Final   • Calcium 03/30/2021 9.2  8.6 - 10.5 mg/dL Final   • Total Protein 03/30/2021 6.9  6.0 - 8.5 g/dL Final   • Albumin 03/30/2021 4.50  3.50 - 5.20 g/dL Final   • ALT (SGPT) 03/30/2021 26  1 - 33 U/L Final   • AST (SGOT) 03/30/2021 29  1 - 32 U/L Final   • Alkaline Phosphatase 03/30/2021 54  39 - 117 U/L Final   • Total Bilirubin 03/30/2021 0.3  0.0 - 1.2 mg/dL Final   • eGFR Non African Amer 03/30/2021 85  >60 mL/min/1.73 Final   • Globulin 03/30/2021 2.4  gm/dL Final   • A/G " Ratio 03/30/2021 1.9  g/dL Final   • BUN/Creatinine Ratio 03/30/2021 19.1  7.0 - 25.0 Final   • Anion Gap 03/30/2021 9.5  5.0 - 15.0 mmol/L Final   • Hemoglobin A1C 03/30/2021 5.64* 4.80 - 5.60 % Final   • Total Cholesterol 03/30/2021 243* 0 - 200 mg/dL Final   • Triglycerides 03/30/2021 402* 0 - 150 mg/dL Final   • HDL Cholesterol 03/30/2021 42  40 - 60 mg/dL Final   • LDL Cholesterol  03/30/2021 129* 0 - 100 mg/dL Final   • VLDL Cholesterol 03/30/2021 72* 5 - 40 mg/dL Final   • LDL/HDL Ratio 03/30/2021 2.87   Final   • Microalbumin, Urine 03/30/2021 <1.2  mg/dL Final   • Free T4 03/30/2021 1.12  0.93 - 1.70 ng/dL Final   • 25 Hydroxy, Vitamin D 03/30/2021 36.1  30.0 - 100.0 ng/ml Final   • proBNP 03/30/2021 44.1  0.0 - 900.0 pg/mL Final   • TSH 03/30/2021 1.130  0.270 - 4.200 uIU/mL Final   • WBC 03/30/2021 4.79  3.40 - 10.80 10*3/mm3 Final   • RBC 03/30/2021 4.60  3.77 - 5.28 10*6/mm3 Final   • Hemoglobin 03/30/2021 15.4  12.0 - 15.9 g/dL Final   • Hematocrit 03/30/2021 45.2  34.0 - 46.6 % Final   • MCV 03/30/2021 98.3* 79.0 - 97.0 fL Final   • MCH 03/30/2021 33.5* 26.6 - 33.0 pg Final   • MCHC 03/30/2021 34.1  31.5 - 35.7 g/dL Final   • RDW 03/30/2021 12.8  12.3 - 15.4 % Final   • RDW-SD 03/30/2021 46.3  37.0 - 54.0 fl Final   • MPV 03/30/2021 9.6  6.0 - 12.0 fL Final   • Platelets 03/30/2021 266  140 - 450 10*3/mm3 Final   • Neutrophil % 03/30/2021 53.8  42.7 - 76.0 % Final   • Lymphocyte % 03/30/2021 31.9  19.6 - 45.3 % Final   • Monocyte % 03/30/2021 12.5* 5.0 - 12.0 % Final   • Eosinophil % 03/30/2021 1.0  0.3 - 6.2 % Final   • Basophil % 03/30/2021 0.4  0.0 - 1.5 % Final   • Immature Grans % 03/30/2021 0.4  0.0 - 0.5 % Final   • Neutrophils, Absolute 03/30/2021 2.57  1.70 - 7.00 10*3/mm3 Final   • Lymphocytes, Absolute 03/30/2021 1.53  0.70 - 3.10 10*3/mm3 Final   • Monocytes, Absolute 03/30/2021 0.60  0.10 - 0.90 10*3/mm3 Final   • Eosinophils, Absolute 03/30/2021 0.05  0.00 - 0.40 10*3/mm3 Final   •  Basophils, Absolute 03/30/2021 0.02  0.00 - 0.20 10*3/mm3 Final   • Immature Grans, Absolute 03/30/2021 0.02  0.00 - 0.05 10*3/mm3 Final   • nRBC 03/30/2021 0.0  0.0 - 0.2 /100 WBC Final       Imaging  XR Chest PA & Lateral    Result Date: 4/13/2021  Chronic changes without acute cardiopulmonary process.  D:  04/12/2021 E:  04/12/2021  This report was finalized on 4/13/2021 9:32 AM by Dr. Omar Peralta.          Current Outpatient Medications:   •  acetaminophen (TYLENOL) 650 MG 8 hr tablet, Take 1,300 mg by mouth 2 (Two) Times a Day., Disp: , Rfl:   •  B Complex-Biotin-FA (SUPER B-COMPLEX) tablet, Take 1 tablet by mouth Daily., Disp: , Rfl:   •  baclofen (LIORESAL) 10 MG tablet, Take 1 tablet by mouth Daily. (Patient taking differently: Take 10 mg by mouth Daily As Needed.), Disp: 90 tablet, Rfl: 0  •  Collagen 500 MG capsule, Take 1 capsule by mouth Daily., Disp: , Rfl:   •  DEXILANT 60 MG capsule, Take 1 capsule by mouth Daily., Disp: , Rfl:   •  fluticasone (FLONASE) 50 MCG/ACT nasal spray, 2 sprays into the nostril(s) as directed by provider Daily. (Patient taking differently: 1 spray into the nostril(s) as directed by provider 2 (two) times a day.), Disp: 3 bottle, Rfl: 2  •  Glucosamine-Chondroitin 250-200 MG tablet, Take 1 tablet by mouth Daily., Disp: , Rfl:   •  hydroCHLOROthiazide (HYDRODIURIL) 12.5 MG tablet, Take 1 tablet by mouth Daily., Disp: 30 tablet, Rfl: 2  •  multivitamin with minerals (MULTIVITAMIN WOMEN 50+ PO), Take 1 tablet by mouth Daily., Disp: , Rfl:   •  Nutritional Supplements (ADULT NUTRITIONAL SUPPLEMENT PO), Essential oil Doteria colon digest zin: 1 capsule daily, Disp: , Rfl:   •  traMADol (ULTRAM) 50 MG tablet, Take 50 mg by mouth Daily As Needed., Disp: , Rfl:   •  cholecalciferol (VITAMIN D3) 25 MCG (1000 UT) tablet, Take 1,000 Units by mouth Daily., Disp: , Rfl:   •  estradiol (VAGIFEM) 10 MCG tablet vaginal tablet, Insert 1 tablet into the vagina 2 (Two) Times a Week. Every 3  days, Disp: 24 tablet, Rfl: 3    Physical Exam:    Physical Exam  Vitals and nursing note reviewed.   Constitutional:       Appearance: Normal appearance. She is well-developed.   HENT:      Head: Normocephalic and atraumatic.      Right Ear: Hearing, tympanic membrane, ear canal and external ear normal.      Left Ear: Hearing, tympanic membrane, ear canal and external ear normal.      Nose: Nose normal.      Mouth/Throat:      Pharynx: Uvula midline.   Eyes:      General: Lids are normal.      Conjunctiva/sclera: Conjunctivae normal.      Pupils: Pupils are equal, round, and reactive to light.   Cardiovascular:      Rate and Rhythm: Normal rate and regular rhythm.      Pulses: Normal pulses.      Heart sounds: Normal heart sounds.   Pulmonary:      Effort: Pulmonary effort is normal.      Breath sounds: Normal breath sounds.   Abdominal:      General: Bowel sounds are normal.      Palpations: Abdomen is soft.   Musculoskeletal:         General: Normal range of motion.      Cervical back: Full passive range of motion without pain, normal range of motion and neck supple.   Skin:     General: Skin is warm and dry.   Neurological:      Mental Status: She is alert and oriented to person, place, and time.      Deep Tendon Reflexes: Reflexes are normal and symmetric.   Psychiatric:         Speech: Speech normal.         Behavior: Behavior normal.         Thought Content: Thought content normal.         Judgment: Judgment normal.         Procedures        Assessment/Plan   Problem List Items Addressed This Visit        Cardiac and Vasculature    Hyperlipidemia    Overview     Continue vascepa 1grm as directed.  Continue to eat a low fat, low cholesterol diet.         Dyspnea on exertion - Primary    Relevant Orders    XR Chest PA & Lateral (Completed)    Ambulatory Referral to [facility]    Essential hypertension    Overview     Continue hydrochlorothiazide 25 mg daily.  Consider adding  lisinopril, pending cardiology work-up.         Relevant Medications    hydroCHLOROthiazide (HYDRODIURIL) 12.5 MG tablet       Endocrine and Metabolic    Obesity (BMI 30-39.9)    Overview     Body mass index is 35.96 kg/m². Discussed importance of weight loss.  Recommend low fat, low calorie diet. Goal is to lose 3 lbs in one month.                  Return in about 3 weeks (around 5/3/2021) for Recheck.    Plan of care reviewed with patient at the conclusion of today's visit. Education was provided regarding diagnosis, management, and any prescribed or recommended OTC medications.Patient verbalizes understanding of and agreement with management plan.       Patt Yoon PA-C    Please note that portions of this note were completed with a voice recognition program. Efforts were made to edit the dictations, but occasionally words are mistranscribed.

## 2021-04-13 PROBLEM — I10 ESSENTIAL HYPERTENSION: Status: ACTIVE | Noted: 2021-04-13

## 2021-04-13 PROBLEM — R06.09 DYSPNEA ON EXERTION: Status: ACTIVE | Noted: 2021-04-13

## 2021-04-16 ENCOUNTER — OFFICE VISIT (OUTPATIENT)
Dept: CARDIOLOGY | Facility: HOSPITAL | Age: 71
End: 2021-04-16

## 2021-04-16 VITALS
HEART RATE: 86 BPM | HEIGHT: 69 IN | OXYGEN SATURATION: 96 % | TEMPERATURE: 97.9 F | DIASTOLIC BLOOD PRESSURE: 85 MMHG | BODY MASS INDEX: 36.05 KG/M2 | RESPIRATION RATE: 20 BRPM | SYSTOLIC BLOOD PRESSURE: 136 MMHG | WEIGHT: 243.38 LBS

## 2021-04-16 DIAGNOSIS — E66.9 OBESITY (BMI 30-39.9): ICD-10-CM

## 2021-04-16 DIAGNOSIS — E78.5 HYPERLIPIDEMIA, UNSPECIFIED HYPERLIPIDEMIA TYPE: ICD-10-CM

## 2021-04-16 DIAGNOSIS — R06.09 DYSPNEA ON EXERTION: Primary | ICD-10-CM

## 2021-04-16 DIAGNOSIS — I10 ESSENTIAL HYPERTENSION: ICD-10-CM

## 2021-04-16 PROCEDURE — 99214 OFFICE O/P EST MOD 30 MIN: CPT | Performed by: NURSE PRACTITIONER

## 2021-04-16 NOTE — PROGRESS NOTES
"Chief Complaint  Establish Care and Shortness of Breath    Subjective    History of Present Illness {CC  Problem List  Visit  Diagnosis   Encounters  Notes  Medications  Labs  Result Review Imaging  Media :23}       History of Present Illness   70 year old female presents to the office today for ongoing evaluation of her dyspnea on exertion. Patient reports that AARON has been occurring intermittently for the past few months. Patient also reports intermittent chest pain.  Chest pain and shortness of breath occur with light activity or exertion.  She believes a lot of her shortness of breath was related to her swelling caused by excessive sodium intake.  She reports improvement with her dyspnea as well as pedal edema since initiation of HCTZ.  Patient does note ongoing fatigue.  Currently denies presyncope syncope, orthopnea, PND or pedal edema currently.  Objective     Vital Signs:   Vitals:    04/16/21 1138 04/16/21 1139 04/16/21 1142   BP: 133/78 142/85 136/85   BP Location: Right arm Left arm Left arm   Patient Position: Sitting Standing Sitting   Cuff Size: Large Adult Large Adult Large Adult   Pulse: 85 91 86   Resp:   20   Temp:   97.9 °F (36.6 °C)   TempSrc:   Temporal   SpO2: 96% 96% 96%   Weight:   110 kg (243 lb 6 oz)   Height:   175.8 cm (69.21\")     Body mass index is 35.72 kg/m².  Physical Exam  Vitals and nursing note reviewed.   Constitutional:       Appearance: Normal appearance.   HENT:      Head: Normocephalic.   Eyes:      Pupils: Pupils are equal, round, and reactive to light.   Cardiovascular:      Rate and Rhythm: Normal rate and regular rhythm.      Pulses: Normal pulses.      Heart sounds: Normal heart sounds. No murmur heard.     Pulmonary:      Effort: Pulmonary effort is normal.      Breath sounds: Normal breath sounds.   Abdominal:      General: Bowel sounds are normal.      Palpations: Abdomen is soft.   Musculoskeletal:         General: Normal range of motion.      Cervical " back: Normal range of motion.      Right lower leg: No edema.      Left lower leg: No edema.   Skin:     General: Skin is warm and dry.      Capillary Refill: Capillary refill takes less than 2 seconds.   Neurological:      Mental Status: She is alert and oriented to person, place, and time.   Psychiatric:         Mood and Affect: Mood normal.         Thought Content: Thought content normal.              Result Review  Data Reviewed:{ Labs  Result Review  Imaging  Med Tab  Media :23}   TSH Rfx On Abnormal To Free T4 (03/30/2021 12:46)  BNP (03/30/2021 12:46)  Vitamin D 25 Hydroxy (03/30/2021 12:46)  T4, Free (03/30/2021 12:46)  MicroAlbumin, Urine, Random - Urine, Clean Catch (03/30/2021 12:46)  Lipid Panel (03/30/2021 12:46)  Hemoglobin A1c (03/30/2021 12:46)  Comprehensive Metabolic Panel (03/30/2021 12:46)  CBC & Differential (03/30/2021 12:46)         Assessment and Plan {CC Problem List  Visit Diagnosis  ROS  Review (Popup)  Health Maintenance  Quality  BestPractice  Medications  SmartSets  SnapShot Encounters  Media :23}   1. Dyspnea on exertion    - Adult Transthoracic Echo Complete W/ Cont if Necessary Per Protocol; Future  - Stress Test With Myocardial Perfusion (1 Day); Future    2. Hyperlipidemia, unspecified hyperlipidemia type  Diet controlled  - Stress Test With Myocardial Perfusion (1 Day); Future  - Ambulatory Referral to Nutrition Services    3. Essential hypertension   well controlled on HCTZ   - Stress Test With Myocardial Perfusion (1 Day); Future  - Ambulatory Referral to Nutrition Services    4. Obesity (BMI 30-39.9)    - Ambulatory Referral to Nutrition Services          Follow Up {Instructions Charge Capture  Follow-up Communications :23}   No follow-ups on file.    Patient was given instructions and counseling regarding her condition or for health maintenance advice. Please see specific information pulled into the AVS if appropriate.  Patient was instructed to call the  [facility] with any questions, concerns, or worsening symptoms.    *Please note that portions of this note were completed with a voice recognition program. Efforts were made to edit the dictations, but occasionally words are mistranscribed.

## 2021-04-21 ENCOUNTER — TELEPHONE (OUTPATIENT)
Dept: INTERNAL MEDICINE | Facility: CLINIC | Age: 71
End: 2021-04-21

## 2021-04-21 NOTE — TELEPHONE ENCOUNTER
Called to discuss with patient. Her BP is not running high. She though it was. Today 128/84. Yesterday 124/86. I told her the goal is to be below 130/90 and to call us if it raises or her symptoms worsen or develop new symptoms. She stated her swelling has improved after taking HCTZ. She mentioned she worked outside yesterday and had to take a break and stated her heart was pounding. She still has dyspnea w/ exertion but that is no different from when she was seen by Patt 04/12. She stated she is feeling better and doing ok. She saw cardiology 04/16. Has an echo and stress test scheduled on 05/07/21 and appt with Patt to follow up on results 05/14/21.

## 2021-05-02 ENCOUNTER — APPOINTMENT (OUTPATIENT)
Dept: GENERAL RADIOLOGY | Facility: HOSPITAL | Age: 71
End: 2021-05-02

## 2021-05-02 ENCOUNTER — HOSPITAL ENCOUNTER (EMERGENCY)
Facility: HOSPITAL | Age: 71
Discharge: HOME | End: 2021-05-02
Attending: EMERGENCY MEDICINE | Admitting: EMERGENCY MEDICINE

## 2021-05-02 VITALS
RESPIRATION RATE: 20 BRPM | OXYGEN SATURATION: 96 % | HEART RATE: 65 BPM | WEIGHT: 235 LBS | HEIGHT: 69 IN | BODY MASS INDEX: 34.8 KG/M2 | DIASTOLIC BLOOD PRESSURE: 76 MMHG | TEMPERATURE: 97.8 F | SYSTOLIC BLOOD PRESSURE: 130 MMHG

## 2021-05-02 DIAGNOSIS — Z86.79 HISTORY OF HYPERTENSION: ICD-10-CM

## 2021-05-02 DIAGNOSIS — R00.2 PALPITATIONS: Primary | ICD-10-CM

## 2021-05-02 LAB
ALBUMIN SERPL-MCNC: 4.6 G/DL (ref 3.5–5.2)
ALBUMIN/GLOB SERPL: 1.6 G/DL
ALP SERPL-CCNC: 69 U/L (ref 39–117)
ALT SERPL W P-5'-P-CCNC: 29 U/L (ref 1–33)
ANION GAP SERPL CALCULATED.3IONS-SCNC: 12 MMOL/L (ref 5–15)
AST SERPL-CCNC: 42 U/L (ref 1–32)
BASOPHILS # BLD AUTO: 0.04 10*3/MM3 (ref 0–0.2)
BASOPHILS NFR BLD AUTO: 0.4 % (ref 0–1.5)
BILIRUB SERPL-MCNC: 0.3 MG/DL (ref 0–1.2)
BUN SERPL-MCNC: 30 MG/DL (ref 8–23)
BUN/CREAT SERPL: 29.4 (ref 7–25)
CALCIUM SPEC-SCNC: 9.5 MG/DL (ref 8.6–10.5)
CHLORIDE SERPL-SCNC: 103 MMOL/L (ref 98–107)
CO2 SERPL-SCNC: 24 MMOL/L (ref 22–29)
CREAT SERPL-MCNC: 1.02 MG/DL (ref 0.57–1)
DEPRECATED RDW RBC AUTO: 43.7 FL (ref 37–54)
EOSINOPHIL # BLD AUTO: 0.04 10*3/MM3 (ref 0–0.4)
EOSINOPHIL NFR BLD AUTO: 0.4 % (ref 0.3–6.2)
ERYTHROCYTE [DISTWIDTH] IN BLOOD BY AUTOMATED COUNT: 12.1 % (ref 12.3–15.4)
GFR SERPL CREATININE-BSD FRML MDRD: 53 ML/MIN/1.73
GLOBULIN UR ELPH-MCNC: 2.9 GM/DL
GLUCOSE SERPL-MCNC: 106 MG/DL (ref 65–99)
HCT VFR BLD AUTO: 47 % (ref 34–46.6)
HGB BLD-MCNC: 15.8 G/DL (ref 12–15.9)
HOLD SPECIMEN: NORMAL
IMM GRANULOCYTES # BLD AUTO: 0.04 10*3/MM3 (ref 0–0.05)
IMM GRANULOCYTES NFR BLD AUTO: 0.4 % (ref 0–0.5)
LIPASE SERPL-CCNC: 40 U/L (ref 13–60)
LYMPHOCYTES # BLD AUTO: 2.29 10*3/MM3 (ref 0.7–3.1)
LYMPHOCYTES NFR BLD AUTO: 25.5 % (ref 19.6–45.3)
MCH RBC QN AUTO: 32.8 PG (ref 26.6–33)
MCHC RBC AUTO-ENTMCNC: 33.6 G/DL (ref 31.5–35.7)
MCV RBC AUTO: 97.5 FL (ref 79–97)
MONOCYTES # BLD AUTO: 0.86 10*3/MM3 (ref 0.1–0.9)
MONOCYTES NFR BLD AUTO: 9.6 % (ref 5–12)
NEUTROPHILS NFR BLD AUTO: 5.7 10*3/MM3 (ref 1.7–7)
NEUTROPHILS NFR BLD AUTO: 63.7 % (ref 42.7–76)
NRBC BLD AUTO-RTO: 0 /100 WBC (ref 0–0.2)
NT-PROBNP SERPL-MCNC: 69.6 PG/ML (ref 0–900)
PLATELET # BLD AUTO: 272 10*3/MM3 (ref 140–450)
PMV BLD AUTO: 9.3 FL (ref 6–12)
POTASSIUM SERPL-SCNC: 3.9 MMOL/L (ref 3.5–5.2)
PROT SERPL-MCNC: 7.5 G/DL (ref 6–8.5)
QT INTERVAL: 366 MS
QT INTERVAL: 396 MS
QTC INTERVAL: 430 MS
QTC INTERVAL: 450 MS
RBC # BLD AUTO: 4.82 10*6/MM3 (ref 3.77–5.28)
SODIUM SERPL-SCNC: 139 MMOL/L (ref 136–145)
TROPONIN T SERPL-MCNC: <0.01 NG/ML (ref 0–0.03)
TROPONIN T SERPL-MCNC: <0.01 NG/ML (ref 0–0.03)
WBC # BLD AUTO: 8.97 10*3/MM3 (ref 3.4–10.8)
WHOLE BLOOD HOLD SPECIMEN: NORMAL
WHOLE BLOOD HOLD SPECIMEN: NORMAL

## 2021-05-02 PROCEDURE — 93005 ELECTROCARDIOGRAM TRACING: CPT | Performed by: EMERGENCY MEDICINE

## 2021-05-02 PROCEDURE — 84484 ASSAY OF TROPONIN QUANT: CPT

## 2021-05-02 PROCEDURE — 71045 X-RAY EXAM CHEST 1 VIEW: CPT

## 2021-05-02 PROCEDURE — 85025 COMPLETE CBC W/AUTO DIFF WBC: CPT

## 2021-05-02 PROCEDURE — 83880 ASSAY OF NATRIURETIC PEPTIDE: CPT

## 2021-05-02 PROCEDURE — 84484 ASSAY OF TROPONIN QUANT: CPT | Performed by: EMERGENCY MEDICINE

## 2021-05-02 PROCEDURE — 80053 COMPREHEN METABOLIC PANEL: CPT

## 2021-05-02 PROCEDURE — 83690 ASSAY OF LIPASE: CPT

## 2021-05-02 PROCEDURE — 99284 EMERGENCY DEPT VISIT MOD MDM: CPT

## 2021-05-02 PROCEDURE — 93005 ELECTROCARDIOGRAM TRACING: CPT

## 2021-05-02 RX ORDER — ASPIRIN 81 MG/1
324 TABLET, CHEWABLE ORAL ONCE
Status: DISCONTINUED | OUTPATIENT
Start: 2021-05-02 | End: 2021-05-02 | Stop reason: HOSPADM

## 2021-05-02 RX ORDER — SODIUM CHLORIDE 0.9 % (FLUSH) 0.9 %
10 SYRINGE (ML) INJECTION AS NEEDED
Status: DISCONTINUED | OUTPATIENT
Start: 2021-05-02 | End: 2021-05-02 | Stop reason: HOSPADM

## 2021-05-02 NOTE — ED PROVIDER NOTES
"    EMERGENCY DEPARTMENT ENCOUNTER      Pt Name: Keisha Malagon  MRN: [id number]  YOB: 1950  Date of evaluation: 5/2/2021  Provider: Chilo Torres MD    CHIEF COMPLAINT       Chief Complaint   Patient presents with   • Chest Pain         HISTORY OF PRESENT ILLNESS  (Location/Symptom, Timing/Onset, Context/Setting, Quality, Duration, Modifying Factors, Severity.)   Keisha Malagon is a 70 y.o. female who presents to the emergency department with intermittent palpitations over the course the past 4 weeks.  Patient states that they primarily occur in the evening and she has a \"skipped beat\" in her heart.  This is nonsustained and last for a period of about 3 to 5 seconds.  She denies to me any associated chest pain or discomfort, shortness of breath, diaphoresis, nausea, or vomiting and has no prior history of cardiac disease.  She does take medication for hypertension.  She has no prior cardiac history or history of cardiac dysrhythmia.  She states that when this occurs the symptoms are mild in severity and did not seem to have any modifying factors associated with them.  She is asymptomatic at the time of my evaluation.      Nursing notes were reviewed.    REVIEW OF SYSTEMS    (2-9 systems for level 4, 10 or more for level 5)   ROS:  General:  No fevers, no chills, no weakness  Cardiovascular:  Palpitations  Respiratory:  No shortness of breath, no cough, no wheezing  Gastrointestinal:  No pain, no nausea, no vomiting, no diarrhea  Musculoskeletal:  No muscle pain, no joint pain  Skin:  No rash, no easy bruising  Neurologic:  No speech problems, no headache, no extremity numbness, no extremity tingling, no extremity weakness  Psychiatric:  No anxiety  Genitourinary:  No dysuria, no hematuria    Except as noted above the remainder of the review of systems was reviewed and negative.       PAST MEDICAL HISTORY     Past Medical History:   Diagnosis Date   • Allergic    • Breast injury 11/2016    pt feel and " injured left breast, bruise has cleared, but still feels a lump in that area   • DDD (degenerative disc disease), lumbar    • GERD (gastroesophageal reflux disease)    • Irritable bowel syndrome    • Kidney stone          SURGICAL HISTORY       Past Surgical History:   Procedure Laterality Date   • COLONOSCOPY     • HEMORRHOIDECTOMY     • HYSTERECTOMY     • OOPHORECTOMY     • ORIF ANKLE FRACTURE     • ROTATOR CUFF REPAIR Bilateral    • TONSILLECTOMY     • UVULOPALATOPLASTY           CURRENT MEDICATIONS       Current Facility-Administered Medications:   •  aspirin chewable tablet 324 mg, 324 mg, Oral, Once, Chilo Torres MD  •  sodium chloride 0.9 % flush 10 mL, 10 mL, Intravenous, PRN, Chilo Torres MD    Current Outpatient Medications:   •  acetaminophen (TYLENOL) 650 MG 8 hr tablet, Take 1,300 mg by mouth 2 (Two) Times a Day., Disp: , Rfl:   •  B Complex-Biotin-FA (SUPER B-COMPLEX) tablet, Take 1 tablet by mouth Daily., Disp: , Rfl:   •  baclofen (LIORESAL) 10 MG tablet, Take 1 tablet by mouth Daily. (Patient taking differently: Take 10 mg by mouth Daily As Needed.), Disp: 90 tablet, Rfl: 0  •  Collagen 500 MG capsule, Take 1 capsule by mouth Daily., Disp: , Rfl:   •  DEXILANT 60 MG capsule, Take 1 capsule by mouth Daily., Disp: , Rfl:   •  fluticasone (FLONASE) 50 MCG/ACT nasal spray, 2 sprays into the nostril(s) as directed by provider Daily. (Patient taking differently: 1 spray into the nostril(s) as directed by provider 2 (two) times a day.), Disp: 3 bottle, Rfl: 2  •  Glucosamine-Chondroitin 250-200 MG tablet, Take 1 tablet by mouth Daily., Disp: , Rfl:   •  hydroCHLOROthiazide (HYDRODIURIL) 12.5 MG tablet, Take 1 tablet by mouth Daily., Disp: 30 tablet, Rfl: 2  •  multivitamin with minerals (MULTIVITAMIN WOMEN 50+ PO), Take 1 tablet by mouth Daily., Disp: , Rfl:   •  Nutritional Supplements (ADULT NUTRITIONAL SUPPLEMENT PO), Essential oil Doteria colon digest zin: 1 capsule daily, Disp: , Rfl:   •   traMADol (ULTRAM) 50 MG tablet, Take 50 mg by mouth Daily As Needed., Disp: , Rfl:     ALLERGIES     Levaquin [levofloxacin in d5w], Bactrim [sulfamethoxazole-trimethoprim], Levofloxacin, Sulfamethoxazole, and Trimethoprim    FAMILY HISTORY       Family History   Problem Relation Age of Onset   • Ovarian cancer Maternal Grandmother 67   • Arthritis Mother    • Osteoporosis Mother    • Sjogren's syndrome Mother    • Lung disease Mother         ILD due to sjogrens   • Stroke Father    • Coronary artery disease Father    • No Known Problems Son    • No Known Problems Daughter    • Breast cancer Neg Hx           SOCIAL HISTORY       Social History     Socioeconomic History   • Marital status: Single     Spouse name: Not on file   • Number of children: Not on file   • Years of education: Not on file   • Highest education level: Not on file   Tobacco Use   • Smoking status: Former Smoker     Packs/day: 0.25     Years: 1.00     Pack years: 0.25     Types: Cigarettes     Quit date:      Years since quittin.3   • Smokeless tobacco: Never Used   • Tobacco comment: 4 cigarettes a week senior year of highschool   Substance and Sexual Activity   • Alcohol use: No   • Drug use: Not Currently     Types: Marijuana     Comment: in high school   • Sexual activity: Defer         PHYSICAL EXAM    (up to 7 for level 4, 8 or more for level 5)     Vitals:    21 0315 21 0330 21 0345 21 0400   BP: 149/86 138/79 136/76 130/76   BP Location:       Patient Position:       Pulse: 72 73 65 65   Resp:       Temp:       TempSrc:       SpO2: 95% 94% 94% 96%   Weight:       Height:           Physical Exam  General: Awake, alert, no acute distress.  HEENT: Conjunctiva normal.  Neck: Trachea midline.  Cardiac: Heart regular rate, rhythm, no murmurs, rubs, or gallops  Lungs: Lungs are clear to auscultation, there is no wheezing, rhonchi, or rales. There is no use of accessory muscles.  Chest wall: There is no tenderness  to palpation over the chest wall or over ribs  Abdomen: Abdomen is soft, nontender, nondistended. There are no firm or pulsatile masses, no rebound rigidity or guarding.   Musculoskeletal: No deformity.  Neuro: Alert and oriented x 4.  Dermatology: Skin is warm and dry  Psych: Mentation is grossly normal, cognition is grossly normal. Affect is appropriate.        DIAGNOSTIC RESULTS     EKG: All EKG's are interpreted by the Emergency Department Physician who either signs or Co-signs this chart in the absence of a cardiologist.    ECG 1: Sinus rhythm rate of 91, no acute ST segment or T wave change, normal intervals, no ectopy    ECG 2: Sinus rhythm rate of 71, no acute ST segment or T wave change, normal intervals, ectopy    RADIOLOGY:   Non-plain film images such as CT, Ultrasound and MRI are read by the radiologist. Plain radiographic images are visualized and preliminarily interpreted by the emergency physician with the below findings:      [x] Radiologist's Report Reviewed:  XR Chest 1 View   Final Result   No acute cardiopulmonary findings.      Signer Name: Jalil Aguilera MD    Signed: 5/2/2021 12:53 AM    Workstation Name: [email or web address]     [facility]            LABS:    I have reviewed and interpreted all of the currently available lab results from this visit (if applicable):  Results for orders placed or performed during the hospital encounter of 05/02/21   Troponin    Specimen: Blood   Result Value Ref Range    Troponin T <0.010 0.000 - 0.030 ng/mL   Troponin    Specimen: Blood   Result Value Ref Range    Troponin T <0.010 0.000 - 0.030 ng/mL   Comprehensive Metabolic Panel    Specimen: Blood   Result Value Ref Range    Glucose 106 (H) 65 - 99 mg/dL    BUN 30 (H) 8 - 23 mg/dL    Creatinine 1.02 (H) 0.57 - 1.00 mg/dL    Sodium 139 136 - 145 mmol/L    Potassium 3.9 3.5 - 5.2 mmol/L    Chloride 103 98 - 107 mmol/L    CO2 24.0 22.0 - 29.0 mmol/L    Calcium 9.5 8.6 - 10.5 mg/dL    Total  Protein 7.5 6.0 - 8.5 g/dL    Albumin 4.60 3.50 - 5.20 g/dL    ALT (SGPT) 29 1 - 33 U/L    AST (SGOT) 42 (H) 1 - 32 U/L    Alkaline Phosphatase 69 39 - 117 U/L    Total Bilirubin 0.3 0.0 - 1.2 mg/dL    eGFR Non African Amer 53 (L) >60 mL/min/1.73    Globulin 2.9 gm/dL    A/G Ratio 1.6 g/dL    BUN/Creatinine Ratio 29.4 (H) 7.0 - 25.0    Anion Gap 12.0 5.0 - 15.0 mmol/L   Lipase    Specimen: Blood   Result Value Ref Range    Lipase 40 13 - 60 U/L   BNP    Specimen: Blood   Result Value Ref Range    proBNP 69.6 0.0 - 900.0 pg/mL   CBC Auto Differential    Specimen: Blood   Result Value Ref Range    WBC 8.97 3.40 - 10.80 10*3/mm3    RBC 4.82 3.77 - 5.28 10*6/mm3    Hemoglobin 15.8 12.0 - 15.9 g/dL    Hematocrit 47.0 (H) 34.0 - 46.6 %    MCV 97.5 (H) 79.0 - 97.0 fL    MCH 32.8 26.6 - 33.0 pg    MCHC 33.6 31.5 - 35.7 g/dL    RDW 12.1 (L) 12.3 - 15.4 %    RDW-SD 43.7 37.0 - 54.0 fl    MPV 9.3 6.0 - 12.0 fL    Platelets 272 140 - 450 10*3/mm3    Neutrophil % 63.7 42.7 - 76.0 %    Lymphocyte % 25.5 19.6 - 45.3 %    Monocyte % 9.6 5.0 - 12.0 %    Eosinophil % 0.4 0.3 - 6.2 %    Basophil % 0.4 0.0 - 1.5 %    Immature Grans % 0.4 0.0 - 0.5 %    Neutrophils, Absolute 5.70 1.70 - 7.00 10*3/mm3    Lymphocytes, Absolute 2.29 0.70 - 3.10 10*3/mm3    Monocytes, Absolute 0.86 0.10 - 0.90 10*3/mm3    Eosinophils, Absolute 0.04 0.00 - 0.40 10*3/mm3    Basophils, Absolute 0.04 0.00 - 0.20 10*3/mm3    Immature Grans, Absolute 0.04 0.00 - 0.05 10*3/mm3    nRBC 0.0 0.0 - 0.2 /100 WBC   ECG 12 Lead   Result Value Ref Range    QT Interval 366 ms    QTC Interval 450 ms   ECG 12 Lead   Result Value Ref Range    QT Interval 396 ms    QTC Interval 430 ms   Light Blue Top   Result Value Ref Range    Extra Tube hold for add-on    Green Top (Gel)   Result Value Ref Range    Extra Tube Hold for add-ons.    Lavender Top   Result Value Ref Range    Extra Tube hold for add-on    Gold Top - SST   Result Value Ref Range    Extra Tube Hold for add-ons.     De Luna Top   Result Value Ref Range    Extra Tube Hold for add-ons.         All other labs were within normal range or not returned as of this dictation.      EMERGENCY DEPARTMENT COURSE and DIFFERENTIAL DIAGNOSIS/MDM:   Vitals:    Vitals:    05/02/21 0315 05/02/21 0330 05/02/21 0345 05/02/21 0400   BP: 149/86 138/79 136/76 130/76   BP Location:       Patient Position:       Pulse: 72 73 65 65   Resp:       Temp:       TempSrc:       SpO2: 95% 94% 94% 96%   Weight:       Height:           ED Course as of May 02 0521   Sun May 02, 2021   0405 Patient is completely asymptomatic at this time. Discussed diagnostic results and close follow up with cardiology.    [NS]      ED Course User Index  [NS] Chilo Torres MD       Patient very well-appearing and completely asymptomatic throughout the duration of her stay in the emergency department.  She describes single skipped beats/palpitations that seem to be most consistent with PVCs and did not sound like any malignant dysrhythmia such as ventricular fibrillation or ventricular tachycardia.  The symptoms also do not seem consistent with atrial fibrillation.  She has no chest pain, shortness of breath, or other concerning symptoms associated with this and do not feel this represents ACS.  She has serially negative ECGs and troponins in the emergency department.  Her laboratory evaluation demonstrates no evidence of myocardial injury, significant anemia, or electrolyte derangement.  I feel that she is appropriate for discharge home with close follow-up in the dysrhythmia clinic.    I had a discussion with the patient/family regarding diagnosis, diagnostic results, treatment plan, and medications.  The patient/family indicated understanding of these instructions.  I spent adequate time at the bedside preceding discharge necessary to personally discuss the aftercare instructions, giving patient education, providing explanations of the results of our evaluations/findings,  and my decision making to assure that the patient/family understand the plan of care.  Time was allotted to answer questions at that time and throughout the ED course.  Emphasis was placed on timely follow-up after discharge.  I also discussed the potential for the development of an acute emergent condition requiring further evaluation, admission, or even surgical intervention. I discussed that we found nothing during the visit today indicating the need for further workup, admission, or the presence of an unstable medical condition.  I encouraged the patient to return to the emergency department immediately for ANY concerns, worsening, new complaints, or if symptoms persist and unable to seek follow-up in a timely fashion.  The patient/family expressed understanding and agreement with this plan.  The patient will follow-up with their PCP in 1-2 days for reevaluation.       MEDICATIONS ADMINISTERED IN ED:  Medications   sodium chloride 0.9 % flush 10 mL (has no administration in time range)   aspirin chewable tablet 324 mg (324 mg Oral Not Given 5/2/21 2468)         FINAL IMPRESSION      1. Palpitations    2. History of hypertension          DISPOSITION/PLAN     ED Disposition     ED Disposition Condition Comment    Discharge Stable           PATIENT REFERRED TO:  Patt Yoon PA-C  [address]  [place] [address]  [phone]    Schedule an appointment as soon as possible for a visit in 2 days      [facility] Emergency Department  [address] [address] [phone]    If symptoms worsen    [facility] CARDIOLOGY  [address]  [place] [id number] [phone]          DISCHARGE MEDICATIONS:     Medication List      CHANGE how you take these medications    baclofen 10 MG tablet  Commonly known as: LIORESAL  Take 1 tablet by mouth Daily.  What changed:   · when to take this  · reasons to take  this     fluticasone 50 MCG/ACT nasal spray  Commonly known as: FLONASE  2 sprays into the nostril(s) as directed by provider Daily.  What changed:   · how much to take  · when to take this        CONTINUE taking these medications    acetaminophen 650 MG 8 hr tablet  Commonly known as: TYLENOL     ADULT NUTRITIONAL SUPPLEMENT PO     Collagen 500 MG capsule     Dexilant 60 MG capsule  Generic drug: dexlansoprazole     Glucosamine-Chondroitin 250-200 MG tablet     hydroCHLOROthiazide 12.5 MG tablet  Commonly known as: HYDRODIURIL  Take 1 tablet by mouth Daily.     multivitamin with minerals tablet tablet     Super B-Complex tablet     traMADol 50 MG tablet  Commonly known as: ULTRAM                Comment: Please note this report has been produced using speech recognition software.      Chilo Torres MD  Attending Emergency Physician               Chilo Torres MD  05/02/21 9088

## 2021-05-03 ENCOUNTER — TELEPHONE (OUTPATIENT)
Dept: CARDIOLOGY | Facility: HOSPITAL | Age: 71
End: 2021-05-03

## 2021-05-03 NOTE — TELEPHONE ENCOUNTER
Spoke with patient who notes that she did vigorous yard work on Saturday and believes that she might have been dehydrated. Upcoming stress and echo on 5/7/2021. Patient notes that she is feeling better.

## 2021-05-03 NOTE — TELEPHONE ENCOUNTER
[facility] Telephone Note for:    Keisha Malagon, 1950  Home Phone [phone]   Mobile [phone]       Reason for Call:  ER visit yesterday     Symptoms:  Tachycardia, Palpitations     Onset::  Yesterday     Anything Tried?:  Went to ER    Other Pertinent Information (Weight, Vitals, etc.):  Patient called and stated yesterday she was out doing yard work all morning and later in the day started to experience tachycardia and palpitations. Patient stated she went to the ER and was told she was having PVC's. Patient stated she would like to discuss if her HCTZ if causing her to have palpitations or if she was possibly dehydrated. Patient stated she is doing much better today. Patient does not have a recent HR but her BP was 123/80.

## 2021-05-04 ENCOUNTER — APPOINTMENT (OUTPATIENT)
Dept: PREADMISSION TESTING | Facility: HOSPITAL | Age: 71
End: 2021-05-04

## 2021-05-04 LAB — SARS-COV-2 RNA PNL SPEC NAA+PROBE: NOT DETECTED

## 2021-05-04 PROCEDURE — U0004 COV-19 TEST NON-CDC HGH THRU: HCPCS

## 2021-05-04 PROCEDURE — C9803 HOPD COVID-19 SPEC COLLECT: HCPCS

## 2021-05-07 ENCOUNTER — HOSPITAL ENCOUNTER (OUTPATIENT)
Dept: CARDIOLOGY | Facility: HOSPITAL | Age: 71
Discharge: HOME | End: 2021-05-07

## 2021-05-07 VITALS — WEIGHT: 235 LBS | HEIGHT: 69 IN | BODY MASS INDEX: 34.8 KG/M2

## 2021-05-07 VITALS
HEART RATE: 72 BPM | DIASTOLIC BLOOD PRESSURE: 78 MMHG | WEIGHT: 235.89 LBS | SYSTOLIC BLOOD PRESSURE: 156 MMHG | HEIGHT: 69 IN | BODY MASS INDEX: 34.94 KG/M2

## 2021-05-07 DIAGNOSIS — R06.09 DYSPNEA ON EXERTION: ICD-10-CM

## 2021-05-07 DIAGNOSIS — E78.5 HYPERLIPIDEMIA, UNSPECIFIED HYPERLIPIDEMIA TYPE: ICD-10-CM

## 2021-05-07 DIAGNOSIS — I10 ESSENTIAL HYPERTENSION: ICD-10-CM

## 2021-05-07 LAB
BH CV ECHO MEAS - AO MAX PG (FULL): 3.7 MMHG
BH CV ECHO MEAS - AO MAX PG: 7.8 MMHG
BH CV ECHO MEAS - AO MEAN PG (FULL): 2 MMHG
BH CV ECHO MEAS - AO MEAN PG: 4 MMHG
BH CV ECHO MEAS - AO ROOT AREA (BSA CORRECTED): 1.6
BH CV ECHO MEAS - AO ROOT AREA: 9.6 CM^2
BH CV ECHO MEAS - AO ROOT DIAM: 3.5 CM
BH CV ECHO MEAS - AO V2 MAX: 140 CM/SEC
BH CV ECHO MEAS - AO V2 MEAN: 95.2 CM/SEC
BH CV ECHO MEAS - AO V2 VTI: 33.1 CM
BH CV ECHO MEAS - AVA(I,A): 2.3 CM^2
BH CV ECHO MEAS - AVA(I,D): 2.3 CM^2
BH CV ECHO MEAS - AVA(V,A): 2.3 CM^2
BH CV ECHO MEAS - AVA(V,D): 2.3 CM^2
BH CV ECHO MEAS - BSA(HAYCOCK): 2.3 M^2
BH CV ECHO MEAS - BSA: 2.2 M^2
BH CV ECHO MEAS - BZI_BMI: 34.7 KILOGRAMS/M^2
BH CV ECHO MEAS - BZI_METRIC_HEIGHT: 175.3 CM
BH CV ECHO MEAS - BZI_METRIC_WEIGHT: 106.6 KG
BH CV ECHO MEAS - EDV(CUBED): 90.8 ML
BH CV ECHO MEAS - EDV(MOD-SP2): 69 ML
BH CV ECHO MEAS - EDV(MOD-SP4): 66 ML
BH CV ECHO MEAS - EDV(TEICH): 92.2 ML
BH CV ECHO MEAS - EF(CUBED): 72.3 %
BH CV ECHO MEAS - EF(MOD-BP): 72 %
BH CV ECHO MEAS - EF(MOD-SP2): 78.3 %
BH CV ECHO MEAS - EF(MOD-SP4): 66.7 %
BH CV ECHO MEAS - EF(TEICH): 64.2 %
BH CV ECHO MEAS - ESV(CUBED): 25.2 ML
BH CV ECHO MEAS - ESV(MOD-SP2): 15 ML
BH CV ECHO MEAS - ESV(MOD-SP4): 22 ML
BH CV ECHO MEAS - ESV(TEICH): 33 ML
BH CV ECHO MEAS - FS: 34.8 %
BH CV ECHO MEAS - IVS/LVPW: 1
BH CV ECHO MEAS - IVSD: 0.88 CM
BH CV ECHO MEAS - LA DIMENSION: 3.2 CM
BH CV ECHO MEAS - LA/AO: 0.91
BH CV ECHO MEAS - LAD MAJOR: 4.8 CM
BH CV ECHO MEAS - LAT PEAK E' VEL: 7.7 CM/SEC
BH CV ECHO MEAS - LATERAL E/E' RATIO: 7.9
BH CV ECHO MEAS - LV DIASTOLIC VOL/BSA (35-75): 29.8 ML/M^2
BH CV ECHO MEAS - LV MASS(C)D: 128.1 GRAMS
BH CV ECHO MEAS - LV MASS(C)DI: 57.9 GRAMS/M^2
BH CV ECHO MEAS - LV MAX PG: 4.2 MMHG
BH CV ECHO MEAS - LV MEAN PG: 2 MMHG
BH CV ECHO MEAS - LV SYSTOLIC VOL/BSA (12-30): 9.9 ML/M^2
BH CV ECHO MEAS - LV V1 MAX: 102 CM/SEC
BH CV ECHO MEAS - LV V1 MEAN: 64.2 CM/SEC
BH CV ECHO MEAS - LV V1 VTI: 24 CM
BH CV ECHO MEAS - LVIDD: 4.5 CM
BH CV ECHO MEAS - LVIDS: 2.9 CM
BH CV ECHO MEAS - LVLD AP2: 7.4 CM
BH CV ECHO MEAS - LVLD AP4: 7.3 CM
BH CV ECHO MEAS - LVLS AP2: 5.8 CM
BH CV ECHO MEAS - LVLS AP4: 6.3 CM
BH CV ECHO MEAS - LVOT AREA (M): 3.1 CM^2
BH CV ECHO MEAS - LVOT AREA: 3.1 CM^2
BH CV ECHO MEAS - LVOT DIAM: 2 CM
BH CV ECHO MEAS - LVPWD: 0.87 CM
BH CV ECHO MEAS - MED PEAK E' VEL: 7.1 CM/SEC
BH CV ECHO MEAS - MEDIAL E/E' RATIO: 8.5
BH CV ECHO MEAS - MV A MAX VEL: 76.7 CM/SEC
BH CV ECHO MEAS - MV DEC TIME: 0.23 SEC
BH CV ECHO MEAS - MV E MAX VEL: 60.2 CM/SEC
BH CV ECHO MEAS - MV E/A: 0.78
BH CV ECHO MEAS - PA ACC SLOPE: 443 CM/SEC^2
BH CV ECHO MEAS - PA ACC TIME: 0.15 SEC
BH CV ECHO MEAS - PA PR(ACCEL): 11.1 MMHG
BH CV ECHO MEAS - PULM DIAS VEL: 50 CM/SEC
BH CV ECHO MEAS - PULM S/D: 1.1
BH CV ECHO MEAS - PULM SYS VEL: 56.9 CM/SEC
BH CV ECHO MEAS - RAP SYSTOLE: 3 MMHG
BH CV ECHO MEAS - RVSP: 23 MMHG
BH CV ECHO MEAS - SI(AO): 144 ML/M^2
BH CV ECHO MEAS - SI(CUBED): 29.7 ML/M^2
BH CV ECHO MEAS - SI(LVOT): 34.1 ML/M^2
BH CV ECHO MEAS - SI(MOD-SP2): 24.4 ML/M^2
BH CV ECHO MEAS - SI(MOD-SP4): 19.9 ML/M^2
BH CV ECHO MEAS - SI(TEICH): 26.7 ML/M^2
BH CV ECHO MEAS - SV(AO): 318.5 ML
BH CV ECHO MEAS - SV(CUBED): 65.7 ML
BH CV ECHO MEAS - SV(LVOT): 75.4 ML
BH CV ECHO MEAS - SV(MOD-SP2): 54 ML
BH CV ECHO MEAS - SV(MOD-SP4): 44 ML
BH CV ECHO MEAS - SV(TEICH): 59.2 ML
BH CV ECHO MEAS - TAPSE (>1.6): 2.1 CM
BH CV ECHO MEAS - TR MAX PG: 20 MMHG
BH CV ECHO MEAS - TR MAX VEL: 224 CM/SEC
BH CV ECHO MEASUREMENTS AVERAGE E/E' RATIO: 8.14
BH CV REST NUCLEAR ISOTOPE DOSE: 9.7 MCI
BH CV STRESS BP STAGE 1: NORMAL
BH CV STRESS DURATION MIN STAGE 1: 4
BH CV STRESS DURATION SEC STAGE 1: 0
BH CV STRESS GRADE STAGE 1: 10
BH CV STRESS HR STAGE 1: 141
BH CV STRESS METS STAGE 1: 5
BH CV STRESS NUCLEAR ISOTOPE DOSE: 32.1 MCI
BH CV STRESS O2 STAGE 1: 94
BH CV STRESS PROTOCOL 1: NORMAL
BH CV STRESS SPEED STAGE 1: 1.7
BH CV STRESS STAGE 1: 1
BH CV VAS BP LEFT ARM: NORMAL MMHG
BH CV XLRA - RV BASE: 3.2 CM
BH CV XLRA - RV LENGTH: 7.2 CM
BH CV XLRA - RV MID: 3.3 CM
BH CV XLRA - TDI S': 9.37 CM/SEC
LEFT ATRIUM VOLUME INDEX: 14.9 ML/M^2
LEFT ATRIUM VOLUME: 33 ML
LV EF NUC BP: 75 %
MAXIMAL PREDICTED HEART RATE: 149 BPM
MAXIMAL PREDICTED HEART RATE: 149 BPM
PERCENT MAX PREDICTED HR: 94.63 %
STRESS BASELINE BP: NORMAL MMHG
STRESS BASELINE HR: 75 BPM
STRESS O2 SAT REST: 97 %
STRESS PERCENT HR: 111 %
STRESS POST ESTIMATED WORKLOAD: 1 METS
STRESS POST EXERCISE DUR MIN: 4 MIN
STRESS POST EXERCISE DUR SEC: 0 SEC
STRESS POST PEAK BP: NORMAL MMHG
STRESS POST PEAK HR: 141 BPM
STRESS TARGET HR: 127 BPM
STRESS TARGET HR: 127 BPM

## 2021-05-07 PROCEDURE — 93306 TTE W/DOPPLER COMPLETE: CPT | Performed by: INTERNAL MEDICINE

## 2021-05-07 PROCEDURE — 93306 TTE W/DOPPLER COMPLETE: CPT

## 2021-05-07 PROCEDURE — 93018 CV STRESS TEST I&R ONLY: CPT | Performed by: INTERNAL MEDICINE

## 2021-05-07 PROCEDURE — A9500 TC99M SESTAMIBI: HCPCS | Performed by: NURSE PRACTITIONER

## 2021-05-07 PROCEDURE — 78452 HT MUSCLE IMAGE SPECT MULT: CPT | Performed by: INTERNAL MEDICINE

## 2021-05-07 PROCEDURE — 78452 HT MUSCLE IMAGE SPECT MULT: CPT

## 2021-05-07 PROCEDURE — 0 TECHNETIUM SESTAMIBI: Performed by: NURSE PRACTITIONER

## 2021-05-07 PROCEDURE — 93017 CV STRESS TEST TRACING ONLY: CPT

## 2021-05-07 RX ADMIN — TECHNETIUM TC 99M SESTAMIBI 1 DOSE: 1 INJECTION INTRAVENOUS at 15:15

## 2021-05-07 RX ADMIN — TECHNETIUM TC 99M SESTAMIBI 1 DOSE: 1 INJECTION INTRAVENOUS at 12:40

## 2021-05-14 ENCOUNTER — OFFICE VISIT (OUTPATIENT)
Dept: INTERNAL MEDICINE | Facility: CLINIC | Age: 71
End: 2021-05-14

## 2021-05-14 VITALS
HEART RATE: 85 BPM | SYSTOLIC BLOOD PRESSURE: 126 MMHG | DIASTOLIC BLOOD PRESSURE: 80 MMHG | TEMPERATURE: 97.3 F | WEIGHT: 243.4 LBS | BODY MASS INDEX: 36.05 KG/M2 | OXYGEN SATURATION: 95 % | HEIGHT: 69 IN

## 2021-05-14 DIAGNOSIS — E66.01 SEVERE OBESITY WITH BODY MASS INDEX (BMI) OF 35.0 TO 39.9 WITH COMORBIDITY (HCC): ICD-10-CM

## 2021-05-14 DIAGNOSIS — I45.9 SKIPPED HEART BEATS: Primary | ICD-10-CM

## 2021-05-14 DIAGNOSIS — I10 ESSENTIAL HYPERTENSION: ICD-10-CM

## 2021-05-14 DIAGNOSIS — R79.89 ELEVATED SERUM CREATININE: ICD-10-CM

## 2021-05-14 DIAGNOSIS — E78.5 HYPERLIPIDEMIA, UNSPECIFIED HYPERLIPIDEMIA TYPE: ICD-10-CM

## 2021-05-14 PROCEDURE — 99215 OFFICE O/P EST HI 40 MIN: CPT | Performed by: STUDENT IN AN ORGANIZED HEALTH CARE EDUCATION/TRAINING PROGRAM

## 2021-05-14 NOTE — PROGRESS NOTES
"Chief Complaint  Keisha Malagon is a 70 y.o. female presenting for Hospital Follow Up Visit and Medication Problem (HCTZ).     Patient has a past medical history of hypertension, gastritis, overactive bladder and pelvic floor dysfunction, hyperlipidemia, allergic rhinitis, morbid obesity osteoarthritis and SULAIMAN on CPAP.    History of Present Illness  Patient was recently worked up for AARON and was seen in [facility] ED on 5/2/2021 for exertional palpitations/skipped beats.  No near syncope.  She had been doing physically demanding yard work and when she came in and sat down she noticed some extra beats that would occur once or twice per minute.  She did not have chest pain.  Work-up in ED was negative.  She also has recently undergone TTE and stress test that was not concerning for coronary heart disease.  Of note patient recently had mild edema of her ankles and with elevated blood pressure, she was started on hydrochlorothiazide.  Patient states this is when her symptoms started, and she discontinued hydrochlorothiazide 2 days ago.  Since that she has not noticed any more symptoms.  She also was on Vascepa for hyper triglyceridemia and was on it for years, she states she tried statins in the past and with all of them she had muscle pain, and when she discontinued Vascepa her muscle pain went away.    The following portions of the patient's history were reviewed and updated as appropriate: allergies, current medications, past family history, past medical history, past social history, past surgical history and problem list.    Objective  /80 (BP Location: Left arm, Patient Position: Sitting, Cuff Size: Large Adult)   Pulse 85   Temp 97.3 °F (36.3 °C) (Temporal)   Ht 175.3 cm (69.02\")   Wt 110 kg (243 lb 6.4 oz)   SpO2 95%   BMI 35.93 kg/m²     Physical Exam  Vitals reviewed.   Constitutional:       Appearance: Normal appearance.   HENT:      Head: Normocephalic and atraumatic.      Nose: No congestion.   Eyes:     "  Extraocular Movements: Extraocular movements intact.      Conjunctiva/sclera: Conjunctivae normal.   Neck:      Vascular: No carotid bruit.   Cardiovascular:      Rate and Rhythm: Normal rate and regular rhythm.      Heart sounds: Normal heart sounds. No murmur heard.     Pulmonary:      Effort: Pulmonary effort is normal.      Breath sounds: Normal breath sounds.   Abdominal:      General: There is no distension.      Palpations: Abdomen is soft. There is no mass.      Tenderness: There is no abdominal tenderness.   Musculoskeletal:      Cervical back: Neck supple.      Right lower leg: No edema.      Left lower leg: Edema present.      Comments: Patient states left ankle swelling is at baseline after trimalleolar fracture with hardware in the past.   Skin:     General: Skin is warm and dry.   Neurological:      Mental Status: She is alert and oriented to person, place, and time. Mental status is at baseline.   Psychiatric:         Behavior: Behavior normal.         Thought Content: Thought content normal.         Assessment/Plan   1. Skipped heart beats  Likely benign.  Resolved after discontinuing hydrochlorothiazide.  Might consider Holter if it recurs or persists.  I have counseled her on exercise, with gradually increasing intensity as tolerated.  She can also attempt interval exercise with short bursts.    2. Essential hypertension  BP Readings from Last 3 Encounters:   05/14/21 126/80   05/07/21 156/78   05/02/21 130/76   Blood pressure currently at goal.  Patient states home blood pressures are mostly below 135/85 at baseline.  Given her recent new symptoms I recommend she continues without blood pressure medication for now and returns for follow-up visit DEBBIE Yoon within 2 to 3 weeks for recheck.  Of also instructed her to check her home blood pressures consistently at the same time, and sit down for about 10 minutes before she checks it.  I have instructed her to bring in the measurements to her follow-up  visit with DEBBIE Yoon.    3. Severe obesity with body mass index (BMI) of 35.0 to 39.9 with comorbidity (CMS/[facility])  Patient's Body mass index is 35.93 kg/m². indicating that she is morbidly obese (BMI > 40 or > 35 with obesity - related health condition). Obesity-related health conditions include the following: obstructive sleep apnea, hypertension, dyslipidemias and osteoarthritis. Obesity is unchanged. BMI is is above average; BMI management plan is completed. We discussed portion control and increasing exercise.. Reviewed healthy plate and reducing portion sizes.    4. Hyperlipidemia, unspecified hyperlipidemia type  Intolerant for statins and Vascepa.  She can always try regular fish oil with omega-3 given her elevated triglycerides.    5. Elevated serum creatinine  Borderline elevated creatinine to 1.02.  Also mildly elevated glucose to 106.  Recommend follow-up with PCP for recheck.    Total time spent on chart review, charting and face-to-face with patient 50 minutes.    Return in about 3 weeks (around 6/4/2021) for Recheck BP.  I have recommended patient says a MyChart message to DEBBIE Yoon to check if she would want blood work done prior to her visit.    Future Appointments       Provider Department Center    5/21/2021 1:15 PM Iva Watson RD [facility]    6/4/2021 1:00 PM Patt Yoon PA-C [facility]          Luis Ma MD  Family Medicine  05/14/2021    Note: Speech recognition transcription software may have been used to create portions of this document.  An attempt at proofreading has been made but errors in transcription could still be present.

## 2021-05-17 ENCOUNTER — TELEPHONE (OUTPATIENT)
Dept: INTERNAL MEDICINE | Facility: CLINIC | Age: 71
End: 2021-05-17

## 2021-05-17 DIAGNOSIS — J30.89 NON-SEASONAL ALLERGIC RHINITIS, UNSPECIFIED TRIGGER: ICD-10-CM

## 2021-05-17 RX ORDER — FLUTICASONE PROPIONATE 50 MCG
2 SPRAY, SUSPENSION (ML) NASAL DAILY
OUTPATIENT
Start: 2021-05-17

## 2021-05-19 ENCOUNTER — TELEPHONE (OUTPATIENT)
Dept: INTERNAL MEDICINE | Facility: CLINIC | Age: 71
End: 2021-05-19

## 2021-05-19 DIAGNOSIS — J30.89 NON-SEASONAL ALLERGIC RHINITIS, UNSPECIFIED TRIGGER: ICD-10-CM

## 2021-05-19 RX ORDER — FLUTICASONE PROPIONATE 50 MCG
1 SPRAY, SUSPENSION (ML) NASAL 2 TIMES DAILY
Qty: 29.7 ML | Refills: 3 | Status: SHIPPED | OUTPATIENT
Start: 2021-05-19 | End: 2022-04-11

## 2021-05-20 NOTE — TELEPHONE ENCOUNTER
Patient called and asked can PCP change Flonase  30 day supply to 90 day supply, states she doesn't want to go through this every month

## 2021-05-20 NOTE — TELEPHONE ENCOUNTER
Tried to call patient with no answer and VM not set up.    Plan: We provided a good rx card at todays visit Detail Level: Zone Render In Strict Bullet Format?: No Initiate Treatment: Apply fluorouracil cream bid to face for two weeks

## 2021-05-21 ENCOUNTER — HOSPITAL ENCOUNTER (OUTPATIENT)
Dept: NUTRITION | Facility: HOSPITAL | Age: 71
Setting detail: RECURRING SERIES
Discharge: HOME | End: 2021-05-21

## 2021-05-21 VITALS — WEIGHT: 243 LBS | BODY MASS INDEX: 35.99 KG/M2 | HEIGHT: 69 IN

## 2021-05-21 PROCEDURE — 97802 MEDICAL NUTRITION INDIV IN: CPT

## 2021-06-04 ENCOUNTER — TELEMEDICINE (OUTPATIENT)
Dept: FAMILY MEDICINE CLINIC | Facility: TELEHEALTH | Age: 71
End: 2021-06-04

## 2021-06-04 ENCOUNTER — OFFICE VISIT (OUTPATIENT)
Dept: CARDIOLOGY | Facility: HOSPITAL | Age: 71
End: 2021-06-04

## 2021-06-04 VITALS — BODY MASS INDEX: 35.77 KG/M2 | HEIGHT: 68 IN | WEIGHT: 236 LBS | TEMPERATURE: 98.6 F

## 2021-06-04 VITALS
BODY MASS INDEX: 35.4 KG/M2 | OXYGEN SATURATION: 95 % | WEIGHT: 239 LBS | SYSTOLIC BLOOD PRESSURE: 142 MMHG | RESPIRATION RATE: 20 BRPM | DIASTOLIC BLOOD PRESSURE: 73 MMHG | HEIGHT: 69 IN | TEMPERATURE: 96.4 F | HEART RATE: 70 BPM

## 2021-06-04 DIAGNOSIS — R06.09 DYSPNEA ON EXERTION: Primary | ICD-10-CM

## 2021-06-04 DIAGNOSIS — J01.40 ACUTE PANSINUSITIS, RECURRENCE NOT SPECIFIED: ICD-10-CM

## 2021-06-04 DIAGNOSIS — E78.5 HYPERLIPIDEMIA, UNSPECIFIED HYPERLIPIDEMIA TYPE: ICD-10-CM

## 2021-06-04 DIAGNOSIS — H66.90 ACUTE OTITIS MEDIA, UNSPECIFIED OTITIS MEDIA TYPE: Primary | ICD-10-CM

## 2021-06-04 DIAGNOSIS — I10 ESSENTIAL HYPERTENSION: ICD-10-CM

## 2021-06-04 PROCEDURE — 99213 OFFICE O/P EST LOW 20 MIN: CPT | Performed by: NURSE PRACTITIONER

## 2021-06-04 RX ORDER — AMOXICILLIN AND CLAVULANATE POTASSIUM 875; 125 MG/1; MG/1
1 TABLET, FILM COATED ORAL 2 TIMES DAILY
Qty: 20 TABLET | Refills: 0 | Status: SHIPPED | OUTPATIENT
Start: 2021-06-04 | End: 2021-06-14

## 2021-06-04 NOTE — PATIENT INSTRUCTIONS
Sinusitis, Adult  Sinusitis is inflammation of your sinuses. Sinuses are hollow spaces in the bones around your face. Your sinuses are located:  · Around your eyes.  · In the middle of your forehead.  · Behind your nose.  · In your cheekbones.  Mucus normally drains out of your sinuses. When your nasal tissues become inflamed or swollen, mucus can become trapped or blocked. This allows bacteria, viruses, and fungi to grow, which leads to infection. Most infections of the sinuses are caused by a virus.  Sinusitis can develop quickly. It can last for up to 4 weeks (acute) or for more than 12 weeks (chronic). Sinusitis often develops after a cold.  What are the causes?  This condition is caused by anything that creates swelling in the sinuses or stops mucus from draining. This includes:  · Allergies.  · Asthma.  · Infection from bacteria or viruses.  · Deformities or blockages in your nose or sinuses.  · Abnormal growths in the nose (nasal polyps).  · Pollutants, such as chemicals or irritants in the air.  · Infection from fungi (rare).  What increases the risk?  You are more likely to develop this condition if you:  · Have a weak body defense system (immune system).  · Do a lot of swimming or diving.  · Overuse nasal sprays.  · Smoke.  What are the signs or symptoms?  The main symptoms of this condition are pain and a feeling of pressure around the affected sinuses. Other symptoms include:  · Stuffy nose or congestion.  · Thick drainage from your nose.  · Swelling and warmth over the affected sinuses.  · Headache.  · Upper toothache.  · A cough that may get worse at night.  · Extra mucus that collects in the throat or the back of the nose (postnasal drip).  · Decreased sense of smell and taste.  · Fatigue.  · A fever.  · Sore throat.  · Bad breath.  How is this diagnosed?  This condition is diagnosed based on:  · Your symptoms.  · Your medical history.  · A physical exam.  · Tests to find out if your condition is  acute or chronic. This may include:  ? Checking your nose for nasal polyps.  ? Viewing your sinuses using a device that has a light (endoscope).  ? Testing for allergies or bacteria.  ? Imaging tests, such as an MRI or CT scan.  In rare cases, a bone biopsy may be done to rule out more serious types of fungal sinus disease.  How is this treated?  Treatment for sinusitis depends on the cause and whether your condition is chronic or acute.  · If caused by a virus, your symptoms should go away on their own within 10 days. You may be given medicines to relieve symptoms. They include:  ? Medicines that shrink swollen nasal passages (topical intranasal decongestants).  ? Medicines that treat allergies (antihistamines).  ? A spray that eases inflammation of the nostrils (topical intranasal corticosteroids).  ? Rinses that help get rid of thick mucus in your nose (nasal saline washes).  · If caused by bacteria, your health care provider may recommend waiting to see if your symptoms improve. Most bacterial infections will get better without antibiotic medicine. You may be given antibiotics if you have:  ? A severe infection.  ? A weak immune system.  · If caused by narrow nasal passages or nasal polyps, you may need to have surgery.  Follow these instructions at home:  Medicines  · Take, use, or apply over-the-counter and prescription medicines only as told by your health care provider. These may include nasal sprays.  · If you were prescribed an antibiotic medicine, take it as told by your health care provider. Do not stop taking the antibiotic even if you start to feel better.  Hydrate and humidify    · Drink enough fluid to keep your urine pale yellow. Staying hydrated will help to thin your mucus.  · Use a cool mist humidifier to keep the humidity level in your home above 50%.  · Inhale steam for 10-15 minutes, 3-4 times a day, or as told by your health care provider. You can do this in the bathroom while a hot shower is  running.  · Limit your exposure to cool or dry air.  Rest  · Rest as much as possible.  · Sleep with your head raised (elevated).  · Make sure you get enough sleep each night.  General instructions    · Apply a warm, moist washcloth to your face 3-4 times a day or as told by your health care provider. This will help with discomfort.  · Wash your hands often with soap and water to reduce your exposure to germs. If soap and water are not available, use hand .  · Do not smoke. Avoid being around people who are smoking (secondhand smoke).  · Keep all follow-up visits as told by your health care provider. This is important.  Contact a health care provider if:  · You have a fever.  · Your symptoms get worse.  · Your symptoms do not improve within 10 days.  Get help right away if:  · You have a severe headache.  · You have persistent vomiting.  · You have severe pain or swelling around your face or eyes.  · You have vision problems.  · You develop confusion.  · Your neck is stiff.  · You have trouble breathing.  Summary  · Sinusitis is soreness and inflammation of your sinuses. Sinuses are hollow spaces in the bones around your face.  · This condition is caused by nasal tissues that become inflamed or swollen. The swelling traps or blocks the flow of mucus. This allows bacteria, viruses, and fungi to grow, which leads to infection.  · If you were prescribed an antibiotic medicine, take it as told by your health care provider. Do not stop taking the antibiotic even if you start to feel better.  · Keep all follow-up visits as told by your health care provider. This is important.  This information is not intended to replace advice given to you by your health care provider. Make sure you discuss any questions you have with your health care provider.  Document Revised: 05/20/2019 Document Reviewed: 05/20/2019  [employer] Patient Education © 2021 [employer]  Otitis Media, Adult    Otitis media occurs when there is  inflammation and fluid in the middle ear space with signs and symptoms of an acute infection. The middle ear is a part of the ear that contains bones for hearing as well as air that helps send sounds to the brain. When infected fluid builds up in this space, it causes pressure and results in symptoms of acute otitis media. The eustachian tube connects the middle ear to the back of the nose (nasopharynx) and normally allows air into the middle ear space. If the eustachian tube becomes blocked, fluid can build up and become infected.  What are the causes?  This condition is caused by a blockage in the eustachian tube. This can be caused by an object like mucus, or by swelling (edema) of the tube. Problems that can cause a blockage include:  · A cold or other upper respiratory infection.  · Allergies.  · An irritant, such as tobacco smoke.  · Enlarged adenoids. The adenoids are areas of soft tissue located high in the back of the throat, behind the nose and the roof of the mouth. They are part of the body's defense system (immune system).  · A mass in the nasopharynx.  · Damage to the ear caused by pressure changes (barotrauma).  What are the signs or symptoms?  Symptoms of this condition include:  · Ear pain.  · Fever.  · Decreased hearing.  · Tiredness (lethargy).  · Fluid leaking from the ear, if the eardrum is ruptured or has burst.  · Ringing in the ear.  How is this diagnosed?    This condition is diagnosed with a physical exam. During the exam, your health care provider will use an instrument called an otoscope to look in your ear and check for redness, swelling, and fluid. He or she will also ask about your symptoms.  Your health care provider may also order tests, such as:  · A pneumatic otoscopy. This is a test to check the movement of the eardrum. It is done by squeezing a small amount of air into the ear.  · A tympanogram is a test that shows how well the eardrum moves in response to air pressure in the ear  canal. It provides a graph for your health care provider to review.  How is this treated?  This condition can go away on its own within 3-5 days. But if the condition is caused by a bacterial infection and does not go away on its own, or if it keeps coming back, your health care provider may:  · Prescribe antibiotic medicine to treat the infection.  · Prescribe or recommend medicines to control pain.  Follow these instructions at home:  · Take over-the-counter and prescription medicines only as told by your health care provider.  · If you were prescribed an antibiotic medicine, take it as told by your health care provider. Do not stop taking the antibiotic even if you start to feel better.  · Keep all follow-up visits as told by your health care provider. This is important.  Contact a health care provider if:  · You have bleeding from your nose.  · There is a lump on your neck.  · You are not feeling better in 5 days.  · You feel worse instead of better.  Get help right away if:  · You have severe pain that is not controlled with medicine.  · You have swelling, redness, or pain around your ear.  · You have stiffness in your neck.  · A part of your face is not moving (paralyzed).  · The bone behind your ear (mastoid) is tender when you touch it.  · You develop a severe headache.  Summary  · Otitis media is redness, soreness, and swelling of the middle ear, usually resulting in pain.  · This condition can go away on its own within 3-5 days.  · If the problem does not go away in 3-5 days, your health care provider may prescribe or recommend medicines to treat the infection or your symptoms.  · If you were prescribed an antibiotic medicine, take it as told by your health care provider.  · Follow all instructions you were given by your health care provider.  This information is not intended to replace advice given to you by your health care provider. Make sure you discuss any questions you have with your health care  provider.  Document Revised: 11/19/2020 Document Reviewed: 11/19/2020  [employer] Patient Education © 2021 [employer]

## 2021-06-04 NOTE — PROGRESS NOTES
CHIEF COMPLAINT  Cc; ear pain    HPI  Keisha Malagon is a 70 y.o. female  presents with complaint of ear pain. It is bilateral but right now it is worse on the left side.She also has frontal and maxillary sinus pain and pressure without nasal congestion. No fever reported. Her throat is scratchy and her glands are swollen. She does not feel she needs a COVID-19 test at this time and has had the Moderna vaccines. She has used fluticasone and taken tylenol arthritis for her symptoms which she has had for a week now..    Review of Systems   Constitutional: Positive for fatigue. Negative for fever.   HENT: Positive for ear pain, sinus pressure, sinus pain and sore throat (scratchy). Negative for congestion.    Respiratory: Negative for cough, shortness of breath and wheezing.    Cardiovascular: Positive for chest pain (baseline saw cardiologist today, has PVC's).   Gastrointestinal: Negative for diarrhea, nausea and vomiting.   Musculoskeletal: Negative for myalgias.   Neurological: Positive for headaches. Negative for dizziness.       Past Medical History:   Diagnosis Date   • Allergic    • Breast injury 11/2016    pt feel and injured left breast, bruise has cleared, but still feels a lump in that area   • DDD (degenerative disc disease), lumbar    • GERD (gastroesophageal reflux disease)    • Irritable bowel syndrome    • Kidney stone        Family History   Problem Relation Age of Onset   • Ovarian cancer Maternal Grandmother 67   • Arthritis Mother    • Osteoporosis Mother    • Sjogren's syndrome Mother    • Lung disease Mother         ILD due to sjogrens   • Stroke Father    • Coronary artery disease Father    • No Known Problems Son    • No Known Problems Daughter    • Breast cancer Neg Hx        Social History     Socioeconomic History   • Marital status: Single     Spouse name: Not on file   • Number of children: Not on file   • Years of education: Not on file   • Highest education level: Not on file   Tobacco  "Use   • Smoking status: Former Smoker     Packs/day: 0.25     Years: 1.00     Pack years: 0.25     Types: Cigarettes     Quit date: 1968     Years since quittin.4   • Smokeless tobacco: Never Used   • Tobacco comment: 4 cigarettes a week senior year of highschool   Vaping Use   • Vaping Use: Never used   Substance and Sexual Activity   • Alcohol use: No   • Drug use: Not Currently     Types: Marijuana     Comment: in high school   • Sexual activity: Defer         Temp 98.6 °F (37 °C)   Ht 172.7 cm (68\")   Wt 107 kg (236 lb)   BMI 35.88 kg/m²     PHYSICAL EXAM  Physical Exam   Constitutional: She is oriented to person, place, and time. She appears well-developed and well-nourished.   HENT:   Head: Normocephalic and atraumatic.   Right Ear: Hearing and external ear normal.   Left Ear: Hearing and external ear normal. There is tenderness.   Nose: Right sinus exhibits maxillary sinus tenderness (patient directed exam) and frontal sinus tenderness (patient directed exam). Left sinus exhibits maxillary sinus tenderness (patient directed exam) and frontal sinus tenderness (patient directed exam).   Mouth/Throat: Mouth/Lips are normal.  Eyes: Lids are normal. Right eye exhibits no discharge and no exudate. Left eye exhibits no discharge and no exudate. Right conjunctiva is not injected. Left conjunctiva is not injected.   Pulmonary/Chest: No accessory muscle usage. No tachypnea and no bradypnea.  No respiratory distress.No use of oxygen by nasal cannulaNo use of oxygen by mask noted.  Abdominal: Abdomen appears normal.   Lymphadenopathy:        Right cervical: Anterior cervical (patient directed exam) adenopathy present.        Left cervical: Anterior cervical (patient directed exam) adenopathy present.   Neurological: She is alert and oriented to person, place, and time. No cranial nerve deficit.   Skin: Her skin appears normal.  Psychiatric: She has a normal mood and affect. Her speech is normal and behavior is " normal. Judgment and thought content normal.       Results for orders placed or performed during the hospital encounter of 05/07/21   Adult Transthoracic Echo Complete W/ Cont if Necessary Per Protocol   Result Value Ref Range    BSA 2.2 m^2    IVSd 0.88 cm    LVIDd 4.5 cm    LVIDs 2.9 cm    LVPWd 0.87 cm    IVS/LVPW 1.0     FS 34.8 %    EDV(Teich) 92.2 ml    ESV(Teich) 33.0 ml    EF(Teich) 64.2 %    EDV(cubed) 90.8 ml    ESV(cubed) 25.2 ml    EF(cubed) 72.3 %    LV mass(C)d 128.1 grams    LV mass(C)dI 57.9 grams/m^2    SV(Teich) 59.2 ml    SI(Teich) 26.7 ml/m^2    SV(cubed) 65.7 ml    SI(cubed) 29.7 ml/m^2    Ao root diam 3.5 cm    Ao root area 9.6 cm^2    LA dimension 3.2 cm    LA/Ao 0.91     LVOT diam 2.0 cm    LVOT area 3.1 cm^2    LVOT area(traced) 3.1 cm^2    LAd major 4.8 cm    LVLd ap4 7.3 cm    EDV(MOD-sp4) 66.0 ml    LVLs ap4 6.3 cm    ESV(MOD-sp4) 22.0 ml    EF(MOD-sp4) 66.7 %    LVLd ap2 7.4 cm    EDV(MOD-sp2) 69.0 ml    LVLs ap2 5.8 cm    ESV(MOD-sp2) 15.0 ml    EF(MOD-sp2) 78.3 %    LA volume 33.0 ml    EF(MOD-bp) 72.0 %    SV(MOD-sp4) 44.0 ml    SI(MOD-sp4) 19.9 ml/m^2    SV(MOD-sp2) 54.0 ml    SI(MOD-sp2) 24.4 ml/m^2    Ao root area (BSA corrected) 1.6     LV Calderon Vol (BSA corrected) 29.8 ml/m^2    LV Sys Vol (BSA corrected) 9.9 ml/m^2    LA Volume Index 14.9 ml/m^2    MV E max anna 60.2 cm/sec    MV A max anna 76.7 cm/sec    MV E/A 0.78     MV dec time 0.23 sec    Ao pk anna 140.0 cm/sec    Ao max PG 7.8 mmHg    Ao max PG (full) 3.7 mmHg    Ao V2 mean 95.2 cm/sec    Ao mean PG 4.0 mmHg    Ao mean PG (full) 2.0 mmHg    Ao V2 VTI 33.1 cm    MARTÍN(I,A) 2.3 cm^2    MARTÍN(I,D) 2.3 cm^2    MARTÍN(V,A) 2.3 cm^2    MARTÍN(V,D) 2.3 cm^2    LV V1 max PG 4.2 mmHg    LV V1 mean PG 2.0 mmHg    LV V1 max 102.0 cm/sec    LV V1 mean 64.2 cm/sec    LV V1 VTI 24.0 cm    SV(Ao) 318.5 ml    SI(Ao) 144.0 ml/m^2    SV(LVOT) 75.4 ml    SI(LVOT) 34.1 ml/m^2    PA acc slope 443.0 cm/sec^2    PA acc time 0.15 sec    TR max anna 224.0  cm/sec    TR max PG 20.0 mmHg    PA pr(Accel) 11.1 mmHg    Pulm Sys Travis 56.9 cm/sec    Pulm Calderon Travis 50.0 cm/sec    Pulm S/D 1.1     Lat E/e'  7.9     Med E/e' 8.5     Lat Peak E' Travis 7.7 cm/sec    Med Peak E' Travis 7.1 cm/sec     CV ECHO GERMAINE - BZI_BMI 34.7 kilograms/m^2     CV ECHO GERMAINE - BSA(HAYCOCK) 2.3 m^2     CV ECHO GERMAINE - BZI_METRIC_WEIGHT 106.6 kg     CV ECHO GERMAINE - BZI_METRIC_HEIGHT 175.3 cm    Avg E/e' ratio 8.14     Target HR (85%) 127 bpm    Max. Pred. HR (100%) 149 bpm     CV VAS BP LEFT /79 mmHg    RV S' 9.37 cm/sec    RV Base 3.20 cm    RV Length 7.20 cm    RV Mid 3.30 cm    RAP systole 3 mmHg    RVSP(TR) 23 mmHg    TAPSE (>1.6) 2.10 cm       Diagnoses and all orders for this visit:    1. Acute otitis media, unspecified otitis media type (Primary)    2. Acute pansinusitis, recurrence not specified    Other orders  -     amoxicillin-clavulanate (Augmentin) 875-125 MG per tablet; Take 1 tablet by mouth 2 (Two) Times a Day for 10 days.  Dispense: 20 tablet; Refill: 0  -     guaiFENesin (Mucinex) 600 MG 12 hr tablet; Take 1 tablet by mouth 2 (Two) Times a Day for 14 days.  Dispense: 28 tablet; Refill: 0    Probiotics for two weeks related to taking antibiotics. The pharmacist can help you with this if needed.  Do not take within two hours of antibiotic  Mucinex with plenty of fluids especially water to thin secretions and help with congestion.      FOLLOW-UP  If symptoms worsen or persist follow up with PCP/Virtual Care or Urgent Care    COVID-19  if at any time you experience fever AND/OR cough AND/OR shortness of breath AND/OR loss of sense of taste or smell you are being advised to go to nearest urgent care or emergency department for evaluation AND/OR testing    Patient verbalizes understanding of medication dosage, comfort measures, instructions for treatment and follow-up.    Francisca Jaramillo, CIARA  06/04/2021  15:11 EDT    This visit was performed via Telehealth.  This patient has been  instructed to follow-up with their primary care provider if their symptoms worsen or the treatment provided does not resolve their illness.

## 2021-06-07 NOTE — PROGRESS NOTES
"Chief Complaint  Follow-up and Shortness of Breath    Subjective    History of Present Illness {CC  Problem List  Visit  Diagnosis   Encounters  Notes  Medications  Labs  Result Review Imaging  Media :23}       History of Present Illness   70-year-old female presents the office today for ongoing evaluation of dyspnea and hypertension.  She reports that she stopped her HCTZ due to increased palpitations and possible dehydration.  Reports blood pressures have been 120s systolic over 80s.  She reports overall she is feeling well.  Currently denies chest pain, dyspnea, palpitations, dizziness, orthopnea, PND or pedal edema.  Recently had stress and echo that were both within normal limits.  Objective     Vital Signs:   Vitals:    06/04/21 1051   BP: 142/73   BP Location: Left arm   Patient Position: Sitting   Cuff Size: Adult   Pulse: 70   Resp: 20   Temp: 96.4 °F (35.8 °C)   TempSrc: Temporal   SpO2: 95%   Weight: 108 kg (239 lb)   Height: 175.3 cm (69\")     Body mass index is 35.29 kg/m².  Physical Exam  Vitals and nursing note reviewed.   Constitutional:       Appearance: Normal appearance.   HENT:      Head: Normocephalic.   Eyes:      Pupils: Pupils are equal, round, and reactive to light.   Cardiovascular:      Rate and Rhythm: Normal rate and regular rhythm.      Pulses: Normal pulses.      Heart sounds: Normal heart sounds. No murmur heard.     Pulmonary:      Effort: Pulmonary effort is normal.      Breath sounds: Normal breath sounds.   Abdominal:      General: Bowel sounds are normal.      Palpations: Abdomen is soft.   Musculoskeletal:         General: Normal range of motion.      Cervical back: Normal range of motion.      Right lower leg: No edema.      Left lower leg: No edema.   Skin:     General: Skin is warm and dry.      Capillary Refill: Capillary refill takes less than 2 seconds.   Neurological:      Mental Status: She is alert and oriented to person, place, and time.   Psychiatric:        "  Mood and Affect: Mood normal.         Thought Content: Thought content normal.              Result Review  Data Reviewed:{ Labs  Result Review  Imaging  Med Tab  Media :23}   Troponin (05/02/2021 00:21)  CBC & Differential (05/02/2021 00:21)  Comprehensive Metabolic Panel (05/02/2021 00:21)  Lipase (05/02/2021 00:21)  BNP (05/02/2021 00:21)  Troponin (05/02/2021 02:51)  Adult Transthoracic Echo Complete W/ Cont if Necessary Per Protocol (05/07/2021 12:24)  Stress Test With Myocardial Perfusion (1 Day) (05/07/2021 16:45)               Assessment and Plan {CC Problem List  Visit Diagnosis  ROS  Review (Popup)  Health Maintenance  Quality  BestPractice  Medications  SmartSets  SnapShot Encounters  Media :23}   1. Dyspnea on exertion  Resolved   Normal stress and echo recently     2. Essential hypertension  Well controlled  HTN Education provided today including signs and symptoms, medication management, daily blood pressure monitoring. Patient encouraged to call the [facility] with any abnormal readings.     3. Hyperlipidemia, unspecified hyperlipidemia type  Diet controlled        Follow Up {Instructions Charge Capture  Follow-up Communications :23}   Return if symptoms worsen or fail to improve.    Patient was given instructions and counseling regarding her condition or for health maintenance advice. Please see specific information pulled into the AVS if appropriate.  Patient was instructed to call the [facility] with any questions, concerns, or worsening symptoms.    *Please note that portions of this note were completed with a voice recognition program. Efforts were made to edit the dictations, but occasionally words are mistranscribed.

## 2021-07-22 ENCOUNTER — TELEPHONE (OUTPATIENT)
Dept: INTERNAL MEDICINE | Facility: CLINIC | Age: 71
End: 2021-07-22

## 2021-07-22 NOTE — TELEPHONE ENCOUNTER
PATIENT WOULD LIKE REFERRAL TO [facility] ([facility] ON [address] FOR SHORTNESS OF AIR.      PLEASE CALL [phone]

## 2021-10-19 ENCOUNTER — E-VISIT (OUTPATIENT)
Dept: FAMILY MEDICINE CLINIC | Facility: TELEHEALTH | Age: 71
End: 2021-10-19

## 2021-10-19 PROCEDURE — BRIGHTMDVISIT: Performed by: NURSE PRACTITIONER

## 2021-10-19 RX ORDER — PREDNISONE 20 MG/1
20 TABLET ORAL 2 TIMES DAILY
Qty: 6 TABLET | Refills: 0 | Status: SHIPPED | OUTPATIENT
Start: 2021-10-19 | End: 2021-10-22

## 2021-10-19 NOTE — E-VISIT TREATED
Chief Complaint: Ear pain   Patient introduction   Patient is 71-year-old female who reports an ache, pressure, and throbbing pain in left ear.   Patient-submitted comments   Patient writes: I believe that I have a sinus infection and possible ear infection. I frequently have this problem around this time of the year, They usually give me Augmentin and Prednisone and it works..   Patient did not request an excuse note.   General presentation   Patient first noticed symptoms yesterday. They came on suddenly. Symptoms are always there. Patient rates their pain as moderate (4-6/10).   Patient denies fever.   No symptoms of tinnitus. No hearing loss. There has been no drainage.   Denies history of PE tubes.   Denies either ear is red, swollen, warm to the touch, or painful to the touch.   Patient also reports:    Respiratory symptoms, including stuffy nose, postnasal drip, sinus pain or pressure, and scratchy throat. Reports sinus/nasal symptoms for 3-5 days. Patient denies improvement of symptoms.    Acid reflux symptoms, including sore throat and hoarse voice and history of GERD.    Headache pain that is mild/moderate.   Denies recent airplane travel, scuba diving, hiking or driving in the mountains, or exposure to a loud blast or explosion. Denies swimming in the last few weeks. No recent exposure to tobacco smoke, smoke from a fire or wood-burning stove, or air pollution. Denies regularly using hearing aids, earbuds or in-ear headphones, swim caps, cotton swabs, or ear canal irrigation kits.   Reports taking oral antibiotics for an ear infection 2 or more times in the past 12 months. Last antibiotic treatment for an ear infection was > 4 months ago.   Denies the following red flags:    Nuchal rigidity accompanied by fever    Current treatment by ENT    Current PE tubes in affected ear(s)    Mastoid or ear surgery in the last 5 years    Ear swelling, or mastoid redness, warmth, or pain    Sharp or pointed object  in ear canal    Foreign body in ear    Recent head trauma    Symptoms of periauricular cellulitis or perichondritis    Hearing loss in both ears   Pregnancy/menstrual status/breastfeeding:   Patient is postmenopausal.   Self-exam: Patient reports:    No difficulty moving their chin toward their chest    No ear swelling, or mastoid redness, warmth, or pain    Pain is unchanged by chewing or moving the jaw    Pain is unaffected by manipulation of the pinna and/or pressure on the tragus    Pain is worse when lying down   Current medications   Patient has used the following OTC medication(s) for their ear symptoms: acetaminophen and fluticasone.   Reports taking Allergy Relief (Fluticasone), Acetaminophen Arthritis Pain, estradiol 0.01 MG [Vagifem], Dexilant Pill, Centrum Adults, Osteo Bi-Flex Double Strength, ascorbic acid / collagen Oral Capsule, garlic allergenic extract 100 MG/ML, Super B Complex, and PANTHENOL/VITAMIN A/VITAMIN D/VITAMIN E.   Medication allergies    Fluoroquinolones    Ibuprofen   Medication contraindication review   Denies history of arrhythmia, benign prostatic hypertrophy, congestive heart failure, recent myocardial infarction, heart block, heart disease, hypertension, hyperthyroidism, mononucleosis, and myasthenia gravis.   No known history of amoxicillin-clavulanate-associated jaundice or hepatic impairment.   No known history of azithromycin-associated cholestatic jaundice or hepatic impairment.   Reports history positive for hypertension and influenza, varicella, or febrile viral infection. Therefore, acetaminophen-diphenhydramine-phenylephrine, aspirin-chlorpheniramine-phenylephrine, ibuprofen, levofloxacin, and naproxen will not be prescribed.   Past medical history   Immune conditions: Denies immunocompromising conditions. Denies history of cancer.   Assessment   Bacterial sinusitis.   Plan   Medications:    Augmentin 875 mg-125 mg tablet RX 875mg/125mg 1 tab PO bid 5d for infection. This  medication is an antibiotic. Take it exactly as directed. You must finish the entire course of medication, even if you feel better. Amount is 10 tab.   The patient's prescription will be sent to:   [id number]   [address] [facility] [id number]   Phone: [phone]     Fax: [phone]   Education:    Condition and causes    Prevention    Treatment and self-care    When to call provider      ----------   Electronically signed by CIARA Mccloud on 2021-10-19 at 13:02PM   ----------   Patient Interview Transcript:   How would you describe your ear pain or discomfort? Scroll to see all options. Select all that apply.    Achy    Pressure    Throbbing   Not selected:    Itchy    Blocked or plugged    Full    Crackling or popping    Shooting/stabbing/sharp   On a scale of 1 to 10, how severe is your ear pain? Select one.    Moderate (4 to 6); it's pretty uncomfortable   Not selected:    Mild (1 to 3); it bothers me a little bit    Moderate to severe (7 to 9); it's intense    Very severe; it's unbearable (10+)   Which ear is affected? Select one.    My left ear   Not selected:    My right ear    Both of my ears   When did you first notice this ear pain or discomfort? Select one.    Yesterday   Not selected:    Today    Within the last 3 days    Within the last 4 to 5 days    More than 5 days ago   Did your ear pain or discomfort come on suddenly or over time? Select one.    Suddenly   Not selected:    Over time    I'm not sure   Is the outside of the affected ear red, swollen, warm to the touch, or painful to the touch? Select all that apply.    None of these   Not selected:    Red    Swollen    Warm to the touch    Painful to the touch   Is your ear pain or discomfort always there, or does it come and go? Select one.    Always there   Not selected:    Comes and goes    I'm not sure   Does the pain or discomfort get worse when you bite or chew? Select one.    No   Not  selected:    Yes    I'm not sure   Along with your ear symptoms, have you had a fever or felt hot? Select one.    No   Not selected:    Yes   Do you have any of these on the same side as your affected ear?    None of the above   Not selected:    Pain or tenderness over the bone behind your ear    Redness over the bone behind your ear    Warmth over the bone behind your ear    Swelling of the ear itself   Do your symptoms include the sudden onset of ringing in your ear(s)? Select one.    No   Not selected:    Yes   Do your symptoms include hearing loss? Select one.    No   Not selected:    Yes, I can't hear as well as I usually do    Yes, I can't hear at all in either ear   Has there been drainage coming out of your ears? Select one.    No   Not selected:    Yes, but just the usual amount for me    Yes, more than the usual amount for me   Along with your ear symptoms, have you had any of these nasal symptoms? Select all that apply.    Stuffy nose (nasal congestion)    Postnasal drip    Sinus pain or pressure    Scratchy throat   Not selected:    Runny nose    Cough    None of the above   How long have you had these nasal or sinus symptoms? Select one.    3 to 5 days   Not selected:    Less than 48 hours    6 to 9 days    10 to 14 days    2 to 4 weeks    1 month or longer   Have your nasal or sinus symptoms improved at all since they began? Select one.    No   Not selected:    Yes, but they haven't gone away completely    Yes, but then they came back worse than before    I'm not sure   Along with your ear symptoms, do you have any of these throat or digestion symptoms? Select all that apply.    Sore throat or feeling of a lump in your throat    Hoarse voice   Not selected:    Burning feeling in your chest (heartburn)    Swallowed food or liquids getting stuck and coming back up    Difficulty swallowing    None of these   Along with your ear symptoms, have you had a headache? Select one.    Yes, and the pain is mild to  moderate   Not selected:    Yes, and the pain is severe    Yes, and the pain is unbearable    Yes, and it's the worst headache of my life    No   Along with your ear symptoms, have you had any vision changes? Select one.    No   Not selected:    Yes    I'm not sure   Along with your ear symptoms, have you felt dizzy or had vertigo? This includes feeling like you are spinning, swaying, or tilting; feeling light-headed; or feeling like the room is moving around you. Select one.    No   Not selected:    Yes   Do your symptoms include vomiting? Select one.    No   Not selected:    Yes   Can you move your chin toward your chest? Select one.    Yes   Not selected:    No, my neck is too stiff   Does your ear pain or discomfort get worse if you do either of these: 1. Pull the cartilage part of your ear up and back 2. Push on your tragus (the flesh in front of your ear opening)    No   Not selected:    Yes    I'm not sure   Take a moment and lie down. Does your ear pain or discomfort feel worse when you lie down? Select one.    Yes   Not selected:    No   Right before your symptoms started, did you put anything sharp or pointed into your ear canal? Select one.    No   Not selected:    Yes   Is it possible that you have something stuck in your ear canal? Examples include a bead, button, rock, or part of an earbud. Select one.    No   Not selected:    Yes   Right before your ear pain began, did you have a severe head or ear injury? Examples include: - A blow to your head or ear - Hitting your head or ear on a hard surface - Falling on your ear while skateboarding or skiing - A motor vehicle accident - A sports injury, such as in wrestling - Penetrating trauma, such as a knife wound Select one.    No   Not selected:    Yes   In the week before your symptoms started, did any of these apply to you? Select all that apply.    None of the above   Not selected:    Air travel    Scuba diving    Hiking or driving in the mountains     Exposed to a loud blast or explosion   In the few weeks before your symptoms started, did you go swimming or get water in your ear(s)? Select one.    No   Not selected:    Yes    I'm not sure   Have you recently been exposed to more smoke or air pollution than usual? Select all that apply.    None of the above   Not selected:    Yes, tobacco smoke    Yes, smoke from a fire or wood-burning stove    Yes, air pollution   Do you regularly use any of these items? Select all that apply.    None of the above   Not selected:    Hearing aids    Earbuds or in-ear headphones    Swim caps    Cotton swabs to clean your ears    Earwax removal devices, such as an irrigation kit   Are you being treated by an Ear, Nose and Throat (ENT) doctor for an ear condition? Select one.    No   Not selected:    Yes   Do you have ear tubes? Some other names for ear tubes are tympanostomy tubes, ventilation tubes, or pressure equalization (PE) tubes. Select one.    No   Not selected:    Yes, in my left ear only    Yes, in my right ear only    Yes, in both ears    No, but I've had them in the past    I'm not sure   In the past 5 years, have you had mastoid surgery or ear surgery (not including ear tube placement or removal)? Select one.    No   Not selected:    Yes    I'm not sure   When was the last time you were treated with antibiotics for an ear infection? This includes both oral antibiotics and antibiotic ear drops. Select one.    4 months to a year ago   Not selected:    Never    Within the last month    1 to 3 months ago    More than a year ago    I'm not sure   In the past year, how many times have you taken oral antibiotics for an ear infection? Select one.    2 or more   Not selected:    1    I'm not sure   Have you ever been diagnosed with heartburn, GERD (gastroesophageal reflux), or LPR (laryngopharyngeal reflux)? Select one.    Yes   Not selected:    No   Do you have any of these conditions that can affect the immune system? Scroll  to see all options. Select all that apply.    None of these   Not selected:    History of bone marrow transplant    Chronic kidney disease    Chronic liver disease (including cirrhosis)    HIV/AIDS    Inflammatory bowel disease (Crohn's disease or ulcerative colitis)    Lupus    Moderate to severe plaque psoriasis    Multiple sclerosis    Rheumatoid arthritis    Sickle cell anemia    Alpha or beta thalassemia    History of solid organ transplant (kidney, liver, or heart)    History of spleen removal    An autoimmune disorder not listed here    A condition requiring treatment with long-term use of oral steroids (such as prednisone, prednisolone, or dexamethasone)   Have you ever been diagnosed with cancer? Select one.    No   Not selected:    Yes, I have cancer now    Yes, but I'm in remission   Have you gone through menopause? Select one.    Yes   Not selected:    No   The next few questions help us figure out the best treatment for you. Have you used any of these non-prescription medications for your ear symptoms? Scroll to see all options. Select all that apply.    Acetaminophen (Tylenol)    Fluticasone (Flonase)   Not selected:    Carbamide peroxide otic (Auro, Debrox)    Cetirizine (Zyrtec)    Diphenhydramine (Benadryl)    Fexofenadine (Allegra)    Ibuprofen (Advil, Motrin, Midol)    Naproxen (Aleve)    Isopropyl alcohol in glycerin ear drops (Swim Ear drops)    Loratadine (Alavert, Claritin)    Oxymetazoline (Zicam)    Phenylephrine (Sudafed)    Triamcinolone (Nasacort)    None of these   Are you taking any other medications or supplements? On the next screen, you need to list all vitamins, supplements, non-prescription medications (such as aspirin or Aleve), and prescription medications that you're taking. Select one.    Yes   Not selected:    Yes, but I'm not sure what they are    No   Have you ever had an allergic or bad reaction to any medication? Select one.    Yes   Not selected:    No   Have you had an  allergic or bad reaction to any of these medications? Scroll to see all options. Select all that apply.    Ciprofloxacin, levofloxacin, moxifloxacin, or ofloxacin (Cipro, Floxin, Levaquin)   Not selected:    Cephalexin, cefazolin, cefdinir, cefuroxime, ceftriaxone, ceftazidime, or cefepime (Ancef, Ceftin, Fortaz, Keflex, Maxipime, Rocephin)    Corticosteroid medications, such as betamethasone, budesonide, dexamethasone, fluticasone, flunisolide, hydrocortisone, methylprednisolone, mometasone, prednisone, or prednisolone (AeroBid, Advair, Aerospan, Asmanex, Flovent, Pulmicort)    Cyclobenzaprine (Flexeril)    Azithromycin, clarithromycin, or erythromycin (Biaxin, Erythrocin, Pediazole, Zithromax)    Naproxen (Aleve)    Penicillin, amoxicillin, ampicillin, dicloxacillin, nafcillin, or piperacillin (Augmentin, Unasyn, Zosyn)    Doxycycline, minocycline, or tetracycline (Declomycin, Dynacin, Panmycin)    Fluconazole, itraconazole, or terconazole (Diflucan, Sporanox, Terazol)    None of these   Have you had an allergic or bad reaction to benzonatate (Tessalon Perles)? Select one.    No   Not selected:    Yes    I'm not sure   Have you had an allergic or bad reaction to any of these non-prescription medications? Scroll to see all options. Select all that apply.    Ibuprofen (Advil, Motrin, Midol)   Not selected:    Acetaminophen (Tylenol)    Aspirin    Carbamide peroxide (Auro, Debrox)    Cetirizine (Zyrtec)    Chlorpheniramine (Aller-chlor, Chlor-Trimeton)    Dextromethorphan (Delsym, Zicam)    Diphenhydramine (Benadryl)    Fexofenadine (Allegra)    Fluticasone (FLONASE Allergy Relief)    Guaifenesin (Mucinex, Robitussin)    Guaifenesin/dextromethorphan (Mucinex DM, Robitussin DM)    Isopropyl alcohol in glycerin ear drops (Swim Ear drops)    Loratadine (Alavert, Claritin)    Omeprazole (Prilosec)    Oxymetazoline (Zicam)    Pantoprazole (Protonix)    Phenylephrine (Sudafed PE)    Triamcinolone (Nasacort)    None of  these   Are you currently being treated for any of these conditions? Scroll to see all options. Select all that apply.    None of the above   Not selected:    Arrhythmia    Congestive heart failure    Recent heart attack    Heart block    Heart disease    Hypertension    Hyperthyroidism    Mononucleosis    Myasthenia gravis   Have you ever had jaundice or liver problems as a result of taking amoxicillin-clavulanate (Augmentin)? Select all that apply.    No, not that I know of   Not selected:    Yes, jaundice    Yes, liver problems   Have you ever had jaundice or liver problems as a result of taking azithromycin (Zithromax, Zmax)? Select all that apply.    No, not that I know of   Not selected:    Yes, jaundice    Yes, liver problems   Have you had any of these conditions? Scroll to see all options. Select all that apply.    High blood pressure    Influenza, chickenpox, or a viral infection with fever   Not selected:    Aspirin triad (also known as Samter's triad or ASA triad)    Asthma or hives from taking aspirin or other NSAIDs, such as ibuprofen or naproxen    Blockage or narrowing of the blood vessels of the heart    Coagulation disorder    Difficulty urinating or completely emptying your bladder    Electrolyte abnormalities    G6PD deficiency    Gastrointestinal (GI) bleeding    Recent coronary artery bypass graft (CABG) surgery    None of the above   Have you taken any monoamine oxidase inhibitor (MAOI) medications within the last 14 days? Examples include rasagiline (Azilect), selegiline (Eldepryl, Zelapar), isocarboxazid (Marplan), phenelzine (Nardil), and tranylcypromine (Parnate). Select one.    No   Not selected:    Yes    I'm not sure   Do you need a doctor's note? A doctor's note confirms that you received care today and states when you can return to school or work. It does not contain information about your diagnosis or treatment plan. Your provider will make the final decision on whether to give you a  doctor's note. Doctor's notes CANNOT be backdated. Select one.    No   Not selected:    Today only (1 day)    Today and tomorrow (2 days)    3 days   Is there anything else you'd like to tell us about your symptoms?    I believe that I have a sinus infection and possible ear infection. I frequently have this problem around this time of the year, They usually give me Augmentin and Prednisone and it works.   ----------   Medical history   Medical history data does not currently exist for this patient.

## 2021-10-19 NOTE — EXTERNAL PATIENT INSTRUCTIONS
Note   I will also send in steroids but have to order them from a different screen. You should have prednisone and augmentin to  at the pharmacy. I hope you feel better soon.   Diagnosis   Bacterial sinusitis   My name is Gracie Gruber, and I'm a healthcare provider at [facility]. I reviewed your interview, and I believe your ear symptoms are caused by a bacterial sinus infection.   Everyone 12 years of age and older can now get a COVID-19 vaccination.   If you haven't already gotten a COVID-19 vaccine, I encourage you to get one as soon as possible. COVID-19 vaccines are SAFE and EFFECTIVE. Getting vaccinated against COVID-19 is the best way to protect yourself, your loved ones, and your community.   Since December 2020, more than 357 million doses of the COVID-19 vaccine have been given in the [place]. Serious side effects are extremely rare, and no long-term side effects have been reported. If you still have questions or concerns about the COVID-19 vaccine, please speak with a healthcare provider.   For more information about how to get a COVID-19 vaccine, visit [facility]'s website. Or to find a COVID-19 vaccination site near you, call [phone], or text your zip code to 995637 ([employer]). Message and data rates may apply.   Medications   Your pharmacy   [id number] [address] [phone]     Prescription      Augmentin (875mg/125mg): Take 1 tablet by mouth twice a day for 5 days for infection. This medication is an antibiotic. Take it exactly as directed. You must finish the entire course of medication, even if you feel better.    Start taking the antibiotics I've prescribed right away. You need to finish the entire course of antibiotics, even if you start to feel better before the pills run out.   Some women develop yeast infections after taking antibiotics. If you develop a yeast infection, you can treat it with antifungal  creams or suppositories. These are available without a prescription at [employer] and many supermarkets.   About your diagnosis   The sinuses are hollow spaces connected to the nasal passages. Sinusitis occurs when the sinuses swell and block the drainage of fluid and mucus from the nose.   The middle ear is connected to the nose and sinuses by the eustachian tube. The eustachian tube keeps the air pressure inside the middle ear equal to the air pressure outside the body. During a sinus infection, the eustachian tube can get swollen, preventing it from keeping the pressure equal. The difference in pressure can cause ear pain as well as a blocked or plugged feeling in your ear. The good news is that as your sinus symptoms improve, so should your ear symptoms.   More than 90% of sinus infections are caused by viruses. However, in certain cases, a sinus infection may be caused by bacteria. Bacterial sinus infections usually look like one of the following cases:    Severe sinus symptoms with a fever over 102F.    Sinus symptoms that have not improved at all after 10 days.    Cold symptoms that slowly improve but then worsen again after 5 or 6 days, usually with a high fever, headache, or nasal discharge.   What to expect   If you follow this treatment plan, you should feel better within a few days.   You can return to your normal activities when ALL of the following are true:    You've been fever-free for more than 24 hours without using fever-reducing medications such as Tylenol    Your other symptoms have improved    It's been at least 10 days since your symptoms first started   When to seek care   Call us at [phone]   with any sudden or unexpected symptoms.    Symptoms that worsen despite treatment.    Hearing loss that worsens or doesn't improve.    New or bloody drainage from your ear.    Pain, tenderness, redness, or swelling on the bony part behind your ear along with a fever.    A fever over 103F or  lasting more than 4 days.    Sudden difficulty standing up or walking.   Other treatment    Rest! Your body needs rest to recover and fight infection.    Drink plenty of water.    Use steam to soothe your sinuses: Breathe it in from a shower or a bowl of hot water. Placing a warm, moist washcloth over your nose and forehead may help relieve the sinus pain and pressure.    Try non-prescription saline nasal sprays to help your nasal symptoms. Try using a Neti Pot to flush out your nose and sinuses. Neti Pots are available at any [facility] without a prescription.    Avoid smoke and air pollution. Smoke can make infections worse.   Prevention    Avoid close contact with other people when you're sick.    Cover your mouth and nose when you cough or sneeze. Use a tissue or cough into your elbow. Make sure that used tissues go directly into the trash.    Avoid touching your eyes, nose, or mouth while you're sick.    Wash your hands often, especially after coughing, sneezing, or blowing your nose. If soap and water are not available, use an alcohol-based hand .    If you or someone in your home or workplace is sick, disinfect commonly used items. This includes door handles, tables, computers, remotes, and pens.    Coronavirus (COVID-19) information   Coronavirus disease 2019, or COVID-19, is a virus strain spreading around the world. Common symptoms include fever, cough, shortness of breath, fatigue, muscle or body aches, headaches, new loss of sense of taste or smell, sore throat, stuffy or runny nose, nausea or vomiting, and diarrhea. Most people who get COVID-19 have mild symptoms and can rest at home until they get better. Elderly people and those with chronic medical problems may be at risk for more serious complications.   FAQs about the COVID-19 vaccine   There are three authorized vaccines to prevent COVID-19: [employer] Donaldo Vaccine (J&J/Donaldo), Moderna, and Pfizer-BioNTech (Pfizer). The  "J&J/Donaldo and Moderna vaccines are approved for use in people aged 18 and older. The Pfizer vaccine is approved for those aged 12 and older. All vaccines will be available at no cost.   Which vaccine is the best? Which vaccine should I get?   All three vaccines are highly effective against COVID-19. Even if you get COVID after being vaccinated, studies show that all of the vaccines prevent you from getting seriously ill, being hospitalized, and having complications from the disease.   Most people should get whichever vaccine is first available to them. However, women younger than 50 years old should consider the rare risk of blood clots with low platelets after vaccination with the J&J/Donaldo vaccine. This risk hasn't been seen with the other two vaccines.   Are the vaccines safe?   As part of Phase 3 clinical trials in the [place] and other countries, researchers collected safety and efficacy data for all three vaccines. These clinical trials must follow strict standards. Before a vaccine is approved, the manufacturing company must submit data to the Food and Drug Administration (FDA) for review. Tens of thousands of volunteers participated in the clinical trials for the vaccines. The FDA will continue to monitor information and data as the vaccines are given to the general population.   Millions of Americans have already safely received COVID-19 vaccines.   How many doses of the vaccine do I need?   The J&J/Donaldo vaccine only requires one dose.   The Moderna and Pfizer vaccines require two doses. If you get the Moderna vaccine, the two doses will be spaced 4 weeks apart. If you get the Pfizer vaccine, the two doses will be spaced 3 weeks apart.   How long after vaccination am I considered inoculated or \"immune\"?   Studies show that immunity to COVID-19 develops 14 days after vaccination. After the 14 days, you are considered \"fully vaccinated.\" For the two-dose vaccines (Moderna and Pfizer), this means 14 days " after your SECOND dose.   What happens if I forget to get or miss the second dose of the Moderna or Pfizer vaccine?   While one dose of the vaccine may provide some protection against COVID, you need both doses for maximum protection. If you miss the second dose, contact your healthcare provider to discuss your options.   What are the common side effects of the vaccine?   The most common side effects are similar to those of other routine vaccines: a sore arm, tiredness, headache, and muscle pain. Most side effects occur within two days of getting the vaccine and last about a day. For the Moderna or Pfizer vaccines, side effects are more common after the second dose of the vaccine. People over the age of 55 are less likely to have side effects than younger people.   After I'm fully vaccinated, can I still get COVID or spread COVID?   It's possible to get COVID after getting vaccinated. However, the disease should be milder, and your risk of serious illness, hospitalization, and complications will be much lower.   Being vaccinated against COVID-19 reduces the risk of spreading the disease if you get it.   Once you've been fully vaccinated:    You can return to many of the activities you did before the pandemic.    Wear a mask where required by laws, rules, regulations, or local guidance.    Wear a mask indoors in public if you're in an area of high transmission. This will reduce your risk of being infected with the Delta variant and possibly spreading it to others.    Wear a mask regardless of transmission level in your area if:    You have a weakened immune system.    Your age puts you at increased risk of severe disease.    You have a medical condition that puts you at increased risk of severe disease.    Someone in your household has a weakened immune system, is at increased risk for severe disease, or is unvaccinated.    You should get tested 3 to 5 days after you've been around someone who has COVID-19, even if  "you don't have symptoms. Wear a mask indoors in public for 14 days following exposure, or until you've confirmed your test result is negative. If you test positive for COVID-19, you should isolate at home for 10 days.    You can return to domestic travel and don't need to get COVID testing before or after travel or self-quarantine after travel.    You don't need to get COVID testing before leaving the [place] for international travel (unless required by the destination) or self-quarantine after arriving back in the [place].    You'll need to show a negative test result or documentation of recovery from COVID-19 before boarding an international flight to the [place].    You should get tested 3 to 5 days after international travel.   It'll take many months to get everyone vaccinated. To continue to protect yourself and others, you should still take the following steps, even after you've been fully vaccinated:    Get tested and stay away from others if you develop symptoms of COVID-19.    Don't visit private or public settings if you've tested positive for COVID-19 in the previous 10 days.    Continue to follow any applicable federal, state, local, [place], or territorial laws, rules, and regulations.   I'm fully vaccinated but have heard about a \"third dose\" and \"boosters.\" Do these apply to me?   Third dose   The CDC recommends a third dose of the vaccine for moderately to severely immunocompromised people. This is because their immune systems may not have responded well enough to the first two doses.   If you've already gotten 2 doses of the Moderna or Pfizer vaccine, you should get a third dose if:    You're getting active cancer treatment for tumors or cancers of the blood    You've had an organ transplant and are taking medicine to suppress your immune system    You've had a stem cell transplant within the last 2 years    You've had a stem cell transplant more than 2 years ago, but you're taking medicine to suppress your immune " system    You have moderate to severe primary immunodeficiency (such as DiGeorge syndrome, Wiskott-Berta syndrome)    You have advanced or untreated HIV infection    You're getting active treatment with high-dose corticosteroids or other drugs that may suppress your immune system   This recommendation does NOT apply to people who got the J&J/Donaldo vaccine.   Booster shot   The CDC recommends a booster shot of the Pfizer vaccine for certain groups of people. This is because their immunity may have decreased over time.   If you've already gotten 2 doses of the Pfizer vaccine AND it's been at least 6 months since your last dose, you SHOULD get a booster shot if:    You're 65 years old or older    You live in a long-term care facility    You're 50 to 64 years old and have an underlying medical condition, such as:    Cancer    Chronic kidney disease    Chronic lung disease, including COPD, moderate to severe asthma, interstitial lung disease, cystic fibrosis, and pulmonary hypertension    Dementia or other neurological condition    Diabetes    Down syndrome    Heart condition, including heart failure, coronary artery disease, cardiomyopathies, or hypertension    HIV infection    A weakened immune system    Chronic liver disease    Obesity    Pregnancy    Sickle cell disease or thalassemia    Smoking, current or former    Solid organ or blood stem cell transplant    Stroke or cerebrovascular disease    Substance use disorder   If you've already gotten 2 doses of the Pfizer vaccine AND it has been at least 6 months since your last dose, you MAY need a booster shot if:    You're 18 to 64 years old and your job or living situation puts you at increased risk of COVID-19 exposure and transmission    You're 18 to 49 years old and have an underlying medical condition, such as:    Cancer    Chronic kidney disease    Chronic lung disease, including COPD, moderate to severe asthma, interstitial lung disease, cystic fibrosis,  and pulmonary hypertension    Dementia or other neurological condition    Diabetes    Down syndrome    Heart condition, including heart failure, coronary artery disease, cardiomyopathies, or hypertension    HIV infection    A weakened immune system    Chronic liver disease    Obesity    Pregnancy    Sickle cell disease or thalassemia    Smoking, current or former    Solid organ or blood stem cell transplant    Stroke or cerebrovascular disease    Substance use disorder   If you think you need a booster shot, speak with your care team.   The booster shot recommendations DO NOT apply to people who got the Moderna vaccine or J&J/Donaldo vaccine.   Do I need the vaccine if I've already had COVID?   Yes, you should get the vaccine even if you've already had COVID-19.   People who recently had COVID-19 can choose to wait 90 days after their illness to get vaccinated. It's uncommon to get reinfected with COVID-19 within 90 days after an infection.   People who currently have COVID-19 should wait to get vaccinated until they feel better and their isolation period is finished.   To prevent spreading the disease, people who were recently exposed to COVID-19 should wait to get the vaccine until after their quarantine period.   General information about COVID-19   What should I do if I'm exposed to someone with COVID-19?   In general, you need to be in close contact with someone who has COVID-19 to get infected. Close contact means:    Living in the same household as someone with COVID-19.    Caring for someone with COVID-19.    Being within 6 feet of someone with COVID-19 for a total of at least 15 minutes over a 24-hour period. For example, three 5-minute exposures for a total of 15 minutes.    Being in direct contact with respiratory droplets from someone with COVID-19 (for example, being coughed on, kissing, or sharing utensils).   If you're exposed and not fully vaccinated:    Self-quarantine in your home for the 14 days  "after your last known contact with the infected person. Because you can spread the disease before you have symptoms, it's very important that you stay home AT ALL TIMES, unless you need medical care. Don't go to work, school, or public places, including grocery stores and pharmacies. Avoid public transportation, ride-sharing, and taxis.    Watch for the common symptoms of COVID-19: fever, cough, shortness of breath, fatigue, muscle or body aches, headache, new loss of sense of taste or smell, sore throat, stuffy or runny nose, nausea or vomiting, and diarrhea.   If you're exposed and fully vaccinated:    You should still get tested 3 to 5 days after exposure even if you don't have symptoms. Wear a mask indoors in public for 14 days following exposure, or until you've confirmed your test result is negative. If you test positive for COVID-19, you should isolate at home for 10 days. \"Fully vaccinated\" means it's been at least 2 weeks since you received your second dose of the vaccine.   What should I do if I develop symptoms of COVID-19?   If you have any of the following risk factors, CALL your healthcare provider right away to discuss next steps:    Age 65 or older    Pregnant    Chronic medical condition such as diabetes, liver disease, kidney disease requiring dialysis, heart disease, high blood pressure, severe obesity, or lung disease (including moderate to severe asthma)    A medical condition that affects your immune system    Taking a medication that affects your immune system   If you're otherwise healthy and your symptoms are mild, you don't need to call your provider or be seen for an exam. You can recover at home and should feel better within a few weeks. Because COVID-19 is highly contagious, it's important that you avoid close contact with others while you're recovering. This means staying home AT ALL TIMES, unless you need medical care. Don't go to work, school, or public places, including grocery " stores and pharmacies. Avoid public transportation, ride-sharing, and taxis.   There are currently no specific medications to treat this infection. Common over-the-counter cold medications can help ease symptoms.   To prevent the spread of COVID-19 to the people and animals in your household:    Stay in a specific room away from other people in your home, and use a separate bathroom.    Wear a mask when close contact with household members can't be avoided.    Limit contact with pets and other animals. When possible, have someone else care for your pets. If you must care for your pets, wash your hands before and after you interact with them and wear a face mask.    Cover your mouth and nose with a tissue when you cough or sneeze. Throw used tissues away in a lined trash can, then wash your hands immediately.   You can return to your normal activities outside the home when ALL of the following are true:    You've been fever-free for more than 24 hours without using fever-reducing medications such as Tylenol    Your symptoms have improved    It's been at least 10 days since your symptoms first started   When to get care   Call your healthcare provider immediately if you have any of the following:    Fever over 103F    Fever that doesn't come down after taking medications such as Tylenol or ibuprofen    Fever that returns after being gone for more than 24 hours    Fever lasting more than 4 days    Worsening shortness of breath or difficulty breathing   Go to your nearest ER or call 911 if you have any of the following:    Shortness of breath that makes it difficult to do simple things like get dressed, bathe, or comb your hair    Persistent chest pain or chest tightness    New confusion or difficulty staying alert    Bluish color to the lips or face   How can I help prevent the spread of COVID-19 even if I'm not sick?   The best way to help prevent the spread of COVID-19 is to get vaccinated. COVID-19 vaccines are safe  and effective. Everyone 12 years of age and older is now eligible to get a COVID-19 vaccination.   Until you're fully vaccinated, you'll need to continue to do the following when in public places:    Stay at least 6 feet away from other people    Wear a face mask at all times, even if you don't have symptoms    Avoid touching high-touch surfaces, such as elevator buttons, door handles, and handrails. Use a tissue or your sleeve to cover your hand or finger if you must touch something.    Avoid shaking hands with people    Avoid crowds, especially in poorly ventilated indoor spaces   Hand and home hygiene:    Wash your hands often with soap and water for at least 20 seconds. If soap and water aren't available, use an alcohol-based  that contains at least 60% alcohol, covering all surfaces of your hands and rubbing them together until they feel dry.    Avoid touching your eyes, nose, or mouth.    Avoid sharing personal household items such as dishes, drinking glasses, cups, utensils, towels, or bedding. Wash items thoroughly with soap and water after use.    Clean high-touch surfaces daily. High-touch surfaces include counters, tabletops, doorknobs, bathroom fixtures, toilets, phones, keyboards, tablets, and bedside tables. Use soap, detergents, 60 to 80% ethanol or isopropyl alcohol,  such as Windex, or bleach. All of these  are effective at killing the virus that causes COVID-19.    Flu vaccine information   Getting a flu vaccine this year is more important than ever. The vaccine not only protects you and the people around you from the flu, it also helps reduce the strain on healthcare systems responding to the COVID-19 pandemic.   Flu shots usually become available in early September.   Who should get a flu vaccine?   Everyone 6 months of age and older should get a yearly flu vaccine.   When should I get vaccinated?   You should get a flu vaccine by the end of October. Once you're  vaccinated, it takes about two weeks for antibodies to develop and protect you against the flu. That's why it's important to get vaccinated as soon as possible.   After October, is it too late to get vaccinated?   No. You should still get vaccinated. As long as the flu viruses are still in your community, flu vaccines will remain available, even into January of next year or later.   Why do I need a flu vaccine EVERY year?   Flu viruses are constantly changing, so flu vaccines are usually updated from one season to the next. Your protection from the flu vaccine also lessens over time.   Is the flu vaccine safe?   Yes. Over the last 50 years, hundreds of millions of Americans have safely received the flu vaccines.   What are the side effects of flu vaccines?   You CANNOT get the flu from a flu vaccine. Common side effects of the flu shot include soreness, redness and/or swelling where the shot was given, low grade fever, and aches. Common side effects of the nasal spray flu vaccine for adults include runny nose, headaches, sore throat, and cough. For children, side effects include wheezing, vomiting, muscles aches, and fever.   Does the flu vaccine increase your risk of getting COVID-19?   No. There is no evidence that getting a flu vaccine increases your risk of getting COVID-19.   Is it safe to get the flu vaccine along with a COVID-19 vaccine?   Yes. It's safe to get the flu vaccine with a COVID vaccine or booster.   Contact your healthcare provider TODAY for details on when and where to get your flu vaccine.   Your provider   Your diagnosis was provided by Gracie Gruber, a member of your trusted care team at [facility].   If you have any questions, call us at [phone]  .

## 2021-12-02 ENCOUNTER — OFFICE VISIT (OUTPATIENT)
Dept: INTERNAL MEDICINE | Facility: CLINIC | Age: 71
End: 2021-12-02

## 2021-12-02 VITALS
WEIGHT: 242.4 LBS | BODY MASS INDEX: 36.74 KG/M2 | DIASTOLIC BLOOD PRESSURE: 78 MMHG | SYSTOLIC BLOOD PRESSURE: 132 MMHG | TEMPERATURE: 97.8 F | HEIGHT: 68 IN | OXYGEN SATURATION: 97 % | HEART RATE: 80 BPM

## 2021-12-02 DIAGNOSIS — E66.01 SEVERE OBESITY WITH BODY MASS INDEX (BMI) OF 35.0 TO 39.9 WITH COMORBIDITY (HCC): ICD-10-CM

## 2021-12-02 DIAGNOSIS — I10 ESSENTIAL HYPERTENSION: Primary | Chronic | ICD-10-CM

## 2021-12-02 DIAGNOSIS — E78.2 MIXED HYPERLIPIDEMIA: Chronic | ICD-10-CM

## 2021-12-02 DIAGNOSIS — M54.50 CHRONIC MIDLINE LOW BACK PAIN WITHOUT SCIATICA: Chronic | ICD-10-CM

## 2021-12-02 DIAGNOSIS — R79.89 ELEVATED SERUM CREATININE: ICD-10-CM

## 2021-12-02 DIAGNOSIS — G89.4 CHRONIC PAIN SYNDROME: Chronic | ICD-10-CM

## 2021-12-02 DIAGNOSIS — G89.29 CHRONIC MIDLINE LOW BACK PAIN WITHOUT SCIATICA: Chronic | ICD-10-CM

## 2021-12-02 DIAGNOSIS — N95.1 VAGINAL DRYNESS, MENOPAUSAL: ICD-10-CM

## 2021-12-02 PROBLEM — E66.9 OBESITY (BMI 30-39.9): Status: RESOLVED | Noted: 2020-03-09 | Resolved: 2021-12-02

## 2021-12-02 PROCEDURE — 99214 OFFICE O/P EST MOD 30 MIN: CPT | Performed by: STUDENT IN AN ORGANIZED HEALTH CARE EDUCATION/TRAINING PROGRAM

## 2021-12-02 PROCEDURE — G0008 ADMIN INFLUENZA VIRUS VAC: HCPCS | Performed by: STUDENT IN AN ORGANIZED HEALTH CARE EDUCATION/TRAINING PROGRAM

## 2021-12-02 PROCEDURE — 90662 IIV NO PRSV INCREASED AG IM: CPT | Performed by: STUDENT IN AN ORGANIZED HEALTH CARE EDUCATION/TRAINING PROGRAM

## 2021-12-02 RX ORDER — ESTRADIOL 0.1 MG/G
CREAM VAGINAL AS NEEDED
COMMUNITY
End: 2022-02-08 | Stop reason: ALTCHOICE

## 2021-12-02 RX ORDER — HYDROCHLOROTHIAZIDE 12.5 MG/1
12.5 TABLET ORAL DAILY
COMMUNITY
Start: 2021-11-30 | End: 2021-12-02 | Stop reason: SDUPTHER

## 2021-12-02 RX ORDER — HYDROCHLOROTHIAZIDE 12.5 MG/1
12.5 TABLET ORAL DAILY
Qty: 90 TABLET | Refills: 1 | Status: SHIPPED | OUTPATIENT
Start: 2021-12-02 | End: 2022-06-07

## 2021-12-02 NOTE — PROGRESS NOTES
"Chief Complaint  Keisha Malagon is a 71 y.o. female presenting for Med Refill and Back Pain.     Patient has a past medical history of hypertension, gastritis, overactive bladder and pelvic floor dysfunction, hyperlipidemia, allergic rhinitis, morbid obesity, osteoarthritis, chronic low back pain and SULAIMAN on CPAP.    History of Present Illness  Patient is here for follow-up of her hypertension.    She is compliant on her hydrochlorothiazide, has been on this for a long time.  Her home blood pressures are typically around 130/80.  Never up to the 140/90 Eric.    Patient also has been following with Ortho/back surgeon for chronic lower back pain.  They have been giving her injections.  She did an MRI at [facility] about 2 years ago.  She has been following with his therapist and they are focusing on her lower back, they also have been helping her with right iliotibial band pain that has gotten better.  She states her pain comes and goes.  No lower extremity weakness or numbness.    The following portions of the patient's history were reviewed and updated as appropriate: allergies, current medications, past family history, past medical history, past social history, past surgical history and problem list.    Objective  /78 (BP Location: Left arm, Patient Position: Sitting, Cuff Size: Large Adult)   Pulse 80   Temp 97.8 °F (36.6 °C) (Temporal)   Ht 172.7 cm (67.99\")   Wt 110 kg (242 lb 6.4 oz)   SpO2 97%   BMI 36.87 kg/m²     Physical Exam  Vitals reviewed.   Constitutional:       Appearance: Normal appearance.   HENT:      Head: Normocephalic and atraumatic.      Nose: Nose normal. No congestion.      Mouth/Throat:      Mouth: Mucous membranes are moist.   Eyes:      Extraocular Movements: Extraocular movements intact.      Conjunctiva/sclera: Conjunctivae normal.   Cardiovascular:      Rate and Rhythm: Normal rate and regular rhythm.      Heart sounds: Normal heart sounds. No murmur " heard.      Pulmonary:      Effort: Pulmonary effort is normal.      Breath sounds: Normal breath sounds.   Abdominal:      General: There is no distension.      Palpations: Abdomen is soft. There is no mass.      Tenderness: There is no abdominal tenderness.   Musculoskeletal:      Cervical back: Neck supple.      Right lower leg: No edema.      Left lower leg: No edema.   Skin:     General: Skin is warm and dry.   Neurological:      Mental Status: She is alert and oriented to person, place, and time. Mental status is at baseline.      Gait: Gait normal.      Comments: Patient is able to get on and off exam table without assistance, but is endorsing some pain when moving.  No weakness.   Psychiatric:         Behavior: Behavior normal.         Thought Content: Thought content normal.         Assessment/Plan   1. Essential hypertension  2. Elevated serum creatinine  BP Readings from Last 3 Encounters:   12/02/21 132/78   06/04/21 142/73   05/14/21 126/80   Blood pressure is currently at goal.  Continue current regimen.  Home blood pressures reassuring.  Will check CMP due to her borderline kidney function.  Also had borderline elevated liver enzymes.  - hydroCHLOROthiazide (HYDRODIURIL) 12.5 MG tablet; Take 1 tablet by mouth Daily.  Dispense: 90 tablet; Refill: 1  - Comprehensive Metabolic Panel; Future    3. Chronic midline low back pain without sciatica  4. Chronic pain syndrome  Stable.  No concern for radiculopathy or myelopathy.  Patient will continue follow-up with Ortho and PT.  He is asking for possible steroid taper if she would have a flareup of her back.  I have explained she should cut back in to discuss this if needed.    5. Vaginal dryness, menopausal  Long-term use for vaginal dryness.  - Estradiol 10 MCG insert; Insert 1 Units into the vagina Every 3 (Three) Days.  Dispense: 30 each; Refill: 1    6. Mixed hyperlipidemia  Recheck lipids in March.    7. Severe obesity with body mass index (BMI) of 35.0  to 39.9 with comorbidity (HCC)  Patient's Body mass index is 36.87 kg/m². indicating that she is morbidly obese (BMI > 40 or > 35 with obesity - related health condition). Obesity-related health conditions include the following: obstructive sleep apnea, hypertension, dyslipidemias and osteoarthritis. Obesity is unchanged. BMI is is above average; BMI management plan is completed. We discussed portion control and increasing exercise..        Return in about 2 months (around 2/2/2022) for Medicare Wellness.    Future Appointments       Provider Department Center    2/4/2022 10:45 AM Luis Ma MD [facility] INTERNAL MEDIC[facility]          Luis Ma MD  Family Medicine  12/02/2021    Answers for HPI/ROS submitted by the patient on 12/1/2021  What is the primary reason for your visit?: High Blood Pressure

## 2021-12-28 ENCOUNTER — E-VISIT (OUTPATIENT)
Dept: FAMILY MEDICINE CLINIC | Facility: TELEHEALTH | Age: 71
End: 2021-12-28

## 2021-12-28 PROCEDURE — BRIGHTMDVISIT: Performed by: NURSE PRACTITIONER

## 2021-12-28 RX ORDER — PREDNISONE 20 MG/1
20 TABLET ORAL 2 TIMES DAILY
Qty: 6 TABLET | Refills: 0 | Status: SHIPPED | OUTPATIENT
Start: 2021-12-28 | End: 2021-12-31

## 2021-12-29 NOTE — E-VISIT TREATED
Chief Complaint: Coronavirus (COVID-19), cold, sinus pain, allergy, or flu   Patient introduction   Patient is 71-year-old female who reports congestion, rhinitis, sore throat, and headache that started 6-9 days ago.   Patient has not requested COVID testing.   Coronavirus Disease 2019 (COVID-19) exposure, testing history, and vaccination status:    No known exposure to a confirmed or suspected case of COVID-19.    Recent travel outside of their local community.    Patient had a viral test 7-14 days ago. Test result was negative.    Reports receiving 3 doses.    Received the Moderna vaccine for the first dose.    Received the Moderna vaccine for the second dose.    Received the Moderna vaccine for the third dose.    Received their most recent dose of the vaccine > 14 days ago.   Warning. The following may warrant further investigation:    Recent travel outside of their local community    Age 65 or older    Hypertension    BMI of 30 to 39   When asked why they're seeking care online today, patient reports they want a specific treatment or medication and just want to feel better. Patient writes: augmentin, prednisone   Patient-submitted comments:   Patient writes: I take medication for high blood pressure and my high blood pressure is under control.   Patient did not request an excuse note.   General presentation   Patient denies improvement of symptoms. Symptoms came on gradually.   Fever:    Denies fever.   Sinus and nasal symptoms:    Reports rhinitis.    Reports yellow nasal drainage.    Nasal drainage is thick.    Reports postnasal drip.    Reports 1-3 episodes of antibiotic treatment for sinus infection in the last year.    Reports congestion with sinus pain or pressure on or around their forehead, eyes, and nose.    Patient first noticed sinus pain 5-9 days ago.    Sinus pain is worse with Valsalva.    Denies itchy nose or sneezing.    Denies history of unhealed nasal septal ulcer/nasal wound.    Denies  history of deviated septum or nasal polyps.   Sore throat:    Reports sore throat.    Reports previous tonsillectomy.    Patient does not think they have strep.    Patient is able to swallow liquids and solid foods with ease.   Head and body aches:    Reports headache, described as mild (1-3 on a scale of 1-10).    Denies sweats.    Denies chills.    Denies myalgia.    Denies fatigue.   Dizziness:    Reports mild dizziness that does not interfere with daily activities.   Cough:    Denies cough.   Wheezing and SOB:    Denies COPD diagnosis.    Denies asthma diagnosis.    Denies wheezing.    Denies shortness of breath.    Denies previous albuterol inhaler use during URIs, bronchitis, or pneumonia.    Denies previous steroid inhaler use during URIs, bronchitis, or pneumonia.   Chest pain:    Denies chest pain.   Allergies:    Reports history of allergies.    Patient does not think symptoms are allergy-related.    Patient has known seasonal allergies and known dust allergies.   Flu exposure:    Reports a flu vaccine in the last 1-3 months.   Patient denies the following red flags:    Changes in alertness or awareness    Symptoms suggesting airway obstruction    Symptoms suggesting intracranial hemorrhage    Decreased urination   Risk factors for antibiotic resistance:    Close contact with a child in    Pregnancy/menstrual status/breastfeeding:    Patient is postmenopausal   Self-exam:    No difficulty moving their chin toward their chest    No palatal petechiae    Neck lymph nodes feel normal   Denies antibiotic treatment for similar symptoms within the past month.   Current medications   Reports taking over-the-counter medication for current symptoms. Patient has taken acetaminophen and fluticasone.   Reports taking Osteo Bi-Flex Double Strength, Centrum Adults, estradiol Vaginal Insert [Vagifem], Dexilant Pill, Allergy Relief (Fluticasone), ALOE/VITAMIN A/VITAMIN D/VITAMIN E, and Super B Complex.   Medication  allergies    Azelastine    Fluoroquinolones   Medication contraindication review   Reports history positive for hypertension and arrhythmia. Therefore, the following medication(s) will not be prescribed:    Levofloxacin    Metoclopramide    Moxifloxacin    Acetaminophen-diphenhydramine-phenylephrine    Aspirin-chlorpheniramine-phenylephrine   Denies history of metoclopramide-associated dystonic reaction and tardive dyskinesia.   No known history of amoxicillin-clavulanate-associated cholestatic jaundice or hepatic impairment.   No known history of azithromycin-associated cholestatic jaundice or hepatic impairment.   Past medical history   Immune conditions: Denies immunocompromising conditions. Denies history of cancer.   Social history   Non-smoker.   Assessment   Bacterial sinusitis. Ruled out: Traumatic laryngitis.   This is the likely diagnosis based on patient's interview responses, including:    Congestion or sinus pressure    Thick nasal discharge    Yellow or green mucus    Duration of symptoms > 5 days    No improvement of symptoms   HHS required information for COVID-19 lab data reporting (if test is ordered):   Symptoms:    Headache    Sore throat    Nasal congestion    Rhinitis   Symptom onset: 6-9 days ago ago    Healthcare worker? No  Resident in a congregate care setting? No  Previous history of COVID-19 testing: Patient had a viral test 7-14 days ago. Test result was negative.     Plan   Medications:    amoxicillin 875 mg-potassium clavulanate 125 mg tablet RX 875mg/125mg 1 tab PO bid 10d for infection. This medication is an antibiotic. Take it exactly as directed. You must finish the entire course of medication, even if you feel better after the first few days of treatment. Amount is 20 tab.   The patient's prescription will be sent to:   [id number]   [address] [id number]   Phone: [phone]     Fax: [phone]   Education:    Condition and causes     Prevention    Treatment and self-care    When to call provider      ----------   Electronically signed by CIARA Mccloud on 2021-12-28 at 20:43PM   ----------   Patient Interview Transcript:   Why are you getting care through eVisit today? We can't guarantee a specific treatment or test. Your provider will decide what's best for you. Select all that apply.    I want a specific treatment or medication    I just want to feel better!   Not selected:    I want to know if I have a cold or something more serious    I want to know if I need to be seen by a provider    I need a doctor's note    I want to be tested for COVID-19    I want to get the COVID-19 vaccine    I think I'm having side effects from the COVID-19 vaccine    None of the above   Tell us which specific treatment or medication you'd like. Your provider will make the final decision on which treatment is best for your condition.    augmentin, prednisone   Which of these symptoms are bothering you? Select all that apply.    Stuffed-up nose or sinuses    Runny nose    Sore throat    Headache   Not selected:    Cough    Shortness of breath    Fever    Itchy or watery eyes    Itchy nose or sneezing    Loss of smell or taste    Hoarse voice or loss of voice    Sweats    Chills    Muscle or body aches    Fatigue or tiredness    Nausea or vomiting    Diarrhea    I don't have any of these symptoms   Before we learn more about why you're here, we'll get some information related to COVID-19. We'll ask about risk factors, testing, vaccination status, and exposure. Do you have any of these conditions? If so, you may be at increased risk for complications from COVID-19. Select all that apply.    None of the above   Not selected:    Chronic lung disease, such as cystic fibrosis or interstitial fibrosis    Heart disease, such as congenital heart disease, congestive heart failure, or coronary artery disease    Disorder of the brain, spinal cord, or nerves and  muscles, such as dementia, cerebral palsy, epilepsy, muscular dystrophy, or developmental delay    Metabolic disorder or mitochondrial disease    Cerebrovascular disease, such as stroke or another condition affecting the blood vessels or blood supply to the brain   Do you live in a group care setting? Examples include: - Nursing home - Residential care - Psychiatric treatment facility - Group home - [facility] - Board and care home - Homeless shelter - Foster care setting Select one.    No   Not selected:    Yes   Have you ever been tested for COVID-19? Select one.    Yes   Not selected:    No   When was your most recent COVID-19 test? Select one.    7 to 14 days ago   Not selected:    Within the last week    15 to 30 days ago    1 to 3 months ago    More than 3 months ago   What type of COVID-19 test did you have? There are 2 types of COVID-19 tests: - Viral tests check if you're currently infected with COVID-19. - Antibody tests check if you've been infected in the past. Select one.    Viral test for current infection   Not selected:    Antibody test for past infection   What was the result of your most recent COVID-19 test? Select one.    Negative (no sign of infection)   Not selected:    Positive (signs of current or past infection)    I'm not sure   Have you gotten the COVID-19 vaccine? Select one.    Yes   Not selected:    No   How many doses of the COVID-19 vaccine have you gotten? This includes boosters. Select one.    3 doses   Not selected:    1 dose    2 doses   Which COVID-19 vaccine did you get for your first dose? Check your Vaccination Record Card under Product Name/. Select one.    Moderna   Not selected:    Pietro & CredSimple's Samares Vaccine (J&J/Samares)    Pfizer-BioNTech (Pfizer)   Which COVID-19 vaccine did you get for your second dose? Check your Vaccination Record Card under Product Name/. Select one.    Moderna   Not selected:    Pietro & Pietro's Donaldo Vaccine  "(J&J/Donaldo)    Pfizer-[email or web address] (Pfizer)   Which COVID-19 vaccine did you get for your third dose? Check your Vaccination Record Card under Product Name/. Select one.    Moderna   Not selected:    Pietro & Pietro's Donaldo Vaccine (J&J/Donaldo)    Pfizer-[email or web address] (Pfizer)   When did you get your most recent dose of the COVID-19 vaccine?    More than 14 days ago   Not selected:    Less than 48 hours (2 days) ago    48 to 72 hours (3 days) ago    3 to 5 days ago    5 to 7 days ago    7 to 14 days ago   In the last 14 days, have you traveled outside of your local community? This includes travel by car, RV, bus, train, or plane. Travel increases your chances of getting and spreading COVID-19. Select one.    Yes   Not selected:    No   In the last 14 days, have you had close contact with someone who has coronavirus (COVID-19)? \"Close contact\" means any of these: - Living in the same household as someone with COVID-19. - Caring for someone with COVID-19. - Being within 6 feet of someone with COVID-19 for a total of at least 15 minutes over a 24-hour period. For example, three 5-minute exposures for a total of 15 minutes. - Being in direct contact with respiratory droplets from someone with COVID-19 (being coughed on, kissing, sharing utensils). Select one.    No, not that I know of   Not selected:    Yes, a confirmed case    Yes, a suspected case   Thanks for completing our COVID-19 questions. Now we'll return to your symptoms. When did your symptoms start? If you know the exact date your symptoms started, choose Other and enter the month and day. Select one.    6 to 9 days ago   Not selected:    Less than 48 hours ago    3 to 5 days ago    10 to 14 days ago    2 to 4 weeks ago    More than a month ago    Other (specify)   Did your symptoms come on suddenly or gradually? Select one.    Gradually   Not selected:    Suddenly    I'm not sure   Have your symptoms improved at all since they began? Select one.   " " No   Not selected:    Yes, but they haven't gone away completely    Yes, but then they came back worse than before    I'm not sure   You mentioned having a headache. On a scale of 1 to 10, how severe is your headache pain? Select one.    Mild (1 to 3)   Not selected:    Moderate (4 to 6)    Severe (7 to 9)    Unbearable (10)    The worst headache of my life (10+)   You mentioned having a stuffy nose or sinus congestion. Do you feel pain or pressure in your sinuses?    Yes   Not selected:    No    I'm not sure   Where do you feel sinus pain or pressure?    In my forehead    Around my eyes    Behind my nose   Not selected:    In my cheeks    In my upper teeth or jaw    I'm not sure   When did you first notice your sinus pain or pressure? Select one.    5 to 9 days ago   Not selected:    Less than 5 days ago    10 to 14 days ago    2 to 4 weeks ago    1 month ago or longer   Does coughing, sneezing, or leaning forward make your sinuses feel worse? Select one.    Yes   Not selected:    No    I'm not sure   What color is your nasal drainage? Select one.    Yellow   Not selected:    Clear    White    Green    My nose is stuffed but not draining or running    I'm not sure   Is your nasal drainage thick or thin? Select one.    Thick   Not selected:    Thin    I'm not sure   Is there any drainage (mucus) going down the back of your throat? This kind of drainage is also called \"postnasal drip.\" Select one.    Yes   Not selected:    No    I'm not sure   Can you swallow liquids and solid foods? A sore throat may be painful when swallowing, but it shouldn't prevent you from swallowing. Select one.    Yes, with ease   Not selected:    Yes, but it's uncomfortable    Yes, but it's painful    It's hard to swallow anything because it feels like liquids and food get stuck in my throat    No, I can't swallow anything, liquid or solid foods   Since your symptoms started, have you felt dizzy? Select one.    Yes, but I can continue with " my regular daily activities   Not selected:    Yes, and it makes it hard to stand, walk, or do daily activities    No   Do you have chest pain? You might also feel it as discomfort, aching, tightness, or squeezing in the chest. Select one.    No   Not selected:    Yes   Have you urinated at least 3 times in the last 24 hours? Select one.    Yes   Not selected:    No    I'm not sure   Changes in alertness or awareness may mean you need emergency care. Since your symptoms started, have you had any of these? Select all that apply.    None of the above   Not selected:    Confusion    Slurred speech    Not knowing where you are or what day it is    Difficulty staying conscious    Fainting or passing out   Do your symptoms include a whistling sound, or wheezing, when you breathe? Select one.    No   Not selected:    Yes    I'm not sure   Do you have any of these symptoms in your ear(s)? Select all that apply.    Pain    Pressure    Fullness    Crackling or popping    Plugged or blocked sensation   Not selected:    None of the above   Can you move your chin toward your chest?    Yes   Not selected:    No, my neck is too stiff   Are your tonsils larger than usual?    I've had my tonsils removed   Not selected:    Yes    No    I'm not sure   Are there red spots on the roof of your mouth or the back of your throat?    No   Not selected:    Yes    I'm not sure   Are your glands/lymph nodes swollen, or does it hurt when you touch them?    No   Not selected:    Yes    I'm not sure   People with a very high body mass index (BMI) are at higher risk for developing complications from the flu and severe illness from COVID-19. To determine your BMI, we need to know your weight and height. Please enter your weight (in pounds).    Weight   Please enter your height.    Height   In the past 2 weeks, has anyone around you (such as at school, work, or home) had a confirmed diagnosis of strep throat? A confirmed diagnosis means that a throat  swab and lab test were done to verify a strep throat infection. Select one.    I'm not sure   Not selected:    Yes    No   Do you think you might have strep throat? Select one.    No   Not selected:    Yes    I'm not sure   In the past week, has anyone around you (such as at school, work, or home) had a confirmed diagnosis of the flu? A confirmed diagnosis means that a nose swab was done to verify a flu infection. Select all that apply.    I'm not sure   Not selected:    I live with someone who has the flu    I've been within touching distance of someone who has the flu    I've walked by, or sat about 3 feet away from, someone who has the flu    I've been in the same building as someone who has the flu    No   Have you ever been diagnosed with asthma? Select one.    No   Not selected:    Yes   Have you ever been prescribed albuterol to use for wheezing, cough, or shortness of breath caused by a cold, bronchitis, or pneumonia? Albuterol (ProAir, Proventil, Ventolin) is prescribed as an inhaler or a solution to be used with a nebulizer machine. Select one.    No   Not selected:    Yes    I'm not sure   Have you ever been prescribed a steroid inhaler to use for wheezing, cough, or shortness of breath caused by a cold, bronchitis, or pneumonia? Some examples of steroid inhalers include Pulmicort, Flovent, Qvar, and Alvesco. Select one.    No   Not selected:    Yes    I'm not sure   Have you ever been diagnosed with chronic obstructive pulmonary disease (COPD)? Select one.    No   Not selected:    Yes    I'm not sure   In the last year, how many times were you treated with antibiotics for a sinus infection? Select one.    1 to 3 times   Not selected:    None    4 or more times   Have you been diagnosed with a deviated septum or nasal polyps? The nose is divided into two nostrils by the septum. A crooked septum is called a deviated septum. Nasal polyps are growths inside the nose or sinuses. Select one.    No   Not  selected:    Yes, but I had surgery to treat them    Yes, I have a deviated septum    Yes, I have nasal polyps    Yes, I have a deviated septum and nasal polyps    I'm not sure   Do you have a sore inside your nose that won't heal? Select one.    No   Not selected:    Yes    I'm not sure   Do you have allergies (pollen, dust mites, mold, animal dander)? Select one.    Yes   Not selected:    No    I'm not sure   What kind of allergies do you have? Select all that apply.    Seasonal allergies (hay fever)    Dust allergies   Not selected:    Pet allergies    None of the above    I'm not sure   Do you think your symptoms could be allergy-related? Select one.    No   Not selected:    Yes    I'm not sure   Have you had a flu shot this season? Select one.    Yes, 1 to 3 months ago   Not selected:    Yes, less than 2 weeks ago    Yes, 2 to 4 weeks ago    Yes, 3 to 6 months ago    Yes, more than 6 months ago    I'm not sure    No   The flu and COVID-19 can be more serious for people with certain conditions or characteristics. These questions help us figure out if you or anyone you live with is at higher risk for complications from these infections. Do either of these statements apply to you? Select all that apply.    None of the above   Not selected:    I'm  or Native [place]    I'm a healthcare worker   Do you smoke tobacco? Select one.    No, never   Not selected:    Yes, every day    Yes, some days    No, I quit   Some conditions can put you at risk for more serious infections. Do any of these apply to you? Select all that apply.    I'm in close contact with a child in    Not selected:    I've been hospitalized within the last 5 days    I have diabetes    None of the above   Are you currently being treated for any of these conditions? Scroll to see all options. Select all that apply.    High blood pressure    Irregular heartbeat or heart rhythm   Not selected:    Aspirin triad (also known as Samter's  triad or ASA triad)    Asthma or hives from taking aspirin or other NSAIDs, such as ibuprofen or naproxen    Blockage or narrowing of the blood vessels of the heart    Blood dyscrasia, such anemia, leukemia, lymphoma, or myeloma    Bone marrow depression    Catecholamine-releasing paraganglioma    Blood clotting disorder    Congenital long QT syndrome    Depression    Difficulty urinating or completely emptying your bladder    Uncorrected electrolyte abnormalities    Fungal infection    Gastrointestinal (GI) bleeding    Gastrointestinal (GI) obstruction    G6PD deficiency    Recent heart attack    Kidney disease or hemodialysis    Mononucleosis (mono)    Myasthenia gravis    Parkinson's disease    Pheochromocytoma    Reye syndrome    Seizure disorder    Ulcerative colitis    None of the above   Do you have any of these conditions that can affect the immune system? Scroll to see all options. Select all that apply.    None of these   Not selected:    History of bone marrow transplant    Chronic kidney disease    Chronic liver disease (including cirrhosis)    HIV/AIDS    Inflammatory bowel disease (Crohn's disease or ulcerative colitis)    Lupus    Moderate to severe plaque psoriasis    Multiple sclerosis    Rheumatoid arthritis    Sickle cell anemia    Alpha or beta thalassemia    History of solid organ transplant (kidney, liver, or heart)    History of spleen removal    An autoimmune disorder not listed here    A condition requiring treatment with long-term use of oral steroids (such as prednisone, prednisolone, or dexamethasone)   Have you ever been diagnosed with cancer? Select one.    No   Not selected:    Yes, I have cancer now    Yes, but I'm in remission   Have you ever had either of these conditions? Select all that apply.    No   Not selected:    Metoclopramide-associated dystonic reaction    Tardive dyskinesia   Do any of these apply to the people who live with you? Select all that apply.    None of the  above   Not selected:    A child under the age of 5    An adult 65 or older    A person who is pregnant    A person who has given birth, had a miscarriage, had a pregnancy loss, or had an  in the last 2 weeks    An  or Native [place]   Does any member of your household have any of these medical conditions? Select all that apply.    None of the above   Not selected:    Asthma    Disorders of the brain, spinal cord, or nerves and muscles, such as dementia, cerebral palsy, epilepsy, muscular dystrophy, or developmental delay    Chronic lung disease, such as COPD or cystic fibrosis    Heart disease, such as congenital heart disease, congestive heart failure, or coronary artery disease    Cerebrovascular disease, such as stroke or another condition affecting the blood vessels or blood supply to the brain    Blood disorders, such as sickle cell disease    Diabetes    Metabolic disorders such as inherited metabolic disorders or mitochondrial disease    Kidney disorders    Liver disorders    Weakened immune system due to illness or medications such as chemotherapy or steroids    Children under the age of 19 who are on long-term aspirin therapy    Extreme obesity (BMI > 40)   Have you gone through menopause? Select one.    Yes   Not selected:    No   Just a few more questions about medications, and then you're finished. Have you used any non-prescription medications or nasal sprays for your current symptoms? Examples include saline sprays, decongestants, NyQuil, and Tylenol. Select one.    Yes   Not selected:    No   Which of these non-prescription medications have you tried? Scroll to see all options. Select all that apply.    Acetaminophen (Tylenol)    Fluticasone (Flonase)   Not selected:    Budesonide (Rhinocort)    Cetirizine (Zyrtec)    Chlorpheniramine (Aller-chlor, Chlor-Trimeton)    Cromolyn (NasalCrom)    Dextromethorphan (Delsym, Robitussin, Vicks DayQuil Cough)    Diphenhydramine  (Benadryl)    Fexofenadine (Allegra)    Guaifenesin (Mucinex)    Guaifenesin/dextromethorphan (Delsym DM, Mucinex DM, Robitussin DM)    Ibuprofen (Advil, Motrin, Midol)    Ketotifen (Alaway, Zaditor)    Loratadine (Alavert, Claritin)    Naphazoline-pheniramine (Naphcon-A, Opcon-A, Visine-A)    Omeprazole (Prilosec)    Oxymetazoline (Afrin)    Phenylephrine (Sudafed)    Triamcinolone (Nasacort)    None of the above   In the past month, have you taken antibiotics for similar symptoms? Examples of antibiotics include amoxicillin, amoxicillin-clavulanate (Augmentin), penicillin, cefdinir (Omnicef), doxycycline, and clindamycin (Cleocin). Select one.    No   Not selected:    Yes    I'm not sure   Have you taken any monoamine oxidase inhibitor (MAOI) medications in the last 14 days? Examples include rasagiline (Azilect), selegiline (Eldepryl, Zelapar), isocarboxazid (Marplan), phenelzine (Nardil), and tranylcypromine (Parnate). Select one.    No   Not selected:    Yes    I'm not sure   Do you take Kynmobi or Apokyn (apomorphine)? Select one.    No   Not selected:    Yes    I'm not sure   Are you taking any other medications or supplements? On the next screen, you need to list all vitamins, supplements, non-prescription medications (such as aspirin or Aleve), and prescription medications that you're taking. Select one.    Yes   Not selected:    Yes, but I'm not sure what they are    No   Have you ever had an allergic or bad reaction to any medication? Select one.    Yes   Not selected:    No   Have you had an allergic or bad reaction to any of these medications? Select all that apply.    None of the above   Not selected:    Baloxavir (Xofluza)    Benzonatate (Tessalon Perles)    Fluconazole, itraconazole, or terconazole (brands include Diflucan, Sporanox, Terazol)    Oseltamivir (Tamiflu) or zanamivir (Relenza)    I'm not sure   Have you had an allergic or bad reaction to any of these antibiotic medications? Select all  "that apply.    Ciprofloxacin or any \"-floxacin\" antibiotic, such as gemifloxacin, levofloxacin, moxifloxacin, or ofloxacin (Brands include Factive, Cipro, Floxin, and Levaquin)   Not selected:    Penicillin or any \"-cillin\" antibiotic, such as amoxicillin, ampicillin, dicloxacillin, nafcillin, or piperacillin (Brands include Augmentin, Unasyn, and Zosyn)    Tetracycline or any \"-cycline\" antibiotic, such as doxycycline, demeclocycline, minocycline (Brands include Declomycin, Doryx, Dynacin, Oracea, Monodox, Panmycin, and Vibramycin)    Cephalexin or any \"cef-\" antibiotic, such as cefazolin, cefdinir, cefuroxime, ceftriaxone, ceftazidime, or cefepime (Brands include Ancef, Ceftin, Fortaz, Keflex, Maxipime, Rocephin, and Simplicef)    Azithromycin or any \"-thromycin\" antibiotic, such as erythromycin or clarithromycin (Brands include Biaxin, Erythrocin, Z-bernabe, and Zithromax)    Clindamycin or lincomycin (Brands include Cleocin and Lincocin)    None of the above    I'm not sure   Have you had an allergic or bad reaction to any of these medications? Select all that apply.    None of the above   Not selected:    Albuterol or a similar medication    Corticosteroid (steroid) medication, including topical steroids, inhaled steroids, nasal steroids, or oral steroids (budesonide, ciclesonide, dexamethasone, flunisolide, fluticasone, methylprednisolone, triamcinolone, prednisone (or brand names Alvesco, Deltasone, Flovent, Medrol, Nasacort, Rhinocort, or Veramyst)    Metoclopramide (Reglan)    Ondansetron (Zuplenz, Zofran ODT, Zofran)    Prochlorperazine (Compazine)    I'm not sure   Have you had an allergic or bad reaction to any of these eye drops or nasal sprays? Scroll to see all options. Select all that apply.    Azelastine (Astelin, Astepro, Optivar)   Not selected:    Cromolyn (Crolom, NasalCrom)    Ipratropium (Atrovent)    Ketotifen (Alaway, Zaditor)    Pheniramine/naphazoline (Naphcon-A, Opcon-A, Visine-A)   "  Olopatadine (Pataday, Patanol, Pazeo)    None of the above    I'm not sure   Have you had an allergic or bad reaction to any of these non-prescription medications? Scroll to see all options. Select all that apply.    None of the above   Not selected:    Acetaminophen (Tylenol)    Aspirin    Cetirizine (Zyrtec)    Chlorpheniramine (Aller-chlor, Chlor-Trimeton)    Dextromethorphan (Delsym, Robitussin, Vicks DayQuil Cough)    Diphenhydramine (Benadryl)    Fexofenadine (Allegra)    Guaifenesin (Mucinex)    Guaifenesin/dextromethorphan (Delsym DM, Mucinex DM, Robitussin DM)    Ibuprofen (Advil, Motrin, Midol)    Loratadine (Alavert, Claritin)    Oxymetazoline (Afrin)    Phenylephrine (Sudafed)    I'm not sure   Are you allergic to milk or to the proteins found in milk (for example, whey or casein)? A milk allergy is different from lactose intolerance. Select one.    No   Not selected:    Yes    I'm not sure   Have you ever had jaundice or liver problems as a result of taking amoxicillin-clavulanate (Augmentin)? Jaundice is a condition in which the skin and the whites of the eyes turn yellow. Select all that apply.    No, not that I know of   Not selected:    Yes, jaundice    Yes, liver problems   Have you ever had jaundice or liver problems as a result of taking azithromycin (Zithromax, Zmax)? Jaundice is a condition in which the skin and the whites of the eyes turn yellow. Select all that apply.    No, not that I know of   Not selected:    Yes, jaundice    Yes, liver problems   Do you need a doctor's note? A doctor's note confirms that you received care today and states when you can return to school or work. It does not contain information about your diagnosis or treatment plan. Your provider will make the final decision on whether to give you a doctor's note and for how long. Doctor's notes CANNOT be backdated. We can't provide medical leave paperwork through this type of visit. If more paperwork is needed to request  time off, contact your primary care provider. Select one.    No   Not selected:    Today only (1 day)    Today and tomorrow (2 days)    3 days    7 days    10 days    14 days   Is there anything else you'd like to tell us about your symptoms?    I take medication for high blood pressure and my high blood pressure is under control   ----------   Medical history   Medical history data does not currently exist for this patient.

## 2021-12-29 NOTE — EXTERNAL PATIENT INSTRUCTIONS
Note   I will also send in prednisone like I did previously in October, but it will be from the other computer system and will not show up on here. I hope you feel better soon. -Gracie   Diagnosis   Bacterial sinusitis   My name is Gracie Gruber, and I'm a healthcare provider at [facility]. I'm sorry you're not feeling well. I reviewed your interview, and I see that you have bacterial sinusitis.   To prevent the spread of illness to others, I recommend that you stay home and away from other people as much as possible while you're sick.   Medications   Your pharmacy   [id number] [facility] [phone]     Prescription      Amoxicillin-clavulanate (875mg/125mg): Take 1 tablet by mouth twice a day for 10 days for infection. This medication is an antibiotic. Take it exactly as directed. You must finish the entire course of medication, even if you feel better after the first few days of treatment.    Start taking the antibiotics I've prescribed right away. You need to finish the entire course of antibiotics, even if you start to feel better before the pills run out.   Some women develop yeast infections after taking antibiotics. If you develop a yeast infection, you can treat it with antifungal creams or suppositories. These are available without a prescription at [employer] and many supermarkets.   About your diagnosis   The sinuses are hollow spaces connected to the nasal passages. Sinusitis occurs when the sinuses swell and block the drainage of fluid and mucus from the nose, causing pain, pressure, and congestion. Fatigue, difficulty sleeping, or decreased appetite may accompany your symptoms.   More than 90% of sinus infections are caused by viruses. However, in certain cases, a sinus infection may be caused by bacteria. Bacterial sinus infections usually look like one of the following cases:    Severe sinus symptoms with a fever over 102F.     Sinus symptoms that have not improved at all after 10 days.    Cold symptoms that slowly improve but then worsen again after 5 or 6 days, usually with a high fever, headache, or nasal discharge.   What to expect   If you follow this treatment plan, you should start to feel better within a few days.   You can return to your normal activities when ALL of the following are true:    You've been fever-free for more than 24 hours without using fever-reducing medications such as Tylenol    Your other symptoms have improved    It's been at least 10 days since your symptoms first started   When to seek care   Call us at [phone]   with any sudden or unexpected symptoms.    Symptoms that last longer than 10 to 14 days.    Symptoms that get better for a few days, and then suddenly get worse.    Fever that measures over 103F or continues for more than 3 days.    Any vision changes.    Your headache worsens.    Stiff neck.    Swelling of your forehead or eyes.    Coughing up red or bloody mucus.    Swallowing becomes extremely difficult or impossible.    More than 5 episodes of diarrhea in a day.    More than 5 episodes of vomiting in a day.    Severe shortness of breath.    Severe chest pain   Other treatment    Rest! Your body needs rest to recover and fight infection.    Drink plenty of water to stay hydrated.    Use steam to soothe your sinuses: Breathe it in from a shower or a bowl of hot water. Placing a warm, moist washcloth over your nose and forehead may help relieve the sinus pain and pressure.    Try non-prescription saline nasal sprays to help your nasal symptoms. Try using a Neti Pot to flush out your stuffy nose and sinuses. Neti Pots are available at any drugstore without a prescription.    Avoid smoke and air pollution. Smoke can make infections worse.   Prevention    Avoid close contact with other people when you're sick.    Cover your mouth and nose when you cough or sneeze. Use a tissue or cough into  your elbow. Make sure that used tissues go directly into the trash.    Avoid touching your eyes, nose, or mouth while you're sick.    Wash your hands often, especially after coughing, sneezing, or blowing your nose. If soap and water are not available, use an alcohol-based hand .    If you or someone in your home or workplace is sick, disinfect commonly used items. This includes door handles, tables, computers, remotes, and pens.    Coronavirus (COVID-19) information   Common symptoms of COVID-19 include fever, cough, shortness of breath, fatigue, muscle or body aches, headaches, new loss of sense of taste or smell, sore throat, stuffy or runny nose, nausea or vomiting, and diarrhea. Most people who get COVID-19 have mild symptoms and can rest at home until they get better. Elderly people and those with chronic medical problems may be at risk for more serious complications.   FAQs about the COVID-19 vaccine   There are three authorized COVID-19 vaccines: Nonabox's Donaldo Vaccine (J&J/RevTrax), Moderna, and Pfizer-BioNTech (Pfizer). The J&J/Donaldo and Moderna vaccines are approved for use in people aged 18 and older. The Pfizer vaccine is approved for those aged 5 and older. All three are available at no cost. Even if you don't have health insurance, you can still get the COVID-19 vaccine for free.   Which vaccine is the best? Which vaccine should I get?   All three vaccines are highly effective. Even if you get COVID after being vaccinated, all of the vaccines help prevent severe disease, hospitalization, and complications.   Most people should get whichever vaccine is first available to them. However, women younger than 50 years old should consider the rare risk of blood clots with low platelets after vaccination with the J&J/Donaldo vaccine. This risk hasn't been seen with the other two vaccines.   Are the vaccines safe?   Yes. Hundreds of millions of people in the [place] have already safely  received COVID-19 vaccines. As part of Phase 3 clinical trials in the [place] and other countries, researchers collected safety and efficacy data for all three vaccines. These clinical trials follow strict standards. Before a vaccine is approved, the manufacturing company must submit data to the Food and Drug Administration (FDA) for review. Tens of thousands of volunteers participated in the clinical trials for the vaccines. The FDA continues to monitor safety data as the vaccines are given to the general population.   Do I need the vaccine if I've already had COVID?   Yes. Vaccination helps protect you even if you've already had COVID.   If you had COVID-19 and had symptoms, wait to get vaccinated until ALL of the following are true:    10 days since your symptoms started    24 hours with no fever, without the use of fever-reducing medications    Your other COVID-19 symptoms are improving   If you tested positive for COVID-19 but did not have symptoms, you can get vaccinated after 10 days have passed since you had a positive test, as long as you don't develop symptoms.   If you had COVID-19 and were treated with monoclonal antibodies, you should wait 90 days before getting a COVID-19 vaccination.   How many doses of the vaccine do I need?   J&J/Donaldo: one dose.   Moderna: two doses, spaced 4 weeks apart.   Pfizer vaccine: two doses, spaced 3 weeks apart.   When am I considered fully vaccinated?   J&J/Donaldo: 14 days after you get the shot.   Moderna: 14 days after your second dose.   Pfizer: 14 days after your second dose.   What if I miss the second dose of the Moderna or Pfizer vaccine?   Contact your healthcare provider to discuss your options. While one dose of the vaccine may provide some protection against COVID, you need both doses for maximum protection.   What are the common side effects of the vaccine?   A sore arm, tiredness, headache, and muscle pain may occur within two days of getting the vaccine and  "last a day or two. For the Moderna or Pfizer vaccines, side effects are more common after the second dose. People over the age of 55 are less likely to have side effects than younger people.   After I'm fully vaccinated, can I still get or spread COVID?   Yes, but your disease should be milder, and your risk of serious illness, hospitalization, and complications will be much lower. And being vaccinated reduces the risk of spreading the disease if you get it.   After I'm fully vaccinated, can I go back to normal?    You should still wear a mask indoors in public if:    It's required by laws, rules, regulations, or local guidance.    You have a weakened immune system.    Your age puts you at increased risk of severe disease.    You have a medical condition that puts you at increased risk of severe disease.    Someone in your household has a weakened immune system, is at increased risk for severe disease, or is unvaccinated.    You're in an area of high transmission.    For travel information, see the CDC's latest guidance  .    Even after you're fully vaccinated, you should still:    Get tested and stay away from others if you develop symptoms of COVID-19.    Stay home and away from other private or public settings if you've tested positive for COVID-19 in the previous 10 days.    Continue to follow any applicable laws, rules, and regulations.    If you're exposed to COVID-19 after being fully vaccinated, you should get tested 3 to 5 days after exposure, even if you don't have symptoms. Wear a mask indoors in public for 14 days following exposure, or until you've confirmed your test result is negative. If you test positive for COVID-19, you should isolate at home for 10 days.   I'm fully vaccinated but have heard about a \"third dose\" and \"fourth dose.\" Do these apply to me?   Third and fourth doses are needed for some immunocompromised people.   You should get a third dose if ALL of the following are true:    You have " "a moderately to severely weakened immune system    You've had two doses of the Moderna or Pfizer vaccine    It's been at least 28 days since your second dose   You should get a fourth dose if ALL of the following are true:    You have a moderately to severely weakened immune system    You've had three doses of the Moderna or Pfizer vaccine    It's been at least 6 months since your third dose   If any of these situations apply, you have a moderately to severely weakened immune system:    You're getting active cancer treatment for a cancer or tumor of the blood    You've had an organ transplant and are taking medicine to suppress your immune system    You've had a stem cell transplant within the last 2 years    You're taking medicine to suppress your immune system, such as high-dose corticosteroids    You have moderate to severe primary immunodeficiency (such as DiGeorge syndrome, Wiskott-Berta syndrome)    You have advanced or untreated HIV infection   If you think you need a third or fourth dose, speak with your care team.   I'm fully vaccinated but have heard about \"boosters.\" Do I need a booster shot?   Everyone 16 and older should get a booster shot. Immunity from vaccinations can decrease over time, and a booster renews the effect of the vaccination.   If you got the J&J/Donaldo vaccine AND it's been at least 2 months since your shot, you should get a booster shot now.   If you got the Pfizer or Moderna vaccine AND it's been at least 6 months since your second dose, you should get a booster shot now.   If you're 16 or 17 years old, you can get get the Pfizer booster.   If you're 18 or older, you can choose which vaccine to get as a booster. You can get the kind you originally received, or you can get a different kind. New CDC recommendations allow for mix-and-match dosing for booster shots. However, women younger than 50 years old should consider the rare risk of blood clots with low platelets after " vaccination with the J&J/Donaldo vaccine. This risk hasn't been seen with the other two vaccines.   If you'd like more information on where and how to get a booster shot, speak with your care team.   General information about COVID-19   What should I do if I'm exposed to someone with COVID-19?   In general, you need to be in close contact with someone who has COVID-19 to get infected. Close contact means:    Living in the same household as someone with COVID-19.    Caring for someone with COVID-19.    Being within 6 feet of someone with COVID-19 for a total of at least 15 minutes over a 24-hour period.    Being in direct contact with respiratory droplets from someone with COVID-19 (for example, being coughed on, kissing, or sharing utensils).   If you're exposed and not fully vaccinated:    Self-quarantine in your home for 14 days after your last known contact with the infected person. Because you can spread the disease before you have symptoms, it's very important that you stay home AT ALL TIMES, unless you need medical care. Don't go to work, school, or public places, including grocery stores and pharmacies. Avoid public transportation, ride-sharing, and taxis.    Watch for the common symptoms of COVID-19: fever, cough, shortness of breath, fatigue, muscle or body aches, headache, new loss of sense of taste or smell, sore throat, stuffy or runny nose, nausea or vomiting, and diarrhea.   What if I develop symptoms of COVID-19?   CALL your healthcare provider or clinic right away to discuss next steps if you have any of the following risk factors:    Age 65 or older    Pregnant    Chronic medical condition such as diabetes, liver disease, kidney disease requiring dialysis, heart disease, high blood pressure, severe obesity, or lung disease (including moderate to severe asthma)    A medical condition that affects your immune system    Taking a medication that affects your immune system   Otherwise, if your symptoms  are mild, you don't need to call your healthcare provider or be seen for an exam. You can recover at home and should feel better within a few weeks. Because COVID-19 is highly contagious, it's important that you avoid close contact with others while you're recovering. This means staying home AT ALL TIMES, unless you need medical care. Don't go to work, school, or public places, including grocery stores and pharmacies. Avoid public transportation, ride-sharing, and taxis.   There are currently no specific medications to treat this infection. Over-the-counter cold medications can help ease symptoms.   To prevent the spread of COVID-19 to the people and animals in your household:    Stay in a specific room away from other people in your home, and use a separate bathroom if possible.    Wear a mask when close contact with household members can't be avoided.   You can return to your normal activities when ALL of the following are true:    Your symptoms have improved    It's been at least 10 days since your symptoms first started    You've been fever-free for more than 24 hours without using fever-reducing medications such as Tylenol   When to get care   Call your healthcare provider immediately if you have any of the following:    Fever over 103F    Fever that doesn't come down after taking medications such as Tylenol or ibuprofen    Fever that returns after being gone for more than 24 hours    Fever lasting more than 4 days    Worsening shortness of breath or difficulty breathing   Go to your nearest ER or call 911 if you have any of the following:    Shortness of breath that makes it hard to do simple things like get dressed, bathe, or comb your hair    Persistent chest pain or chest tightness    New confusion or difficulty staying alert    Bluish color to the lips or face    Flu vaccine information   Getting a flu vaccine this year is more important than ever. The vaccine not only protects you and the people around  you from the flu, it also helps reduce the strain on healthcare systems responding to the COVID-19 pandemic.   Who should get a flu vaccine?   Everyone 6 months of age and older should get a yearly flu vaccine.   When should I get vaccinated?   You should get a flu vaccine by the end of October. Once you're vaccinated, it takes about two weeks for antibodies to develop and protect you against the flu. That's why it's important to get vaccinated as soon as possible.   After October, is it too late to get vaccinated?   No. You should still get vaccinated. As long as the flu viruses are still in your community, flu vaccines will remain available, even into January of next year or later.   Why do I need a flu vaccine EVERY year?   Flu viruses are constantly changing, so flu vaccines are usually updated from one season to the next. Your protection from the flu vaccine also lessens over time.   Is the flu vaccine safe?   Yes. Over the last 50 years, hundreds of millions of Americans have safely received the flu vaccines.   What are the side effects of flu vaccines?   You CANNOT get the flu from a flu vaccine. Common side effects of the flu shot include soreness, redness and/or swelling where the shot was given, low grade fever, and aches. Common side effects of the nasal spray flu vaccine for adults include runny nose, headaches, sore throat, and cough. For children, side effects include wheezing, vomiting, muscle aches, and fever.   Does the flu vaccine increase your risk of getting COVID-19?   No. There is no evidence that getting a flu vaccine increases your risk of getting COVID-19.   Is it safe to get the flu vaccine along with a COVID-19 vaccine?   Yes. It's safe to get the flu vaccine with a COVID vaccine or booster.   Contact your healthcare provider TODAY for details on when and where to get your flu vaccine.   Your provider   Your diagnosis was provided by Gracie Gruber, a member of your trusted care team  at [facility].   If you have any questions, call us at [phone]  .

## 2022-01-07 DIAGNOSIS — N95.1 VAGINAL DRYNESS, MENOPAUSAL: ICD-10-CM

## 2022-01-07 NOTE — TELEPHONE ENCOUNTER
It looks like patient has been on this medication since July 2017.  Please check with patient if she has tried other medications as per [facility] request.  Possibly they might not cover this?  The other option would potentially be to send it to the local pharmacy if she prefers that.

## 2022-01-07 NOTE — TELEPHONE ENCOUNTER
Caller: Keisha Malagon    Relationship: Self    Best call back number: [phone]    Requested Prescriptions:   Requested Prescriptions     Pending Prescriptions Disp Refills   • Estradiol 10 MCG insert 30 each 1     Sig: Insert 1 Units into the vagina Every 3 (Three) Days.        Pharmacy where request should be sent: [facility] - [address] [phone] [facility] [phone] FX     Additional details provided by patient: THIS PRESCRIPTION NEEDS A TIER EXCEPTION SENT TO [facility].  THEY NEED TO KNOW THAT THIS IS THE ONLY MEDICATION THAT WORKS FOR HER DIAGNOSIS.  PLEASE CALL [phone]    Does the patient have less than a 3 day supply:  [x] Yes  [] No    Terrance Rubio, PCT   01/07/22 10:49 EST

## 2022-01-10 NOTE — TELEPHONE ENCOUNTER
Called patient she said she has tried the cream, pills , patches, and Estring and none works the inserts are the only ones she can use with her Pessary w/o bleeding or infection.  [facility] Suzy gave her all the information she sent it through and got the tier adjustment completed waiting to see if it is approved

## 2022-01-11 DIAGNOSIS — N95.1 VAGINAL DRYNESS, MENOPAUSAL: ICD-10-CM

## 2022-01-11 NOTE — TELEPHONE ENCOUNTER
Rx Refill Note  Requested Prescriptions     Pending Prescriptions Disp Refills   • Estradiol 10 MCG insert 30 each 1     Sig: Insert 1 Units into the vagina Every 3 (Three) Days.      Last office visit with prescribing clinician: 12/2/2021      Next office visit with prescribing clinician: 2/4/2022            Mercedes Christiansen LPN  01/11/22, 07:55 EST

## 2022-01-31 ENCOUNTER — LAB (OUTPATIENT)
Dept: LAB | Facility: HOSPITAL | Age: 72
End: 2022-01-31

## 2022-01-31 DIAGNOSIS — I10 ESSENTIAL HYPERTENSION: Chronic | ICD-10-CM

## 2022-01-31 PROCEDURE — 80053 COMPREHEN METABOLIC PANEL: CPT

## 2022-02-01 LAB
ALBUMIN SERPL-MCNC: 4.7 G/DL (ref 3.5–5.2)
ALBUMIN/GLOB SERPL: 1.6 G/DL
ALP SERPL-CCNC: 57 U/L (ref 39–117)
ALT SERPL W P-5'-P-CCNC: 23 U/L (ref 1–33)
ANION GAP SERPL CALCULATED.3IONS-SCNC: 8.8 MMOL/L (ref 5–15)
AST SERPL-CCNC: 26 U/L (ref 1–32)
BILIRUB SERPL-MCNC: 0.4 MG/DL (ref 0–1.2)
BUN SERPL-MCNC: 14 MG/DL (ref 8–23)
BUN/CREAT SERPL: 17.3 (ref 7–25)
CALCIUM SPEC-SCNC: 9.6 MG/DL (ref 8.6–10.5)
CHLORIDE SERPL-SCNC: 100 MMOL/L (ref 98–107)
CO2 SERPL-SCNC: 29.2 MMOL/L (ref 22–29)
CREAT SERPL-MCNC: 0.81 MG/DL (ref 0.57–1)
GFR SERPL CREATININE-BSD FRML MDRD: 70 ML/MIN/1.73
GLOBULIN UR ELPH-MCNC: 2.9 GM/DL
GLUCOSE SERPL-MCNC: 94 MG/DL (ref 65–99)
POTASSIUM SERPL-SCNC: 4.3 MMOL/L (ref 3.5–5.2)
PROT SERPL-MCNC: 7.6 G/DL (ref 6–8.5)
SODIUM SERPL-SCNC: 138 MMOL/L (ref 136–145)

## 2022-02-08 ENCOUNTER — OFFICE VISIT (OUTPATIENT)
Dept: INTERNAL MEDICINE | Facility: CLINIC | Age: 72
End: 2022-02-08

## 2022-02-08 VITALS
OXYGEN SATURATION: 98 % | WEIGHT: 246.4 LBS | DIASTOLIC BLOOD PRESSURE: 84 MMHG | BODY MASS INDEX: 37.35 KG/M2 | TEMPERATURE: 98.6 F | SYSTOLIC BLOOD PRESSURE: 126 MMHG | HEIGHT: 68 IN | HEART RATE: 82 BPM

## 2022-02-08 DIAGNOSIS — I10 ESSENTIAL HYPERTENSION: ICD-10-CM

## 2022-02-08 DIAGNOSIS — Z00.00 ENCOUNTER FOR SUBSEQUENT ANNUAL WELLNESS VISIT (AWV) IN MEDICARE PATIENT: Primary | ICD-10-CM

## 2022-02-08 DIAGNOSIS — M25.50 POLYARTHRALGIA: ICD-10-CM

## 2022-02-08 DIAGNOSIS — E66.01 SEVERE OBESITY WITH BODY MASS INDEX (BMI) OF 35.0 TO 39.9 WITH COMORBIDITY: ICD-10-CM

## 2022-02-08 DIAGNOSIS — E78.2 MIXED HYPERLIPIDEMIA: ICD-10-CM

## 2022-02-08 PROCEDURE — 1159F MED LIST DOCD IN RCRD: CPT | Performed by: STUDENT IN AN ORGANIZED HEALTH CARE EDUCATION/TRAINING PROGRAM

## 2022-02-08 PROCEDURE — 1125F AMNT PAIN NOTED PAIN PRSNT: CPT | Performed by: STUDENT IN AN ORGANIZED HEALTH CARE EDUCATION/TRAINING PROGRAM

## 2022-02-08 PROCEDURE — G0439 PPPS, SUBSEQ VISIT: HCPCS | Performed by: STUDENT IN AN ORGANIZED HEALTH CARE EDUCATION/TRAINING PROGRAM

## 2022-02-08 RX ORDER — EZETIMIBE 10 MG/1
10 TABLET ORAL DAILY
Qty: 30 TABLET | Refills: 3 | Status: SHIPPED | OUTPATIENT
Start: 2022-02-08

## 2022-02-08 NOTE — PROGRESS NOTES
QUICK REFERENCE INFORMATION:  The ABCs of the Annual Wellness Visit    Subsequent Medicare Wellness Visit    Patient has a past medical history of hypertension, gastritis, overactive bladder and pelvic floor dysfunction, hyperlipidemia, allergic rhinitis, morbid obesity, osteoarthritis, chronic low back pain and SULAIMAN on CPAP    Patient is overall doing well.  He has history of hyperlipidemia and has tried multiple statins in the past, including rosuvastatin, atorvastatin and pravastatin.  All of them has caused her to have worsening muscle pain, and she is requesting to try a different medication.  She is inquiring about Zetia.    Patient also is requesting referral to rheumatology, she has an appointment next week to see rheumatologist with [facility].  She has a history of osteoarthritis and would like evaluation by rheumatology.    HEALTH RISK ASSESSMENT    1950    Recent Hospitalizations:  No hospitalization(s) within the last year..        Current Medical Providers:  Patient Care Team:  Luis Ma MD as PCP - General (Family Medicine)  Bryn King MD as Consulting Physician (Gastroenterology)  Gilda Choi MD as Surgeon (Orthopedic Surgery)  Gerry Montero MD as Surgeon (Orthopedic Surgery)  Kartik Hamlin MD as Consulting Physician (Ophthalmology)  Braden Ndaiye MD as Consulting Physician (Urology)        Smoking Status:  Social History     Tobacco Use   Smoking Status Former Smoker   • Packs/day: 0.25   • Years: 1.00   • Pack years: 0.25   • Types: Cigarettes   • Quit date:    • Years since quittin.1   Smokeless Tobacco Never Used   Tobacco Comment    4 cigarettes a week senior year of highschool       Alcohol Consumption:  Social History     Substance and Sexual Activity   Alcohol Use No       Depression Screen:   PHQ-2/PHQ-9 Depression Screening 2022   Little interest or pleasure in doing things 0   Feeling down, depressed, or hopeless 0    Total Score 0       Health Habits and Functional and Cognitive Screening:  Functional & Cognitive Status 2/8/2022   Do you have difficulty preparing food and eating? No   Do you have difficulty bathing yourself, getting dressed or grooming yourself? No   Do you have difficulty using the toilet? No   Do you have difficulty moving around from place to place? Yes   Do you have trouble with steps or getting out of a bed or a chair? Yes   Current Diet Well Balanced Diet   Dental Exam Up to date   Eye Exam Up to date   Exercise (times per week) 0 times per week   Current Exercises Include No Regular Exercise   Do you need help using the phone?  No   Are you deaf or do you have serious difficulty hearing?  No   Do you need help with transportation? No   Do you need help shopping? No   Do you need help preparing meals?  No   Do you need help with housework?  Yes   Do you need help with laundry? No   Do you need help taking your medications? No   Do you need help managing money? No   Do you ever drive or ride in a car without wearing a seat belt? No   Have you felt unusual stress, anger or loneliness in the last month? No   Who do you live with? Alone   If you need help, do you have trouble finding someone available to you? No   Have you been bothered in the last four weeks by sexual problems? No   Do you have difficulty concentrating, remembering or making decisions? No       Fall Risk Screen:  CELIOADI Fall Risk Assessment was completed, and patient is at LOW risk for falls.Assessment completed on:2/8/2022    ACE III MINI        Does the patient have evidence of cognitive impairment? Yes    Aspirin use counseling: Does not need ASA (and currently is not on it)    Recent Lab Results:  CMP:  Lab Results   Component Value Date    BUN 14 01/31/2022    CREATININE 0.81 01/31/2022    EGFRIFNONA 70 01/31/2022    BCR 17.3 01/31/2022     01/31/2022    K 4.3 01/31/2022    CO2 29.2 (H) 01/31/2022    CALCIUM 9.6 01/31/2022     ALBUMIN 4.70 01/31/2022    BILITOT 0.4 01/31/2022    ALKPHOS 57 01/31/2022    AST 26 01/31/2022    ALT 23 01/31/2022     HbA1c:  Lab Results   Component Value Date    HGBA1C 5.64 (H) 03/30/2021     Microalbumin:  Lab Results   Component Value Date    MICROALBUR <1.2 03/30/2021     Lipid Panel  Lab Results   Component Value Date    CHOL 243 (H) 03/30/2021    TRIG 402 (H) 03/30/2021    HDL 42 03/30/2021     (H) 03/30/2021    AST 26 01/31/2022    ALT 23 01/31/2022       Visual Acuity:  No exam data present    Age-appropriate Screening Schedule:  Refer to the list below for future screening recommendations based on patient's age, sex and/or medical conditions. Orders for these recommended tests are listed in the plan section. The patient has been provided with a written plan.    Health Maintenance   Topic Date Due   • DXA SCAN  Never done   • ZOSTER VACCINE (1 of 2) Never done   • LIPID PANEL  03/30/2022   • MAMMOGRAM  07/18/2022   • TDAP/TD VACCINES (2 - Td or Tdap) 02/10/2027   • INFLUENZA VACCINE  Completed        Subjective   History of Present Illness    Keisha Malagon is a 71 y.o. female who presents for a Subsequent Wellness Visit.    CHRONIC CONDITIONS    The following portions of the patient's history were reviewed and updated as appropriate: allergies, current medications, past family history, past medical history, past social history, past surgical history and problem list.    Outpatient Medications Prior to Visit   Medication Sig Dispense Refill   • acetaminophen (TYLENOL) 650 MG 8 hr tablet Take 1,300 mg by mouth 2 (Two) Times a Day.     • B Complex-Biotin-FA (SUPER B-COMPLEX) tablet Take 1 tablet by mouth Daily.     • baclofen (LIORESAL) 10 MG tablet Take 1 tablet by mouth Daily. (Patient taking differently: Take 10 mg by mouth Daily As Needed.) 90 tablet 0   • Collagen 500 MG capsule Take 1 capsule by mouth Daily.     • DEXILANT 60 MG capsule Take 1 capsule by mouth Daily.     • Estradiol 10 MCG  insert Insert 1 Units into the vagina Every 3 (Three) Days. 30 each 1   • fluticasone (FLONASE) 50 MCG/ACT nasal spray 1 spray into the nostril(s) as directed by provider 2 (two) times a day. 29.7 mL 3   • Glucosamine-Chondroitin 250-200 MG tablet Take 1 tablet by mouth Daily.     • hydroCHLOROthiazide (HYDRODIURIL) 12.5 MG tablet Take 1 tablet by mouth Daily. 90 tablet 1   • multivitamin with minerals (MULTIVITAMIN WOMEN 50+ PO) Take 1 tablet by mouth Daily.     • mupirocin (BACTROBAN) 2 % ointment PRN     • traMADol (ULTRAM) 50 MG tablet Take 50 mg by mouth Daily As Needed.     • estradiol (ESTRACE) 0.1 MG/GM vaginal cream As Needed.       No facility-administered medications prior to visit.       Patient Active Problem List   Diagnosis   • Chronic midline low back pain without sciatica   • Enlarged lymph nodes   • Chronic gastritis without bleeding   • SUALIMAN on CPAP   • Primary osteoarthritis involving multiple joints   • Overactive bladder   • Mixed hyperlipidemia   • Non-seasonal allergic rhinitis   • Pelvic floor dysfunction   • Vitamin D deficiency   • Severe obesity with body mass index (BMI) of 35.0 to 39.9 with comorbidity (HCC)   • Urinary frequency   • Chronic right shoulder pain   • Chronic pain syndrome   • Dyspnea on exertion   • Essential hypertension   • Skipped heart beats   • Elevated serum creatinine       Advance Care Planning:  ACP discussion was held with the patient during this visit. Patient does not have an advance directive, information provided.    Identification of Risk Factors:  Risk factors include: Advance Directive Discussion  Obesity/Overweight .    Review of Systems    Compared to one year ago, the patient feels her physical health is the same.  Compared to one year ago, the patient feels her mental health is the same.    Objective     Physical Exam  Vitals reviewed.   Constitutional:       Appearance: Normal appearance.   HENT:      Head: Normocephalic and atraumatic.      Nose: No  "congestion.   Eyes:      Extraocular Movements: Extraocular movements intact.      Conjunctiva/sclera: Conjunctivae normal.   Cardiovascular:      Rate and Rhythm: Normal rate and regular rhythm.      Heart sounds: Normal heart sounds. No murmur heard.      Pulmonary:      Effort: Pulmonary effort is normal.      Breath sounds: Normal breath sounds.   Abdominal:      General: There is no distension.      Palpations: Abdomen is soft. There is no mass.      Tenderness: There is no abdominal tenderness.   Musculoskeletal:      Cervical back: Neck supple.      Right lower leg: No edema.      Left lower leg: No edema.   Skin:     General: Skin is warm and dry.   Neurological:      Mental Status: She is alert and oriented to person, place, and time. Mental status is at baseline.   Psychiatric:         Behavior: Behavior normal.         Thought Content: Thought content normal.          Procedures     Vitals:    02/08/22 1146   BP: 126/84   BP Location: Left arm   Patient Position: Sitting   Cuff Size: Large Adult   Pulse: 82   Temp: 98.6 °F (37 °C)   TempSrc: Temporal   SpO2: 98%   Weight: 112 kg (246 lb 6.4 oz)   Height: 171.8 cm (67.62\")   PainSc:   2   PainLoc: Generalized       Patient's Body mass index is 37.89 kg/m². indicating that she is morbidly obese (BMI > 40 or > 35 with obesity - related health condition). Obesity-related health conditions include the following: hypertension, dyslipidemias and osteoarthritis. Obesity is worsening. BMI is is above average; BMI management plan is completed. We discussed portion control and increasing exercise..      Assessment/Plan     1. Encounter for subsequent annual wellness visit (AWV) in Medicare patient    2. Mixed hyperlipidemia  The 10-year ASCVD risk score (Nancy ALBER Jr., et al., 2013) is: 16.1%    Values used to calculate the score:      Age: 71 years      Sex: Female      Is Non- : No      Diabetic: No      Tobacco smoker: No      Systolic Blood " Pressure: 126 mmHg      Is BP treated: Yes      HDL Cholesterol: 42 mg/dL      Total Cholesterol: 243 mg/dL  Patient has failed multiple statins.  We will do trial of Zetia.  She will return for follow-up visit with me in about 1 month, she will do fasting blood work a few days before.  - ezetimibe (Zetia) 10 MG tablet; Take 1 tablet by mouth Daily.  Dispense: 30 tablet; Refill: 3  - Lipid Panel; Future    3. Polyarthralgia  We will refer to rheumatology as requested.  - Ambulatory Referral to Rheumatology    4. Essential hypertension  BP Readings from Last 3 Encounters:   02/08/22 126/84   12/02/21 132/78   06/04/21 142/73   Blood pressure currently well controlled.  Continue on current regimen.  - Comprehensive Metabolic Panel; Future    5. Severe obesity with body mass index (BMI) of 35.0 to 39.9 with comorbidity (HCC)      Patient Self-Management and Personalized Health Advice  The patient has been provided with information about: diet, exercise and weight management and preventive services including:   · Annual Wellness Visit (AWV).    Outpatient Encounter Medications as of 2/8/2022   Medication Sig Dispense Refill   • acetaminophen (TYLENOL) 650 MG 8 hr tablet Take 1,300 mg by mouth 2 (Two) Times a Day.     • B Complex-Biotin-FA (SUPER B-COMPLEX) tablet Take 1 tablet by mouth Daily.     • baclofen (LIORESAL) 10 MG tablet Take 1 tablet by mouth Daily. (Patient taking differently: Take 10 mg by mouth Daily As Needed.) 90 tablet 0   • Collagen 500 MG capsule Take 1 capsule by mouth Daily.     • DEXILANT 60 MG capsule Take 1 capsule by mouth Daily.     • Estradiol 10 MCG insert Insert 1 Units into the vagina Every 3 (Three) Days. 30 each 1   • fluticasone (FLONASE) 50 MCG/ACT nasal spray 1 spray into the nostril(s) as directed by provider 2 (two) times a day. 29.7 mL 3   • Glucosamine-Chondroitin 250-200 MG tablet Take 1 tablet by mouth Daily.     • hydroCHLOROthiazide (HYDRODIURIL) 12.5 MG tablet Take 1 tablet by  mouth Daily. 90 tablet 1   • multivitamin with minerals (MULTIVITAMIN WOMEN 50+ PO) Take 1 tablet by mouth Daily.     • mupirocin (BACTROBAN) 2 % ointment PRN     • traMADol (ULTRAM) 50 MG tablet Take 50 mg by mouth Daily As Needed.     • estradiol (ESTRACE) 0.1 MG/GM vaginal cream As Needed.     • ezetimibe (Zetia) 10 MG tablet Take 1 tablet by mouth Daily. 30 tablet 3     No facility-administered encounter medications on file as of 2/8/2022.       Reviewed use of high risk medication in the elderly: yes  Reviewed for potential of harmful drug interactions in the elderly: yes    Follow Up:  Return in about 5 weeks (around 3/15/2022) for Recheck.     Future Appointments       Provider Department Center    3/18/2022 9:30 AM Luis Ma MD [facility]          There are no Patient Instructions on file for this visit.    An After Visit Summary and PPPS with all of these plans were given to the patient.        Luis Ma MD

## 2022-03-01 ENCOUNTER — TRANSCRIBE ORDERS (OUTPATIENT)
Dept: ADMINISTRATIVE | Facility: HOSPITAL | Age: 72
End: 2022-03-01

## 2022-03-01 DIAGNOSIS — M51.36 DDD (DEGENERATIVE DISC DISEASE), LUMBAR: Primary | ICD-10-CM

## 2022-03-24 ENCOUNTER — TELEPHONE (OUTPATIENT)
Dept: INTERNAL MEDICINE | Facility: CLINIC | Age: 72
End: 2022-03-24

## 2022-03-24 NOTE — TELEPHONE ENCOUNTER
Caller: Keisha Malagon    Relationship: Self    Best call back number: [phone]    What form or medical record are you requesting: COPY OF SLEEP STUDY FROM DR. GALLAGHER    Who is requesting this form or medical record from you: [facility]    How would you like to receive the form or medical records (pick-up, mail, fax): FAX    If fax, what is the fax number:[phone]     If mail, what is the address:     If pick-up, provide patient with address and location details    Timeframe paperwork needed: ASAP    Additional notes:

## 2022-04-11 DIAGNOSIS — J30.89 NON-SEASONAL ALLERGIC RHINITIS, UNSPECIFIED TRIGGER: ICD-10-CM

## 2022-04-11 RX ORDER — FLUTICASONE PROPIONATE 50 MCG
SPRAY, SUSPENSION (ML) NASAL
Qty: 48 G | Refills: 3 | Status: SHIPPED | OUTPATIENT
Start: 2022-04-11

## 2022-04-11 NOTE — TELEPHONE ENCOUNTER
Rx Refill Note  Requested Prescriptions     Pending Prescriptions Disp Refills   • fluticasone (FLONASE) 50 MCG/ACT nasal spray [Pharmacy Med Name: FLUTICASONE PROP NASAL SPRAY 16GM 50MCG] 48 g 3     Sig: USE 1 SPRAY IN EACH NOSTRIL AS DIRECTED BY PROVIDER TWICE A DAY      Last office visit with prescribing clinician: 4/12/2021      Next office visit with prescribing clinician: Visit date not found            Teressa Reyes RN  04/11/22, 09:34 EDT

## 2022-04-29 ENCOUNTER — HOSPITAL ENCOUNTER (OUTPATIENT)
Dept: MRI IMAGING | Facility: HOSPITAL | Age: 72
Discharge: HOME | End: 2022-04-29
Admitting: INTERNAL MEDICINE

## 2022-04-29 DIAGNOSIS — M51.36 DDD (DEGENERATIVE DISC DISEASE), LUMBAR: ICD-10-CM

## 2022-04-29 PROCEDURE — 72148 MRI LUMBAR SPINE W/O DYE: CPT

## 2022-06-07 DIAGNOSIS — I10 ESSENTIAL HYPERTENSION: Chronic | ICD-10-CM

## 2022-06-07 RX ORDER — HYDROCHLOROTHIAZIDE 12.5 MG/1
TABLET ORAL
Qty: 90 TABLET | Refills: 3 | Status: SHIPPED | OUTPATIENT
Start: 2022-06-07

## 2022-08-22 ENCOUNTER — LAB (OUTPATIENT)
Dept: LAB | Facility: HOSPITAL | Age: 72
End: 2022-08-22

## 2022-08-22 ENCOUNTER — TELEPHONE (OUTPATIENT)
Dept: INTERNAL MEDICINE | Facility: CLINIC | Age: 72
End: 2022-08-22

## 2022-08-22 DIAGNOSIS — E78.2 MIXED HYPERLIPIDEMIA: ICD-10-CM

## 2022-08-22 DIAGNOSIS — I10 ESSENTIAL HYPERTENSION: ICD-10-CM

## 2022-08-22 PROCEDURE — 80061 LIPID PANEL: CPT

## 2022-08-22 PROCEDURE — 80053 COMPREHEN METABOLIC PANEL: CPT

## 2022-08-22 NOTE — TELEPHONE ENCOUNTER
Caller: Keisha Malagon    Relationship: Self    Best call back number: [phone]    What is the best time to reach you: ANYTIME     Who are you requesting to speak with (clinical staff, provider,  specific staff member): CLINICAL STAFF     What was the call regarding: PATIENT IS CALLING TO ASK THAT IF THE OFFICE RECEIVES ANY TEST RESULTS ON HER THAT THEY PLEASE FAX IT TO DR ALEXANDER ADKINS.     Do you require a callback: YES

## 2022-08-23 LAB
ALBUMIN SERPL-MCNC: 4.4 G/DL (ref 3.5–5.2)
ALBUMIN/GLOB SERPL: 1.8 G/DL
ALP SERPL-CCNC: 65 U/L (ref 39–117)
ALT SERPL W P-5'-P-CCNC: 22 U/L (ref 1–33)
ANION GAP SERPL CALCULATED.3IONS-SCNC: 14.5 MMOL/L (ref 5–15)
AST SERPL-CCNC: 24 U/L (ref 1–32)
BILIRUB SERPL-MCNC: 0.5 MG/DL (ref 0–1.2)
BUN SERPL-MCNC: 17 MG/DL (ref 8–23)
BUN/CREAT SERPL: 20.5 (ref 7–25)
CALCIUM SPEC-SCNC: 9.4 MG/DL (ref 8.6–10.5)
CHLORIDE SERPL-SCNC: 100 MMOL/L (ref 98–107)
CHOLEST SERPL-MCNC: 272 MG/DL (ref 0–200)
CO2 SERPL-SCNC: 27.5 MMOL/L (ref 22–29)
CREAT SERPL-MCNC: 0.83 MG/DL (ref 0.57–1)
EGFRCR SERPLBLD CKD-EPI 2021: 75 ML/MIN/1.73
GLOBULIN UR ELPH-MCNC: 2.5 GM/DL
GLUCOSE SERPL-MCNC: 109 MG/DL (ref 65–99)
HDLC SERPL-MCNC: 51 MG/DL (ref 40–60)
LDLC SERPL CALC-MCNC: 141 MG/DL (ref 0–100)
LDLC/HDLC SERPL: 2.62 {RATIO}
POTASSIUM SERPL-SCNC: 4.3 MMOL/L (ref 3.5–5.2)
PROT SERPL-MCNC: 6.9 G/DL (ref 6–8.5)
SODIUM SERPL-SCNC: 142 MMOL/L (ref 136–145)
TRIGL SERPL-MCNC: 436 MG/DL (ref 0–150)
VLDLC SERPL-MCNC: 80 MG/DL (ref 5–40)

## 2023-03-21 DIAGNOSIS — J30.89 NON-SEASONAL ALLERGIC RHINITIS, UNSPECIFIED TRIGGER: ICD-10-CM

## 2023-03-22 RX ORDER — FLUTICASONE PROPIONATE 50 MCG
SPRAY, SUSPENSION (ML) NASAL
Qty: 48 G | Refills: 3 | OUTPATIENT
Start: 2023-03-22

## 2023-04-02 NOTE — PATIENT INSTRUCTIONS
Take medicine as prescribed.   Most likely your current symptoms are due to an allergic response to the inhaled dirt/dust. However, I have prescribed an antibiotic for you to prevent Pneumonia. The steroid will help decrease inflammation and calm the allergic response.  Monitor symptoms closely for wheezing, shortness of breath, chest pain, fever or cough.   Continue saline nasal rinses.   If symptoms worsen or do not improve follow up with your PCP or visit your nearest Urgent Care Center or ER.      Pneumonitis    Pneumonitis is inflammation of the lungs. Infection or exposure to certain substances or allergens can cause this condition. Allergens are substances that you are allergic to.  What are the causes?  This condition may be caused by:  · An infection from bacteria or a virus (pneumonia).  · Exposure to certain substances in the workplace. This includes working on farms and in certain industries. Some substances that can cause this condition include asbestos, silica, inhaled acids, or inhaled chlorine gas.  · Repeated exposure to bird feathers, bird feces, or other allergens.  · Medicines such as chemotherapy drugs, certain antibiotic medicines, and some heart medicines.  · Radiation therapy.  · Exposure to mold. A hot tub, sauna, or home humidifier can have mold growing in it, even if it looks clean. You can breathe in the mold through water vapor.  · Breathing in (aspirating) stomach contents, food, or liquids into the lungs.  What are the signs or symptoms?  Symptoms of this condition include:  · Shortness of breath or trouble breathing. This is the most common symptom.  · Cough.  · Fever.  · Decreased energy.  · Decreased appetite.  How is this diagnosed?  This condition may be diagnosed based on:  · Your medical history.  · Physical exam.  · Blood tests.  · Other tests, including:  ? Pulmonary function test (PFT). This measures how well your lungs work.  ? Chest X-ray.  ? CT scan of the  lungs.  ? Bronchoscopy. In this procedure, your health care provider looks at your airways through an instrument called a bronchoscope.  ? Lung biopsy. In this procedure, your health care provider takes a small piece of tissue from your lungs to examine it.  How is this treated?  Treatment depends on the cause of the condition. If the cause is exposure to a substance, avoiding further exposure to that substance will help reduce your symptoms. Possible medical treatments for pneumonitis include:  · Corticosteroid medicine to help decrease inflammation.  · Antibiotic medicine to help fight an infection caused by bacteria.  · Bronchodilators or inhalers to help relax the muscles and make breathing easier.  · Oxygen therapy, if you are having trouble breathing.  Follow these instructions at home:  · Take or use over-the-counter and prescription medicines only as told by your health care provider. This includes any inhaler use.  · Avoid exposure to any substance that caused your pneumonitis. If you need to work with substances that can cause pneumonitis, wear a mask to protect your lungs.  · If you were prescribed an antibiotic, take it as told by your health care provider. Do not stop taking the antibiotic even if you start to feel better.  · If you were prescribed an inhaler, keep it with you at all times.  · Do not use any products that contain nicotine or tobacco, such as cigarettes and e-cigarettes. If you need help quitting, ask your health care provider.  · Keep all follow-up visits as told by your health care provider. This is important.  Contact a health care provider if:  · You have a fever.  · Your symptoms get worse.  Get help right away if:  · You have new or worse shortness of breath.  · You develop a blue color (cyanosis) under your fingernails.  Summary  · Pneumonitis is inflammation of the lungs. This condition can be caused by infection or exposure to certain substances or allergens.  · The most common  symptom of this condition is shortness of breath or trouble breathing.  · Treatment depends on the cause of your condition.  This information is not intended to replace advice given to you by your health care provider. Make sure you discuss any questions you have with your health care provider.  Document Released: 06/07/2011 Document Revised: 11/30/2018 Document Reviewed: 11/09/2017  Elsevier Patient Education © 2020 [employer]  Sinusitis, Adult  Sinusitis is inflammation of your sinuses. Sinuses are hollow spaces in the bones around your face. Your sinuses are located:  · Around your eyes.  · In the middle of your forehead.  · Behind your nose.  · In your cheekbones.  Mucus normally drains out of your sinuses. When your nasal tissues become inflamed or swollen, mucus can become trapped or blocked. This allows bacteria, viruses, and fungi to grow, which leads to infection. Most infections of the sinuses are caused by a virus.  Sinusitis can develop quickly. It can last for up to 4 weeks (acute) or for more than 12 weeks (chronic). Sinusitis often develops after a cold.  What are the causes?  This condition is caused by anything that creates swelling in the sinuses or stops mucus from draining. This includes:  · Allergies.  · Asthma.  · Infection from bacteria or viruses.  · Deformities or blockages in your nose or sinuses.  · Abnormal growths in the nose (nasal polyps).  · Pollutants, such as chemicals or irritants in the air.  · Infection from fungi (rare).  What increases the risk?  You are more likely to develop this condition if you:  · Have a weak body defense system (immune system).  · Do a lot of swimming or diving.  · Overuse nasal sprays.  · Smoke.  What are the signs or symptoms?  The main symptoms of this condition are pain and a feeling of pressure around the affected sinuses. Other symptoms include:  · Stuffy nose or congestion.  · Thick drainage from your nose.  · Swelling and warmth over the  affected sinuses.  · Headache.  · Upper toothache.  · A cough that may get worse at night.  · Extra mucus that collects in the throat or the back of the nose (postnasal drip).  · Decreased sense of smell and taste.  · Fatigue.  · A fever.  · Sore throat.  · Bad breath.  How is this diagnosed?  This condition is diagnosed based on:  · Your symptoms.  · Your medical history.  · A physical exam.  · Tests to find out if your condition is acute or chronic. This may include:  ? Checking your nose for nasal polyps.  ? Viewing your sinuses using a device that has a light (endoscope).  ? Testing for allergies or bacteria.  ? Imaging tests, such as an MRI or CT scan.  In rare cases, a bone biopsy may be done to rule out more serious types of fungal sinus disease.  How is this treated?  Treatment for sinusitis depends on the cause and whether your condition is chronic or acute.  · If caused by a virus, your symptoms should go away on their own within 10 days. You may be given medicines to relieve symptoms. They include:  ? Medicines that shrink swollen nasal passages (topical intranasal decongestants).  ? Medicines that treat allergies (antihistamines).  ? A spray that eases inflammation of the nostrils (topical intranasal corticosteroids).  ? Rinses that help get rid of thick mucus in your nose (nasal saline washes).  · If caused by bacteria, your health care provider may recommend waiting to see if your symptoms improve. Most bacterial infections will get better without antibiotic medicine. You may be given antibiotics if you have:  ? A severe infection.  ? A weak immune system.  · If caused by narrow nasal passages or nasal polyps, you may need to have surgery.  Follow these instructions at home:  Medicines  · Take, use, or apply over-the-counter and prescription medicines only as told by your health care provider. These may include nasal sprays.  · If you were prescribed an antibiotic medicine, take it as told by your  health care provider. Do not stop taking the antibiotic even if you start to feel better.  Hydrate and humidify    · Drink enough fluid to keep your urine pale yellow. Staying hydrated will help to thin your mucus.  · Use a cool mist humidifier to keep the humidity level in your home above 50%.  · Inhale steam for 10-15 minutes, 3-4 times a day, or as told by your health care provider. You can do this in the bathroom while a hot shower is running.  · Limit your exposure to cool or dry air.  Rest  · Rest as much as possible.  · Sleep with your head raised (elevated).  · Make sure you get enough sleep each night.  General instructions    · Apply a warm, moist washcloth to your face 3-4 times a day or as told by your health care provider. This will help with discomfort.  · Wash your hands often with soap and water to reduce your exposure to germs. If soap and water are not available, use hand .  · Do not smoke. Avoid being around people who are smoking (secondhand smoke).  · Keep all follow-up visits as told by your health care provider. This is important.  Contact a health care provider if:  · You have a fever.  · Your symptoms get worse.  · Your symptoms do not improve within 10 days.  Get help right away if:  · You have a severe headache.  · You have persistent vomiting.  · You have severe pain or swelling around your face or eyes.  · You have vision problems.  · You develop confusion.  · Your neck is stiff.  · You have trouble breathing.  Summary  · Sinusitis is soreness and inflammation of your sinuses. Sinuses are hollow spaces in the bones around your face.  · This condition is caused by nasal tissues that become inflamed or swollen. The swelling traps or blocks the flow of mucus. This allows bacteria, viruses, and fungi to grow, which leads to infection.  · If you were prescribed an antibiotic medicine, take it as told by your health care provider. Do not stop taking the antibiotic even if you start to  feel better.  · Keep all follow-up visits as told by your health care provider. This is important.  This information is not intended to replace advice given to you by your health care provider. Make sure you discuss any questions you have with your health care provider.  Document Released: 12/18/2006 Document Revised: 05/20/2019 Document Reviewed: 05/20/2019  Elsevier Patient Education © 2020 [employer]     Ambulatory

## 2023-06-09 ENCOUNTER — LAB (OUTPATIENT)
Dept: LAB | Facility: HOSPITAL | Age: 73
End: 2023-06-09
Payer: MEDICARE

## 2023-06-09 ENCOUNTER — TRANSCRIBE ORDERS (OUTPATIENT)
Dept: ADMINISTRATIVE | Facility: HOSPITAL | Age: 73
End: 2023-06-09
Payer: MEDICARE

## 2023-06-09 ENCOUNTER — TRANSCRIBE ORDERS (OUTPATIENT)
Dept: LAB | Facility: HOSPITAL | Age: 73
End: 2023-06-09
Payer: MEDICARE

## 2023-06-09 DIAGNOSIS — Z12.31 VISIT FOR SCREENING MAMMOGRAM: Primary | ICD-10-CM

## 2023-06-09 DIAGNOSIS — E78.2 MIXED HYPERLIPIDEMIA: ICD-10-CM

## 2023-06-09 DIAGNOSIS — E78.2 MIXED HYPERLIPIDEMIA: Primary | ICD-10-CM

## 2023-06-09 LAB
CHOLEST SERPL-MCNC: 306 MG/DL (ref 0–200)
HDLC SERPL-MCNC: 41 MG/DL (ref 40–60)
LDLC SERPL CALC-MCNC: 157 MG/DL (ref 0–100)
LDLC/HDLC SERPL: 3.75 {RATIO}
TRIGL SERPL-MCNC: 556 MG/DL (ref 0–150)
VLDLC SERPL-MCNC: 108 MG/DL (ref 5–40)

## 2023-06-09 PROCEDURE — 80061 LIPID PANEL: CPT

## 2023-06-09 PROCEDURE — 36415 COLL VENOUS BLD VENIPUNCTURE: CPT

## 2024-09-08 ENCOUNTER — E-VISIT (OUTPATIENT)
Dept: FAMILY MEDICINE CLINIC | Facility: TELEHEALTH | Age: 74
End: 2024-09-08
Payer: MEDICARE

## 2024-09-08 NOTE — E-VISIT TREATED
Date: 2024 09:58:40  Clinician: Elvia Arce  Clinician NPI: [id number]  Patient: Keisha Malagon  Patient : 1950  Patient Address: [address] [place] [address]  Patient Phone: [phone]  Visit Protocol: URI  Patient Summary:  Keisha is a 73 year old ( : 1950 ) female who initiated a visit for cold, sinus infection, or influenza.     Keisha states the symptoms started gradually 7-9 days ago.   Symptom start date:    The symptoms consist of a   sore throat, facial pain or pressure, a cough, malaise, nasal congestion, wheezing, myalgia, rhinitis, enlarged lymph nodes, ear pain, and a headache. Keisha is experiencing mild difficulty breathing with daily activities but can speak normally in full   sentences.   Symptom details     Nasal secretions: The color of the mucus is yellow.    Cough: Keisha coughs a few times an hour and the cough is more bothersome at night. Phlegm comes into the throat when coughing. Keisha does not believe the cough is caused by post-nasal drip. The color of the phlegm is yellow.     Sore throat: Keisha reports having mild throat pain (1-3 on a 10 point pain scale), does not have exudate on the tonsils, and can swallow liquids without any difficulty. The lymph nodes in the neck are enlarged. The lymph node swelling is not worse on   one side and the swelling has not caused changes in speech or hoarseness in the voice. A rash has not appeared on the skin since the sore throat started.     Wheezing: Wheezing impacts daily activities sometimes.     Facial pain or pressure: The facial pain or pressure feels worse when bending over or leaning forward.     Headache: The headache is moderate (4-6 on a 10 point pain scale).      Keisha denies having vomiting, diarrhea, anosmia, nausea, teeth pain, chills, fever, and ageusia. Keisha also denies having recent facial or sinus surgery in the past 60 days, taking antibiotic medication in the past month, and double  sickening   (worsening symptoms after initial improvement).   Precipitating events  Keisha is not sure if they have been exposed to someone with strep throat. Keisha has recently been exposed to someone with influenza. Keisha has been in close contact with the   following high risk individuals: children under the age of 5.   Keisha has not received the RSV vaccine.   Keisha has not received the influenza vaccination.   Pertinent COVID-19 (Coronavirus) information  Since the symptoms started, Keisha has tested for   COVID-19. The result of the test was negative.   Keisha has not had COVID-19 in the last 3 months.   Keisha has received a COVID-19 vaccine in the past year.     Pertinent medical history    Keisha reports having the following conditions:   Hypertension      Keisha had 3 sinus infections within the past year.   A provider has told Keisha to avoid NSAIDs.   Keisha does not get yeast infections when an antibiotic is taken.   Keisha does not have diabetes. Keisha denies having immunosuppressive conditions (e.g.,   chemotherapy, HIV, organ transplant, long-term use of steroids or other immunosuppressive medications, splenectomy). Keisha denies having congestive heart failure, heart disease, and severe COPD. Keisha does not have asthma.   Keisha does not smoke or use   smokeless tobacco. Keisha does not vape or use other e-cigarette products.   Additional information as reported by the patient (free text): When I get this bad cough and congestion Augmentin works very well for me   Weight: 235 lbs (106.59 kg)    MEDICATIONS: baclofen oral, Dexilant oral, fluticasone propionate nasal, acetaminophen oral, baclofen oral, amoxicillin-potassium clavulanate oral, prednisone oral, estradiol vaginal, doxycycline monohydrate oral, prednisone oral, estradiol vaginal,   hydrochlorothiazide oral, tramadol oral, mupirocin topical, loratadine oral, Mucinex oral, amoxicillin-potassium clavulanate oral, ALLERGIES: levofloxacin, trimethoprim,  sulfamethoxazole, sulfamethoxazole-trimethoprim  Clinician Response:  Dear Keisha,  Based on the information provided, you have acute bacterial sinusitis, also known as a sinus infection. Sinus infections are caused by bacteria or a virus and symptoms are almost always identical. The difference between   the 2 types of infections is timing.  Sinus infections start as viral infections and symptoms improve on their own in about 7 days. A bacterial infection may have developed if any of the following apply to you:     You have had 7 days of symptoms and are experiencing at least 2 of the following:       Fever    Facial pressure or headache    Green or yellow nasal mucus    Symptoms that improved and then got worse again       You have had symptoms for 10 or more days     Medication information  I am prescribing:     Amoxicillin-pot clavulanate 875-125 mg oral tablet. Take 1 tablet by mouth every 12 hours for 10 days. There are no refills with this prescription.   Yeast infections can be a common side effect of antibiotics. The most common symptom of a yeast   infection is itchiness in and around the vagina. Other signs and symptoms include burning, redness of the vulva (the outer part of the female genitals), or a thick, white vaginal discharge that looks like cottage cheese and does not have a bad smell.    Self care  Steps you can take to be as comfortable as possible:     Rest.    Drink plenty of fluids.    Take a warm shower to loosen congestion    Use a cool-mist humidifier.    Use throat lozenges.    Suck on frozen items such as popsicles.    Drink hot tea with lemon and honey.    Gargle with warm salt water (1/4 teaspoon of salt per 8 ounce glass of water).    Take a spoonful of honey to reduce your cough.     When to seek care  Please be seen in a clinic or urgent care if any of the following occur:     New symptoms develop, or symptoms become worse    Symptoms do not start to improve after 3 days of treatment      Call 911 or go to the emergency room if any of the following occur:     Difficulty breathing    If you feel that your throat is closing off    Suddenly develop a rash    Unable to swallow fluids or are drooling     It is possible to have an allergic reaction to an antibiotic even if you have not had one in the past. If you notice a new rash, significant swelling, or difficulty breathing, stop taking this medication immediately and go to a clinic or urgent care.    For the latest updates on COVID-19 (Coronavirus), please visit the Centers for Disease Control and Prevention (CDC). Also, your state and local health department websites may provide additional guidance regarding testing and isolation recommendations for   your location.   Diagnosis: Acute bacterial sinusitis  Diagnosis ICD: J01.90    Follow up instructions: ATTENTION: If you have been prescribed medications, your prescriptions will not be sent until you choose your pharmacy.  To do so open the link within your notification, or go to [facility] and click eVisit in the menu to open your   treatment plan. From there, you can select your pharmacy at the bottom of your after visit summary. You can also go to [email or web address]  Prescriptions  Prescription: amoxicillin-pot clavulanate 875-125 mg oral tablet, take 1 tablet by mouth every 12 hours for 10 days  Sent To: [facility] [id number] - [phone] - [address] [place] [id number]

## 2024-12-17 NOTE — TELEPHONE ENCOUNTER
Discharge Instructions from GI provided to pt      Gastroenterology Follow-Up  Please take pantoprazole 40 mg tablet twice daily, 30 min prior to breakfast and again 30 min prior to dinner.    The prescription was sent to your pharmacy. If the prescription is not covered by your pharmacy, please purchase the over the counter option(s), such as Prilosec OTC or Nexium OTC and take twice daily as instructed above, and call my office so we can start prior-authorization process.   Take sucralfate 1000 mg tablet four times daily.  Stop smoking.  Avoid NSAIDs.  Follow-up in office ([facility]) in 1 month.  Repeat EGD in 8 weeks.   Does she have a cough?  Please try [facility] on [address]

## 2025-02-06 ENCOUNTER — E-VISIT (OUTPATIENT)
Dept: FAMILY MEDICINE CLINIC | Facility: TELEHEALTH | Age: 75
End: 2025-02-06
Payer: MEDICARE

## 2025-02-06 RX ORDER — PREDNISONE 20 MG/1
20 TABLET ORAL 2 TIMES DAILY
Start: 2025-02-06 | End: 2025-02-11

## 2025-02-06 RX ORDER — FLUTICASONE PROPIONATE 50 MCG
2 SPRAY, SUSPENSION (ML) NASAL DAILY
Start: 2025-02-06

## 2025-02-06 NOTE — E-VISIT TREATED
Date: 2025 09:22:37  Clinician: Berta Fulton  Clinician NPI: [id number]  Patient: Keisha Malagon  Patient : 1950  Patient Address: [address] [place] [address]  Patient Phone: [phone]  Visit Protocol: URI  Patient Summary:  Keisha is a 74 year old ( : 1950 ) female who initiated a visit for cold, sinus infection, or influenza.     Keisha states the symptoms started suddenly 3-5 days ago. After the symptoms started, they improved and then got worse   again.   Symptom start date: 2025   The symptoms consist of a headache, ear pain, rhinitis, enlarged lymph nodes, facial pain or pressure, malaise, nasal congestion, and a sore throat. Keisha is experiencing difficulty breathing due to nasal   congestion but is not short of breath.   Symptom details     Nasal secretions: The color of the mucus is yellow.    Sore throat: Keisha reports having moderate throat pain (4-6 on a 10 point pain scale), does not have exudate on the tonsils, and can swallow liquids without any difficulty. The lymph nodes in the neck are enlarged. The lymph node swelling is not worse   on one side and the swelling has not caused changes in speech or hoarseness in the voice. A rash has not appeared on the skin since the sore throat started. Patient reports feeling pain in the front of the neck that sometimes moves to the ear.     Facial pain or pressure: The facial pain or pressure feels worse when bending over or leaning forward.     Headache: The headache is moderate (4-6 on a 10 point pain scale).      Keisha denies having wheezing, vomiting, anosmia and ageusia, nausea, chills, diarrhea, cough, teeth pain, fever, and myalgias. Keisha also denies experiencing difficulty opening their mouth due to pain or swelling in the jaw muscles, taking antibiotic   medication in the past month, and having recent facial or sinus surgery in the past 60 days.   Precipitating events  Keisha is not sure if they have been  exposed to someone with strep throat.   Keisha has received the RSV vaccine.   Pertinent COVID-19   (Coronavirus) information  Since the symptoms started, Keisha has not tested for COVID-19. Keisha was able to test during the visit. The result is negative.   Keisha has received a COVID-19 vaccine in the past year.     Pertinent medical history    Keisha   reports having the following conditions:   Hypertension    Keisha had 1 sinus infection within the past year.   Keisha typically gets a yeast infection when an antibiotic is taken. Keisha has successfully used non-prescription therapy to treat previous   yeast infections.   A provider has told Keisha to avoid NSAIDs.   Keisha does not have diabetes. Keisha denies having immunosuppressive conditions (e.g., chemotherapy, HIV, organ transplant, long-term use of steroids or other immunosuppressive medications,   splenectomy). Keisha denies having congestive heart failure, heart disease, and severe COPD. Keisha does not have asthma.   Keisha does not smoke or use smokeless tobacco. Keisha does not vape or use other e-cigarette products.   Additional information as   reported by the patient (free text): Augmentin works for me   Weight: 235 lbs (106.59 kg)    MEDICATIONS: amoxicillin-potassium clavulanate oral, baclofen oral, Dexilant oral, acetaminophen oral, estradiol vaginal, tramadol oral, mupirocin topical, ALLERGIES: sulfamethoxazole-trimethoprim, levofloxacin, trimethoprim, sulfamethoxazole  Clinician Response:  Dear Keisha,  Based on the information provided, you have viral sinusitis, also known as a sinus infection. Most cases of sinus infections are caused by viruses and symptoms start to improve on their own in 7-10 days. Both viral and   bacterial sinus infections usually resolve on its own. A bacterial infection may have developed if any of the following apply to you.      Symptoms of sinus infection lasting 10 days or more without showing any improvement    If you feel  better after a viral upper respiratory infection such as, a cold but then suddenly feel worse with symptoms such as fever, headache, or increase in nasal discharge     Medication information  Because you have a viral infection, antibiotics will not help you get better. Treating a viral infection with antibiotics could actually make you feel worse.  I am prescribing:     Fluticasone 50 mcg/actuation nasal spray. Spray twice in each nostril 1 time per day; after 1 week, may adjust to 1 - 2 sprays in each nostril 1 time per day. This medication takes several days to start working, so keep taking it even if it doesn't help   right away. There are no refills with this prescription.   Tips for using a nasal spray:     When the medication is being sprayed in your nose, point the tip of the nasal spray towards your ear.    Do not blow your nose after using the spray.    Do not lean your head back after using the spray as it will go down your throat.    Wipe the tip of the nose piece after use with a dry, clean tissue.     Self care  Steps you can take to be as comfortable as possible:     Rest.    Drink plenty of fluids.    Take a warm shower to loosen congestion.    Use a cool-mist humidifier.    Use throat lozenges.    Suck on frozen items such as popsicles.    Drink hot tea with lemon and honey.    Gargle with warm salt water (1/4 teaspoon of salt per 8 ounce glass of water).     When to seek care  Please be seen in a clinic or urgent care if any of the following occur:   New symptoms develop, or symptoms become worse   Call 911 or go to the emergency room if any of the following occur:     Difficulty breathing    If you feel that your throat is closing off    Suddenly develop a rash    Unable to swallow fluids or are drooling     For the latest updates on COVID-19 (Coronavirus), please visit the Centers for Disease Control and Prevention (CDC). Also, your state and local health department websites may provide additional  guidance regarding testing and isolation recommendations   for your location.   Diagnosis: Viral sinusitis  Diagnosis ICD: J01.90    Follow up instructions: ATTENTION: If you have been prescribed medications, your prescriptions will not be sent until you choose your pharmacy.  To do so open the link within your notification, or go to [employer] and click eVisit in the menu to open your   treatment plan. From there, you can select your pharmacy at the bottom of your after visit summary. You can also go to [email or web address]  Prescriptions  Prescription: prednisone 20 mg oral tablet, take 1 tablet by mouth 2 times per day for 5 days  Sent To: [facility] [id number] - [phone] - [address] [place] [address]  Prescription: fluticasone 50 mcg/actuation nasal spray,suspension, spray twice in each nostril 1 time per day; after 1 week, may adjust to 1 - 2 sprays in each nostril 1 time per day.  Sent To: [facility] [id number] - [id number] - [address] [place] [address]

## 2025-03-02 ENCOUNTER — E-VISIT (OUTPATIENT)
Dept: ADMINISTRATIVE | Facility: OTHER | Age: 75
End: 2025-03-02
Payer: MEDICARE

## 2025-03-02 ENCOUNTER — E-VISIT (OUTPATIENT)
Dept: FAMILY MEDICINE CLINIC | Facility: TELEHEALTH | Age: 75
End: 2025-03-02
Payer: MEDICARE

## 2025-03-02 PROCEDURE — FABRICHEALTHVISIT: Performed by: NURSE PRACTITIONER

## 2025-03-02 NOTE — E-VISIT ESCALATED
Status: Referred Out  Date: 2025 09:36:41  Acuity Level: Not applicable  Referral message: Based on the information you provided during your interview, eVisit is not appropriate for treating your condition.  Patient: Keisha Malagon  Patient : 1950  Patient Address: [address] [place] [address]  Patient Phone: [phone]  Clinician Response: Unavailable  Diagnosis: Unavailable  Diagnosis ICD: Unavailable     Patient Interview Questions and Responses: None available

## 2025-03-02 NOTE — E-VISIT TREATED
Date: 2025 10:52:30  Clinician: Kiarra Angulo  Clinician NPI: [id number]  Patient: Keisha Malagon  Patient : 1950  Patient Address: [address] [place] [address]  Patient Phone: [phone]  Visit Protocol: URI  Patient Summary:  Keisha is a 74 year old ( : 1950 ) female who initiated a visit for cold, sinus infection, or influenza.     Keisha states the symptoms started gradually 7-9 days ago.   Symptom start date: 25   The symptoms consist of   facial pain or pressure, tooth pain, nasal congestion, ear pain, rhinitis, and a headache.   Symptom details     Nasal secretions: The color of the mucus is blood-tinged and green.    Facial pain or pressure: The facial pain or pressure feels worse when bending over or leaning forward.     Headache: The headache is moderate (4-6 on a 10 point pain scale).     Tooth pain: The tooth pain is not caused by a cavity, recent dental work, or other mouth problems.      Keisha denies having malaise, chills, diarrhea, myalgias, nausea, vomiting, fever, sore throat, anosmia and ageusia, wheezing, and cough. Keisha also denies having recent facial or sinus surgery in the past 60 days, taking antibiotic medication in the   past month, and double sickening (worsening symptoms after initial improvement). Keisha is not experiencing dyspnea.    Keisha has received the RSV vaccine.   Pertinent COVID-19 (Coronavirus) information  Since the symptoms started, Keisha has tested for   COVID-19. The result of the test was negative. The negative result was within the last 48 hours.   Keisha has received a COVID-19 vaccine in the past year.     Pertinent medical history    Keisha reports having the following conditions:   Hypertension      Keisha had 1 sinus infection within the past year.   A provider has told Keisha to avoid NSAIDs.   Keisha does not get yeast infections when an antibiotic is taken.   Keisha does not have diabetes. Keisha denies having  immunosuppressive conditions (e.g.,   chemotherapy, HIV, organ transplant, long-term use of steroids or other immunosuppressive medications, splenectomy). Keisha denies having severe COPD, heart disease, and congestive heart failure. Keisha does not have asthma.   Keisha does not smoke or use   smokeless tobacco. Keisha does not vape or use other e-cigarette products.   Additional information as reported by the patient (free text): I have used Augmentin for sinusitis and it works well   Weight: 235 lbs (106.59 kg)    MEDICATIONS: fluticasone propionate nasal, amoxicillin-potassium clavulanate oral, baclofen oral, Dexilant oral, acetaminophen oral, estradiol vaginal, tramadol oral, mupirocin topical, ALLERGIES: sulfamethoxazole-trimethoprim, levofloxacin,   trimethoprim, sulfamethoxazole  Clinician Response:  Dear Keisha,  Based on the information provided, you have acute bacterial sinusitis, also known as a sinus infection. Most cases of sinus infections are caused by viruses and symptoms start to improve on their own in 7-10 days. Both   viral and bacterial sinus infections usually resolve on its own. A bacterial infection may have developed if any of the following apply to you.      Symptoms of sinus infection lasting 10 days or more without showing any improvement    If you feel better after a viral upper respiratory infection such as, a cold but then suddenly feel worse with symptoms such as fever, headache, or increase in nasal discharge     Medication information  I am prescribing:     Amoxicillin-pot clavulanate 875-125 mg oral tablet. Take 1 tablet by mouth every 12 hours for 7 days. There are no refills with this prescription.   Yeast infections can be a common side effect of antibiotics. The most common symptom of a yeast infection   is itchiness in and around the vagina. Other signs and symptoms include burning, redness of the vulva (the outer part of the female genitals), or a thick, white vaginal discharge  that looks like cottage cheese and does not have a bad smell.  Unless you   are allergic to the over-the-counter medication(s) below, I recommend using:   A sinus irrigation kit such as Sinus Rinse, Neti Pot, SinuCleanse, or store brand. Be sure to use sterile or previously boiled water to prevent unwanted infections.     Over-the-counter medications do not require a prescription. Ask the pharmacist if you have any questions.  Self care  Steps you can take to be as comfortable as possible:     Rest.    Drink plenty of fluids.    Take a warm shower to loosen congestion.    Use a cool-mist humidifier.     When to seek care  Please be seen in a clinic or urgent care if any of the following occur:     New symptoms develop, or symptoms become worse    Symptoms do not start to improve after 3 days of treatment     Call 911 or go to the emergency room if any of the following occur:     If you feel that your throat is closing off    Suddenly develop a rash    Unable to swallow fluids or are drooling     It is possible to have an allergic reaction to an antibiotic even if you have not had one in the past. If you notice a new rash, significant swelling, or difficulty breathing, stop taking this medication immediately and go to a clinic or urgent care.    For the latest updates on COVID-19 (Coronavirus), please visit the Centers for Disease Control and Prevention (CDC). Also, your state and local health department websites may provide additional guidance regarding testing and isolation recommendations for   your location.   Diagnosis: Acute bacterial sinusitis  Diagnosis ICD: J01.90    Follow up instructions: ATTENTION: If you have been prescribed medications, your prescriptions will not be sent until you choose your pharmacy.  To do so open the link within your notification, or go to [employer] and click eVisit in the menu to open your   treatment plan. From there, you can select your pharmacy at the bottom of your after visit  summary. You can also go to [email or web address]  Prescriptions  Prescription: amoxicillin-pot clavulanate 875-125 mg oral tablet, take 1 tablet by mouth every 12 hours for 7 days  Sent To: [facility] [id number] - [phone] [place] [address] [place] [id number]

## 2025-05-02 NOTE — TELEPHONE ENCOUNTER
Called Pt to let her know that script was sent to pharm. PT voiced understanding.   Patient advised Dr victoria is out of the office until 5/6 and I will forward message to her.

## 2025-08-22 ENCOUNTER — LAB (OUTPATIENT)
Dept: LAB | Facility: HOSPITAL | Age: 75
End: 2025-08-22
Payer: MEDICARE

## 2025-08-22 ENCOUNTER — TRANSCRIBE ORDERS (OUTPATIENT)
Dept: LAB | Facility: HOSPITAL | Age: 75
End: 2025-08-22
Payer: MEDICARE

## 2025-08-22 DIAGNOSIS — L65.9 BALDNESS: Primary | ICD-10-CM
